# Patient Record
Sex: MALE | Race: WHITE | NOT HISPANIC OR LATINO
[De-identification: names, ages, dates, MRNs, and addresses within clinical notes are randomized per-mention and may not be internally consistent; named-entity substitution may affect disease eponyms.]

---

## 2017-01-09 ENCOUNTER — APPOINTMENT (OUTPATIENT)
Dept: CARDIOLOGY | Facility: CLINIC | Age: 57
End: 2017-01-09

## 2017-01-11 ENCOUNTER — APPOINTMENT (OUTPATIENT)
Dept: CARDIOLOGY | Facility: CLINIC | Age: 57
End: 2017-01-11

## 2017-01-17 ENCOUNTER — APPOINTMENT (OUTPATIENT)
Dept: CARDIOLOGY | Facility: CLINIC | Age: 57
End: 2017-01-17

## 2017-02-16 ENCOUNTER — OTHER (OUTPATIENT)
Age: 57
End: 2017-02-16

## 2017-02-27 ENCOUNTER — APPOINTMENT (OUTPATIENT)
Dept: CARDIOLOGY | Facility: CLINIC | Age: 57
End: 2017-02-27

## 2017-04-12 ENCOUNTER — APPOINTMENT (OUTPATIENT)
Dept: CARDIOLOGY | Facility: CLINIC | Age: 57
End: 2017-04-12

## 2017-04-12 VITALS
HEART RATE: 57 BPM | HEIGHT: 70 IN | SYSTOLIC BLOOD PRESSURE: 132 MMHG | WEIGHT: 275 LBS | DIASTOLIC BLOOD PRESSURE: 80 MMHG | BODY MASS INDEX: 39.37 KG/M2

## 2017-06-05 ENCOUNTER — INPATIENT (INPATIENT)
Facility: HOSPITAL | Age: 57
LOS: 0 days | Discharge: HOME | End: 2017-06-06
Attending: EMERGENCY MEDICINE | Admitting: FAMILY MEDICINE

## 2017-06-05 DIAGNOSIS — I25.10 ATHEROSCLEROTIC HEART DISEASE OF NATIVE CORONARY ARTERY WITHOUT ANGINA PECTORIS: ICD-10-CM

## 2017-06-05 DIAGNOSIS — E11.9 TYPE 2 DIABETES MELLITUS WITHOUT COMPLICATIONS: ICD-10-CM

## 2017-06-05 DIAGNOSIS — J45.909 UNSPECIFIED ASTHMA, UNCOMPLICATED: ICD-10-CM

## 2017-06-05 DIAGNOSIS — K20.9 ESOPHAGITIS, UNSPECIFIED: ICD-10-CM

## 2017-06-05 DIAGNOSIS — I16.0 HYPERTENSIVE URGENCY: ICD-10-CM

## 2017-06-05 DIAGNOSIS — E66.9 OBESITY, UNSPECIFIED: ICD-10-CM

## 2017-06-06 ENCOUNTER — APPOINTMENT (OUTPATIENT)
Dept: CARDIOLOGY | Facility: CLINIC | Age: 57
End: 2017-06-06

## 2017-06-27 ENCOUNTER — APPOINTMENT (OUTPATIENT)
Dept: CARDIOLOGY | Facility: CLINIC | Age: 57
End: 2017-06-27

## 2017-06-27 VITALS
WEIGHT: 274 LBS | BODY MASS INDEX: 39.22 KG/M2 | HEART RATE: 79 BPM | SYSTOLIC BLOOD PRESSURE: 152 MMHG | DIASTOLIC BLOOD PRESSURE: 90 MMHG | HEIGHT: 70 IN

## 2017-06-28 DIAGNOSIS — R11.0 NAUSEA: ICD-10-CM

## 2017-06-28 DIAGNOSIS — I10 ESSENTIAL (PRIMARY) HYPERTENSION: ICD-10-CM

## 2017-06-28 DIAGNOSIS — Z98.84 BARIATRIC SURGERY STATUS: ICD-10-CM

## 2017-06-28 DIAGNOSIS — M32.9 SYSTEMIC LUPUS ERYTHEMATOSUS, UNSPECIFIED: ICD-10-CM

## 2017-06-28 DIAGNOSIS — M35.00 SJOGREN SYNDROME, UNSPECIFIED: ICD-10-CM

## 2017-06-28 DIAGNOSIS — E11.9 TYPE 2 DIABETES MELLITUS WITHOUT COMPLICATIONS: ICD-10-CM

## 2017-06-28 DIAGNOSIS — I25.10 ATHEROSCLEROTIC HEART DISEASE OF NATIVE CORONARY ARTERY WITHOUT ANGINA PECTORIS: ICD-10-CM

## 2017-06-28 DIAGNOSIS — R10.11 RIGHT UPPER QUADRANT PAIN: ICD-10-CM

## 2017-06-28 DIAGNOSIS — E78.5 HYPERLIPIDEMIA, UNSPECIFIED: ICD-10-CM

## 2017-06-28 DIAGNOSIS — J44.9 CHRONIC OBSTRUCTIVE PULMONARY DISEASE, UNSPECIFIED: ICD-10-CM

## 2017-06-28 DIAGNOSIS — R07.89 OTHER CHEST PAIN: ICD-10-CM

## 2017-06-28 DIAGNOSIS — Z95.1 PRESENCE OF AORTOCORONARY BYPASS GRAFT: ICD-10-CM

## 2017-06-28 DIAGNOSIS — R07.9 CHEST PAIN, UNSPECIFIED: ICD-10-CM

## 2017-06-28 DIAGNOSIS — M06.9 RHEUMATOID ARTHRITIS, UNSPECIFIED: ICD-10-CM

## 2017-06-28 DIAGNOSIS — K22.70 BARRETT'S ESOPHAGUS WITHOUT DYSPLASIA: ICD-10-CM

## 2017-08-09 ENCOUNTER — OUTPATIENT (OUTPATIENT)
Dept: OUTPATIENT SERVICES | Facility: HOSPITAL | Age: 57
LOS: 1 days | Discharge: HOME | End: 2017-08-09

## 2017-08-09 DIAGNOSIS — I16.0 HYPERTENSIVE URGENCY: ICD-10-CM

## 2017-08-09 DIAGNOSIS — J45.909 UNSPECIFIED ASTHMA, UNCOMPLICATED: ICD-10-CM

## 2017-08-09 DIAGNOSIS — I25.10 ATHEROSCLEROTIC HEART DISEASE OF NATIVE CORONARY ARTERY WITHOUT ANGINA PECTORIS: ICD-10-CM

## 2017-08-09 DIAGNOSIS — E11.9 TYPE 2 DIABETES MELLITUS WITHOUT COMPLICATIONS: ICD-10-CM

## 2017-08-09 DIAGNOSIS — K20.9 ESOPHAGITIS, UNSPECIFIED: ICD-10-CM

## 2017-08-09 DIAGNOSIS — E66.9 OBESITY, UNSPECIFIED: ICD-10-CM

## 2017-08-14 DIAGNOSIS — K62.5 HEMORRHAGE OF ANUS AND RECTUM: ICD-10-CM

## 2017-08-14 DIAGNOSIS — I10 ESSENTIAL (PRIMARY) HYPERTENSION: ICD-10-CM

## 2017-08-14 DIAGNOSIS — D12.4 BENIGN NEOPLASM OF DESCENDING COLON: ICD-10-CM

## 2017-08-14 DIAGNOSIS — J44.9 CHRONIC OBSTRUCTIVE PULMONARY DISEASE, UNSPECIFIED: ICD-10-CM

## 2017-08-14 DIAGNOSIS — K57.30 DIVERTICULOSIS OF LARGE INTESTINE WITHOUT PERFORATION OR ABSCESS WITHOUT BLEEDING: ICD-10-CM

## 2017-08-14 DIAGNOSIS — G47.33 OBSTRUCTIVE SLEEP APNEA (ADULT) (PEDIATRIC): ICD-10-CM

## 2017-08-14 DIAGNOSIS — K64.8 OTHER HEMORRHOIDS: ICD-10-CM

## 2017-08-14 DIAGNOSIS — K52.9 NONINFECTIVE GASTROENTERITIS AND COLITIS, UNSPECIFIED: ICD-10-CM

## 2017-08-14 DIAGNOSIS — I25.10 ATHEROSCLEROTIC HEART DISEASE OF NATIVE CORONARY ARTERY WITHOUT ANGINA PECTORIS: ICD-10-CM

## 2017-08-14 DIAGNOSIS — E11.9 TYPE 2 DIABETES MELLITUS WITHOUT COMPLICATIONS: ICD-10-CM

## 2017-08-18 ENCOUNTER — MEDICATION RENEWAL (OUTPATIENT)
Age: 57
End: 2017-08-18

## 2017-08-24 ENCOUNTER — MEDICATION RENEWAL (OUTPATIENT)
Age: 57
End: 2017-08-24

## 2017-09-06 ENCOUNTER — APPOINTMENT (OUTPATIENT)
Dept: CARDIOLOGY | Facility: CLINIC | Age: 57
End: 2017-09-06

## 2017-12-06 ENCOUNTER — APPOINTMENT (OUTPATIENT)
Dept: CARDIOLOGY | Facility: CLINIC | Age: 57
End: 2017-12-06

## 2017-12-06 VITALS
HEIGHT: 70 IN | WEIGHT: 285 LBS | DIASTOLIC BLOOD PRESSURE: 106 MMHG | HEART RATE: 87 BPM | BODY MASS INDEX: 40.8 KG/M2 | SYSTOLIC BLOOD PRESSURE: 218 MMHG

## 2017-12-06 RX ORDER — ATORVASTATIN CALCIUM 40 MG/1
40 TABLET, FILM COATED ORAL DAILY
Qty: 90 | Refills: 3 | Status: ACTIVE | COMMUNITY

## 2017-12-06 RX ORDER — OXYCODONE AND ACETAMINOPHEN 5; 325 MG/1; MG/1
5-325 TABLET ORAL
Refills: 0 | Status: ACTIVE | COMMUNITY

## 2017-12-06 RX ORDER — PREDNISONE 20 MG/1
20 TABLET ORAL
Refills: 0 | Status: ACTIVE | COMMUNITY

## 2017-12-06 RX ORDER — RANITIDINE HYDROCHLORIDE 150 MG/1
150 CAPSULE ORAL
Refills: 0 | Status: ACTIVE | COMMUNITY
Start: 2017-03-31

## 2017-12-13 ENCOUNTER — APPOINTMENT (OUTPATIENT)
Dept: CARDIOLOGY | Facility: CLINIC | Age: 57
End: 2017-12-13

## 2017-12-13 VITALS
SYSTOLIC BLOOD PRESSURE: 178 MMHG | HEIGHT: 70 IN | BODY MASS INDEX: 40.8 KG/M2 | WEIGHT: 285 LBS | DIASTOLIC BLOOD PRESSURE: 100 MMHG

## 2017-12-13 RX ORDER — DIPHENHYDRAMINE HYDROCHLORIDE 25 MG/1
25 CAPSULE ORAL
Refills: 0 | Status: ACTIVE | COMMUNITY
Start: 2017-06-16

## 2018-01-01 ENCOUNTER — MEDICATION RENEWAL (OUTPATIENT)
Age: 58
End: 2018-01-01

## 2018-01-01 ENCOUNTER — RX RENEWAL (OUTPATIENT)
Age: 58
End: 2018-01-01

## 2018-01-03 ENCOUNTER — APPOINTMENT (OUTPATIENT)
Dept: CARDIOLOGY | Facility: CLINIC | Age: 58
End: 2018-01-03

## 2018-01-03 VITALS
WEIGHT: 285 LBS | HEIGHT: 70 IN | DIASTOLIC BLOOD PRESSURE: 100 MMHG | BODY MASS INDEX: 40.8 KG/M2 | SYSTOLIC BLOOD PRESSURE: 178 MMHG

## 2018-01-16 ENCOUNTER — OUTPATIENT (OUTPATIENT)
Dept: OUTPATIENT SERVICES | Facility: HOSPITAL | Age: 58
LOS: 1 days | Discharge: HOME | End: 2018-01-16

## 2018-01-16 DIAGNOSIS — I16.0 HYPERTENSIVE URGENCY: ICD-10-CM

## 2018-01-16 DIAGNOSIS — J45.909 UNSPECIFIED ASTHMA, UNCOMPLICATED: ICD-10-CM

## 2018-01-16 DIAGNOSIS — I25.10 ATHEROSCLEROTIC HEART DISEASE OF NATIVE CORONARY ARTERY WITHOUT ANGINA PECTORIS: ICD-10-CM

## 2018-01-16 DIAGNOSIS — E66.9 OBESITY, UNSPECIFIED: ICD-10-CM

## 2018-01-16 DIAGNOSIS — K20.9 ESOPHAGITIS, UNSPECIFIED: ICD-10-CM

## 2018-01-16 DIAGNOSIS — E11.9 TYPE 2 DIABETES MELLITUS WITHOUT COMPLICATIONS: ICD-10-CM

## 2018-05-09 ENCOUNTER — APPOINTMENT (OUTPATIENT)
Dept: CARDIOLOGY | Facility: CLINIC | Age: 58
End: 2018-05-09

## 2018-05-09 VITALS
SYSTOLIC BLOOD PRESSURE: 180 MMHG | HEIGHT: 70 IN | WEIGHT: 276 LBS | HEART RATE: 71 BPM | DIASTOLIC BLOOD PRESSURE: 80 MMHG | BODY MASS INDEX: 39.51 KG/M2

## 2018-05-15 ENCOUNTER — OUTPATIENT (OUTPATIENT)
Dept: OUTPATIENT SERVICES | Facility: HOSPITAL | Age: 58
LOS: 1 days | Discharge: HOME | End: 2018-05-15

## 2018-05-15 VITALS
TEMPERATURE: 97 F | HEART RATE: 68 BPM | DIASTOLIC BLOOD PRESSURE: 88 MMHG | SYSTOLIC BLOOD PRESSURE: 171 MMHG | RESPIRATION RATE: 18 BRPM | HEIGHT: 71 IN | OXYGEN SATURATION: 98 % | WEIGHT: 262.35 LBS

## 2018-05-15 DIAGNOSIS — I10 ESSENTIAL (PRIMARY) HYPERTENSION: ICD-10-CM

## 2018-05-15 DIAGNOSIS — G47.30 SLEEP APNEA, UNSPECIFIED: ICD-10-CM

## 2018-05-15 DIAGNOSIS — E11.9 TYPE 2 DIABETES MELLITUS WITHOUT COMPLICATIONS: ICD-10-CM

## 2018-05-15 DIAGNOSIS — M32.9 SYSTEMIC LUPUS ERYTHEMATOSUS, UNSPECIFIED: ICD-10-CM

## 2018-05-15 DIAGNOSIS — Z98.890 OTHER SPECIFIED POSTPROCEDURAL STATES: Chronic | ICD-10-CM

## 2018-05-15 DIAGNOSIS — Z01.818 ENCOUNTER FOR OTHER PREPROCEDURAL EXAMINATION: ICD-10-CM

## 2018-05-15 DIAGNOSIS — E66.9 OBESITY, UNSPECIFIED: ICD-10-CM

## 2018-05-15 DIAGNOSIS — I25.10 ATHEROSCLEROTIC HEART DISEASE OF NATIVE CORONARY ARTERY WITHOUT ANGINA PECTORIS: ICD-10-CM

## 2018-05-15 LAB
ANION GAP SERPL CALC-SCNC: 14 MMOL/L — SIGNIFICANT CHANGE UP (ref 7–14)
APTT BLD: 30.6 SEC — SIGNIFICANT CHANGE UP (ref 27–39.2)
BASOPHILS # BLD AUTO: 0.07 K/UL — SIGNIFICANT CHANGE UP (ref 0–0.2)
BASOPHILS NFR BLD AUTO: 0.9 % — SIGNIFICANT CHANGE UP (ref 0–1)
BUN SERPL-MCNC: 20 MG/DL — SIGNIFICANT CHANGE UP (ref 10–20)
CALCIUM SERPL-MCNC: 9.8 MG/DL — SIGNIFICANT CHANGE UP (ref 8.5–10.1)
CHLORIDE SERPL-SCNC: 97 MMOL/L — LOW (ref 98–110)
CO2 SERPL-SCNC: 28 MMOL/L — SIGNIFICANT CHANGE UP (ref 17–32)
CREAT SERPL-MCNC: 1.1 MG/DL — SIGNIFICANT CHANGE UP (ref 0.7–1.5)
EOSINOPHIL # BLD AUTO: 0.18 K/UL — SIGNIFICANT CHANGE UP (ref 0–0.7)
EOSINOPHIL NFR BLD AUTO: 2.3 % — SIGNIFICANT CHANGE UP (ref 0–8)
GLUCOSE SERPL-MCNC: 178 MG/DL — HIGH (ref 70–99)
HCT VFR BLD CALC: 48.1 % — SIGNIFICANT CHANGE UP (ref 42–52)
HGB BLD-MCNC: 15.8 G/DL — SIGNIFICANT CHANGE UP (ref 14–18)
IMM GRANULOCYTES NFR BLD AUTO: 0.5 % — HIGH (ref 0.1–0.3)
INR BLD: 0.94 RATIO — SIGNIFICANT CHANGE UP (ref 0.65–1.3)
LYMPHOCYTES # BLD AUTO: 1.37 K/UL — SIGNIFICANT CHANGE UP (ref 1.2–3.4)
LYMPHOCYTES # BLD AUTO: 17.3 % — LOW (ref 20.5–51.1)
MCHC RBC-ENTMCNC: 27.5 PG — SIGNIFICANT CHANGE UP (ref 27–31)
MCHC RBC-ENTMCNC: 32.8 G/DL — SIGNIFICANT CHANGE UP (ref 32–37)
MCV RBC AUTO: 83.8 FL — SIGNIFICANT CHANGE UP (ref 80–94)
MONOCYTES # BLD AUTO: 0.58 K/UL — SIGNIFICANT CHANGE UP (ref 0.1–0.6)
MONOCYTES NFR BLD AUTO: 7.3 % — SIGNIFICANT CHANGE UP (ref 1.7–9.3)
NEUTROPHILS # BLD AUTO: 5.68 K/UL — SIGNIFICANT CHANGE UP (ref 1.4–6.5)
NEUTROPHILS NFR BLD AUTO: 71.7 % — SIGNIFICANT CHANGE UP (ref 42.2–75.2)
NRBC # BLD: 0 /100 WBCS — SIGNIFICANT CHANGE UP (ref 0–0)
PLATELET # BLD AUTO: 238 K/UL — SIGNIFICANT CHANGE UP (ref 130–400)
POTASSIUM SERPL-MCNC: 3.6 MMOL/L — SIGNIFICANT CHANGE UP (ref 3.5–5)
POTASSIUM SERPL-SCNC: 3.6 MMOL/L — SIGNIFICANT CHANGE UP (ref 3.5–5)
PROTHROM AB SERPL-ACNC: 10.1 SEC — SIGNIFICANT CHANGE UP (ref 9.95–12.87)
RBC # BLD: 5.74 M/UL — SIGNIFICANT CHANGE UP (ref 4.7–6.1)
RBC # FLD: 12.9 % — SIGNIFICANT CHANGE UP (ref 11.5–14.5)
SODIUM SERPL-SCNC: 139 MMOL/L — SIGNIFICANT CHANGE UP (ref 135–146)
WBC # BLD: 7.92 K/UL — SIGNIFICANT CHANGE UP (ref 4.8–10.8)
WBC # FLD AUTO: 7.92 K/UL — SIGNIFICANT CHANGE UP (ref 4.8–10.8)

## 2018-05-15 RX ORDER — ASPIRIN/CALCIUM CARB/MAGNESIUM 324 MG
1 TABLET ORAL
Qty: 0 | Refills: 0 | COMMUNITY

## 2018-05-15 RX ORDER — NEBIVOLOL HYDROCHLORIDE 5 MG/1
1 TABLET ORAL
Qty: 0 | Refills: 0 | COMMUNITY

## 2018-05-15 RX ORDER — METOPROLOL TARTRATE 50 MG
1 TABLET ORAL
Qty: 0 | Refills: 0 | COMMUNITY

## 2018-05-15 NOTE — H&P PST ADULT - PMH
Arthritis  ra hands knees  Asthma  stable post 911  CAD (coronary artery disease) of bypass graft  2015 x 3 vd  COPD (chronic obstructive pulmonary disease)  post 911  Diabetes    Hiatal hernia with GERD    HTN (hypertension)    Murmur    Myocardial infarction  2015  Obesity    Obstructive sleep apnea on CPAP    Sjogrens syndrome    SLE (systemic lupus erythematosus)    Thyroid nodule
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
## 2018-05-15 NOTE — H&P PST ADULT - PRIMARY CARE PROVIDER
de gennearo  cardio malpeso rheum dr hoover  h/o ra no dis mod rx  x 1 yr last c spine xray 6 mos ago wnl pulm dr anne marie adairny

## 2018-05-15 NOTE — H&P PST ADULT - FAMILY HISTORY
Father  Still living? Unknown  CAD (coronary artery disease), Age at diagnosis: Age Unknown  Diabetes, Age at diagnosis: Age Unknown     Mother  Still living? Unknown  CAD (coronary artery disease), Age at diagnosis: Age Unknown  Diabetes, Age at diagnosis: Age Unknown

## 2018-05-22 ENCOUNTER — OUTPATIENT (OUTPATIENT)
Dept: OUTPATIENT SERVICES | Facility: HOSPITAL | Age: 58
LOS: 1 days | Discharge: HOME | End: 2018-05-22

## 2018-05-22 ENCOUNTER — INPATIENT (INPATIENT)
Facility: HOSPITAL | Age: 58
LOS: 0 days | Discharge: HOME | End: 2018-05-22
Attending: INTERNAL MEDICINE | Admitting: INTERNAL MEDICINE

## 2018-05-22 DIAGNOSIS — I10 ESSENTIAL (PRIMARY) HYPERTENSION: ICD-10-CM

## 2018-05-22 DIAGNOSIS — E66.9 OBESITY, UNSPECIFIED: ICD-10-CM

## 2018-05-22 DIAGNOSIS — E11.9 TYPE 2 DIABETES MELLITUS WITHOUT COMPLICATIONS: ICD-10-CM

## 2018-05-22 DIAGNOSIS — Z98.890 OTHER SPECIFIED POSTPROCEDURAL STATES: Chronic | ICD-10-CM

## 2018-05-22 DIAGNOSIS — G47.33 OBSTRUCTIVE SLEEP APNEA (ADULT) (PEDIATRIC): ICD-10-CM

## 2018-05-22 DIAGNOSIS — I25.10 ATHEROSCLEROTIC HEART DISEASE OF NATIVE CORONARY ARTERY WITHOUT ANGINA PECTORIS: ICD-10-CM

## 2018-05-22 DIAGNOSIS — J44.9 CHRONIC OBSTRUCTIVE PULMONARY DISEASE, UNSPECIFIED: ICD-10-CM

## 2018-05-22 NOTE — PROGRESS NOTE ADULT - SUBJECTIVE AND OBJECTIVE BOX
POST OPERATIVE PROCEDURAL DOCUMENTATION   Preliminary Cardiac Catherization Post-Procedure Report:    PRE-OP DIAGNOSIS: CAD, S/P CABG, Angina, Positive stress thallium exam    PROCEDURE:     Left Heart Catheterization     LV Angiogram     Selective Coronary Angiogram     Opacification of bypass grafts     Peripheral Angiogram    Primary Physician:  Aleksey Raymond M.D.  Cardiac Fellow:  Dr. Rosales    ANESTHESIA TYPE: Local    ESTIMATED BLOOD LOSS:  Less than 10 cc    CONDITION: Good    SPECIMENS REMOVED (IF APPLICABLE): Not Applicable    IMPLANTS (IF APPLICABLE): NA    DESCRIPTION OF PROCEDURRE:  The right and left femoral groins, as well as the right radial artery were prepped and draped in the usual sterile fashion. Following local instillation of lidocane, a 6Fr introducer was inserted percutaneously into the right femoral artery using the seldinger technique. Following this, multiple guidewires and pre shaped catheters were used to adequately opacifiy the coronary arteries and perform left heart catheterization as well as an LV angiogram, using the Judkin's approach. Left heart catheterization and left ventricular angiogram was performed in the GILL projection. Following this, selective coronary angiography was performed in multiple obliquities. At the end of the procedure, all guidewires and catheters were removed. Homostasis was obtained by closure device.  There were no complications.                                 FINDINGS OF PROCEDURE:  1. Left Heart Catheterization:            LVEDP: normal                                                       LV Pressure: 12-14                                                       CA Pressure: 180/12-14                  2. LV Angiogram:  The LV is normal in size.                                    Overall, there is mild LV systolic dysfunction.                                   Mild global hypokenesia noted.                                   The EF is approx. 50%                                   No mitral valve prolapse, mitral insufficiency or mitral annular calcification seen.   3. SELECTIVE CORONARY ANGIOGRAM:                           LEFT MAIN: Severe occlusion                           LAD: 100% mid occlusion                           LCX: 100% occluded                           RCA: Normal                           PDA: Normal                           PLV: 90% obstructed  4. Lima to the LAD was patent     Vein to OM off the LCX and PLV off the RCA was patent and free of disease.    5.  The coronary circulation was right dominant.    5.  PERIPHERAL ANGIOGRAM was performed, which revealed no significant aorta-illiac disease. A closure device using angioseal was done.        Post Procedure Diagnosis/Impression:CAD, Angina, Patent grafts, Mild LV systolic dysfunction, Positive thallium exam.                         Complications:  None    PLAN OF CARE:  D/C Home today                              Continue medical therapy                              Office visit in 2-3 weeks

## 2018-05-22 NOTE — PROGRESS NOTE ADULT - SUBJECTIVE AND OBJECTIVE BOX
PRE-OPERATIVE DAY OF PROCEDURE EVALUATION NOTE:  I have personally seen and examined the patient. I agree with the history and physicial which I have reviewed and noted any changes below. signed electronically by Dr Aleksey Rayomnd 05-22-18 @ 08:39

## 2018-05-27 DIAGNOSIS — I25.119 ATHEROSCLEROTIC HEART DISEASE OF NATIVE CORONARY ARTERY WITH UNSPECIFIED ANGINA PECTORIS: ICD-10-CM

## 2018-05-27 DIAGNOSIS — Z95.5 PRESENCE OF CORONARY ANGIOPLASTY IMPLANT AND GRAFT: ICD-10-CM

## 2018-06-14 ENCOUNTER — APPOINTMENT (OUTPATIENT)
Dept: CARDIOLOGY | Facility: CLINIC | Age: 58
End: 2018-06-14

## 2018-06-14 VITALS
SYSTOLIC BLOOD PRESSURE: 180 MMHG | HEIGHT: 70 IN | DIASTOLIC BLOOD PRESSURE: 64 MMHG | HEART RATE: 71 BPM | WEIGHT: 278 LBS | BODY MASS INDEX: 39.8 KG/M2

## 2018-06-14 RX ORDER — PANTOPRAZOLE 40 MG/1
40 TABLET, DELAYED RELEASE ORAL
Refills: 0 | Status: ACTIVE | COMMUNITY
Start: 2016-04-21

## 2018-06-14 RX ORDER — BUDESONIDE 180 UG/1
180 AEROSOL, POWDER RESPIRATORY (INHALATION) TWICE DAILY
Refills: 0 | Status: ACTIVE | COMMUNITY
Start: 2016-04-21

## 2018-06-14 RX ORDER — SITAGLIPTIN 100 MG/1
100 TABLET, FILM COATED ORAL
Qty: 10 | Refills: 1 | Status: DISCONTINUED | COMMUNITY
Start: 2018-06-14 | End: 2018-06-14

## 2018-06-22 ENCOUNTER — OTHER (OUTPATIENT)
Age: 58
End: 2018-06-22

## 2018-07-12 ENCOUNTER — RX RENEWAL (OUTPATIENT)
Age: 58
End: 2018-07-12

## 2018-07-27 ENCOUNTER — MEDICATION RENEWAL (OUTPATIENT)
Age: 58
End: 2018-07-27

## 2018-08-27 ENCOUNTER — MEDICATION RENEWAL (OUTPATIENT)
Age: 58
End: 2018-08-27

## 2018-08-29 ENCOUNTER — RX RENEWAL (OUTPATIENT)
Age: 58
End: 2018-08-29

## 2018-09-27 PROBLEM — K21.9 GASTRO-ESOPHAGEAL REFLUX DISEASE WITHOUT ESOPHAGITIS: Chronic | Status: ACTIVE | Noted: 2018-05-15

## 2018-09-27 PROBLEM — R01.1 CARDIAC MURMUR, UNSPECIFIED: Chronic | Status: ACTIVE | Noted: 2018-05-15

## 2018-09-27 PROBLEM — G47.33 OBSTRUCTIVE SLEEP APNEA (ADULT) (PEDIATRIC): Chronic | Status: ACTIVE | Noted: 2018-05-15

## 2018-09-27 PROBLEM — I25.810 ATHEROSCLEROSIS OF CORONARY ARTERY BYPASS GRAFT(S) WITHOUT ANGINA PECTORIS: Chronic | Status: ACTIVE | Noted: 2018-05-15

## 2018-09-27 PROBLEM — E11.9 TYPE 2 DIABETES MELLITUS WITHOUT COMPLICATIONS: Chronic | Status: ACTIVE | Noted: 2018-05-15

## 2018-09-27 PROBLEM — E04.1 NONTOXIC SINGLE THYROID NODULE: Chronic | Status: ACTIVE | Noted: 2018-05-15

## 2018-09-27 PROBLEM — I10 ESSENTIAL (PRIMARY) HYPERTENSION: Chronic | Status: ACTIVE | Noted: 2018-05-15

## 2018-09-27 PROBLEM — I21.9 ACUTE MYOCARDIAL INFARCTION, UNSPECIFIED: Chronic | Status: ACTIVE | Noted: 2018-05-15

## 2018-09-27 PROBLEM — M32.9 SYSTEMIC LUPUS ERYTHEMATOSUS, UNSPECIFIED: Chronic | Status: ACTIVE | Noted: 2018-05-15

## 2018-09-27 PROBLEM — J45.909 UNSPECIFIED ASTHMA, UNCOMPLICATED: Chronic | Status: ACTIVE | Noted: 2018-05-15

## 2018-09-27 PROBLEM — J44.9 CHRONIC OBSTRUCTIVE PULMONARY DISEASE, UNSPECIFIED: Chronic | Status: ACTIVE | Noted: 2018-05-15

## 2018-09-27 PROBLEM — M35.00 SJOGREN SYNDROME, UNSPECIFIED: Chronic | Status: ACTIVE | Noted: 2018-05-15

## 2018-09-27 PROBLEM — M19.90 UNSPECIFIED OSTEOARTHRITIS, UNSPECIFIED SITE: Chronic | Status: ACTIVE | Noted: 2018-05-15

## 2018-09-27 PROBLEM — E66.9 OBESITY, UNSPECIFIED: Chronic | Status: ACTIVE | Noted: 2018-05-15

## 2018-11-07 ENCOUNTER — MEDICATION RENEWAL (OUTPATIENT)
Age: 58
End: 2018-11-07

## 2018-11-08 ENCOUNTER — MEDICATION RENEWAL (OUTPATIENT)
Age: 58
End: 2018-11-08

## 2019-01-01 ENCOUNTER — APPOINTMENT (OUTPATIENT)
Dept: NEUROLOGY | Facility: CLINIC | Age: 59
End: 2019-01-01
Payer: MEDICARE

## 2019-01-01 ENCOUNTER — EMERGENCY (EMERGENCY)
Facility: HOSPITAL | Age: 59
LOS: 0 days | Discharge: HOME | End: 2019-12-17
Attending: EMERGENCY MEDICINE
Payer: MEDICARE

## 2019-01-01 ENCOUNTER — RX RENEWAL (OUTPATIENT)
Age: 59
End: 2019-01-01

## 2019-01-01 ENCOUNTER — APPOINTMENT (OUTPATIENT)
Dept: CARDIOLOGY | Facility: CLINIC | Age: 59
End: 2019-01-01

## 2019-01-01 ENCOUNTER — INPATIENT (INPATIENT)
Facility: HOSPITAL | Age: 59
LOS: 1 days | Discharge: HOME | End: 2019-09-23
Attending: HOSPITALIST | Admitting: HOSPITALIST
Payer: MEDICARE

## 2019-01-01 ENCOUNTER — TRANSCRIPTION ENCOUNTER (OUTPATIENT)
Age: 59
End: 2019-01-01

## 2019-01-01 ENCOUNTER — APPOINTMENT (OUTPATIENT)
Dept: CARDIOLOGY | Facility: CLINIC | Age: 59
End: 2019-01-01
Payer: MEDICARE

## 2019-01-01 ENCOUNTER — INPATIENT (INPATIENT)
Facility: HOSPITAL | Age: 59
LOS: 10 days | Discharge: HOME | End: 2019-09-23
Attending: INTERNAL MEDICINE | Admitting: INTERNAL MEDICINE
Payer: MEDICARE

## 2019-01-01 ENCOUNTER — APPOINTMENT (OUTPATIENT)
Dept: NEUROLOGY | Facility: CLINIC | Age: 59
End: 2019-01-01

## 2019-01-01 VITALS — HEIGHT: 70 IN | SYSTOLIC BLOOD PRESSURE: 164 MMHG | HEART RATE: 63 BPM | DIASTOLIC BLOOD PRESSURE: 90 MMHG

## 2019-01-01 VITALS
OXYGEN SATURATION: 97 % | SYSTOLIC BLOOD PRESSURE: 108 MMHG | RESPIRATION RATE: 18 BRPM | TEMPERATURE: 96 F | HEART RATE: 86 BPM | DIASTOLIC BLOOD PRESSURE: 57 MMHG

## 2019-01-01 VITALS
SYSTOLIC BLOOD PRESSURE: 177 MMHG | OXYGEN SATURATION: 97 % | HEART RATE: 82 BPM | TEMPERATURE: 98 F | DIASTOLIC BLOOD PRESSURE: 85 MMHG | RESPIRATION RATE: 18 BRPM

## 2019-01-01 VITALS
HEART RATE: 90 BPM | HEIGHT: 70 IN | DIASTOLIC BLOOD PRESSURE: 100 MMHG | OXYGEN SATURATION: 98 % | WEIGHT: 296 LBS | BODY MASS INDEX: 42.37 KG/M2 | SYSTOLIC BLOOD PRESSURE: 200 MMHG

## 2019-01-01 VITALS
HEART RATE: 65 BPM | OXYGEN SATURATION: 98 % | HEIGHT: 70 IN | DIASTOLIC BLOOD PRESSURE: 88 MMHG | BODY MASS INDEX: 42.37 KG/M2 | WEIGHT: 296 LBS | SYSTOLIC BLOOD PRESSURE: 170 MMHG

## 2019-01-01 VITALS
SYSTOLIC BLOOD PRESSURE: 120 MMHG | TEMPERATURE: 96 F | HEART RATE: 78 BPM | DIASTOLIC BLOOD PRESSURE: 60 MMHG | RESPIRATION RATE: 20 BRPM

## 2019-01-01 VITALS
WEIGHT: 296 LBS | SYSTOLIC BLOOD PRESSURE: 186 MMHG | HEART RATE: 88 BPM | BODY MASS INDEX: 42.37 KG/M2 | DIASTOLIC BLOOD PRESSURE: 82 MMHG | HEIGHT: 70 IN

## 2019-01-01 VITALS — OXYGEN SATURATION: 98 %

## 2019-01-01 VITALS — TEMPERATURE: 97 F | WEIGHT: 250 LBS

## 2019-01-01 DIAGNOSIS — M17.0 BILATERAL PRIMARY OSTEOARTHRITIS OF KNEE: ICD-10-CM

## 2019-01-01 DIAGNOSIS — I25.2 OLD MYOCARDIAL INFARCTION: ICD-10-CM

## 2019-01-01 DIAGNOSIS — R53.1 WEAKNESS: ICD-10-CM
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
## 2019-01-01 DIAGNOSIS — M06.042 RHEUMATOID ARTHRITIS WITHOUT RHEUMATOID FACTOR, LEFT HAND: ICD-10-CM

## 2019-01-01 DIAGNOSIS — Z95.1 PRESENCE OF AORTOCORONARY BYPASS GRAFT: ICD-10-CM

## 2019-01-01 DIAGNOSIS — Z98.890 OTHER SPECIFIED POSTPROCEDURAL STATES: Chronic | ICD-10-CM

## 2019-01-01 DIAGNOSIS — M54.30 SCIATICA, UNSPECIFIED SIDE: ICD-10-CM
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
## 2019-01-01 DIAGNOSIS — E66.01 MORBID (SEVERE) OBESITY DUE TO EXCESS CALORIES: ICD-10-CM

## 2019-01-01 DIAGNOSIS — I10 ESSENTIAL (PRIMARY) HYPERTENSION: ICD-10-CM

## 2019-01-01 DIAGNOSIS — Z86.711 PERSONAL HISTORY OF PULMONARY EMBOLISM: ICD-10-CM

## 2019-01-01 DIAGNOSIS — I87.2 VENOUS INSUFFICIENCY (CHRONIC) (PERIPHERAL): ICD-10-CM

## 2019-01-01 DIAGNOSIS — G47.30 SLEEP APNEA, UNSPECIFIED: ICD-10-CM
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
## 2019-01-01 DIAGNOSIS — R60.9 EDEMA, UNSPECIFIED: ICD-10-CM

## 2019-01-01 DIAGNOSIS — S09.90XA UNSPECIFIED INJURY OF HEAD, INITIAL ENCOUNTER: ICD-10-CM

## 2019-01-01 DIAGNOSIS — I26.09 OTHER PULMONARY EMBOLISM WITH ACUTE COR PULMONALE: ICD-10-CM

## 2019-01-01 DIAGNOSIS — M79.89 OTHER SPECIFIED SOFT TISSUE DISORDERS: ICD-10-CM

## 2019-01-01 DIAGNOSIS — E11.65 TYPE 2 DIABETES MELLITUS WITH HYPERGLYCEMIA: ICD-10-CM

## 2019-01-01 DIAGNOSIS — N28.1 CYST OF KIDNEY, ACQUIRED: ICD-10-CM

## 2019-01-01 DIAGNOSIS — E04.1 NONTOXIC SINGLE THYROID NODULE: ICD-10-CM

## 2019-01-01 DIAGNOSIS — M32.9 SYSTEMIC LUPUS ERYTHEMATOSUS, UNSPECIFIED: ICD-10-CM

## 2019-01-01 DIAGNOSIS — R91.1 SOLITARY PULMONARY NODULE: ICD-10-CM
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
## 2019-01-01 DIAGNOSIS — G47.33 OBSTRUCTIVE SLEEP APNEA (ADULT) (PEDIATRIC): ICD-10-CM

## 2019-01-01 DIAGNOSIS — E87.1 HYPO-OSMOLALITY AND HYPONATREMIA: ICD-10-CM

## 2019-01-01 DIAGNOSIS — I25.10 ATHEROSCLEROTIC HEART DISEASE OF NATIVE CORONARY ARTERY W/OUT ANGINA PECTORIS: ICD-10-CM

## 2019-01-01 DIAGNOSIS — I80.10 PHLEBITIS AND THROMBOPHLEBITIS OF UNSPECIFIED FEMORAL VEIN: ICD-10-CM

## 2019-01-01 DIAGNOSIS — J44.9 CHRONIC OBSTRUCTIVE PULMONARY DISEASE, UNSPECIFIED: ICD-10-CM

## 2019-01-01 DIAGNOSIS — Z91.018 ALLERGY TO OTHER FOODS: ICD-10-CM

## 2019-01-01 DIAGNOSIS — E11.22 TYPE 2 DIABETES MELLITUS WITH DIABETIC CHRONIC KIDNEY DISEASE: ICD-10-CM

## 2019-01-01 DIAGNOSIS — Z79.4 LONG TERM (CURRENT) USE OF INSULIN: ICD-10-CM

## 2019-01-01 DIAGNOSIS — G62.9 POLYNEUROPATHY, UNSPECIFIED: ICD-10-CM

## 2019-01-01 DIAGNOSIS — R01.1 CARDIAC MURMUR, UNSPECIFIED: ICD-10-CM

## 2019-01-01 DIAGNOSIS — E87.6 HYPOKALEMIA: ICD-10-CM

## 2019-01-01 DIAGNOSIS — Z79.01 LONG TERM (CURRENT) USE OF ANTICOAGULANTS: ICD-10-CM

## 2019-01-01 DIAGNOSIS — N18.2 CHRONIC KIDNEY DISEASE, STAGE 2 (MILD): ICD-10-CM

## 2019-01-01 DIAGNOSIS — F41.9 ANXIETY DISORDER, UNSPECIFIED: ICD-10-CM

## 2019-01-01 DIAGNOSIS — I26.99 OTHER PULMONARY EMBOLISM WITHOUT ACUTE COR PULMONALE: ICD-10-CM

## 2019-01-01 DIAGNOSIS — K44.9 DIAPHRAGMATIC HERNIA WITHOUT OBSTRUCTION OR GANGRENE: ICD-10-CM

## 2019-01-01 DIAGNOSIS — Z74.09 OTHER REDUCED MOBILITY: ICD-10-CM

## 2019-01-01 DIAGNOSIS — I46.9 CARDIAC ARREST, CAUSE UNSPECIFIED: ICD-10-CM

## 2019-01-01 DIAGNOSIS — K21.9 GASTRO-ESOPHAGEAL REFLUX DISEASE WITHOUT ESOPHAGITIS: ICD-10-CM

## 2019-01-01 DIAGNOSIS — I25.10 ATHEROSCLEROTIC HEART DISEASE OF NATIVE CORONARY ARTERY WITHOUT ANGINA PECTORIS: ICD-10-CM

## 2019-01-01 DIAGNOSIS — E86.0 DEHYDRATION: ICD-10-CM

## 2019-01-01 DIAGNOSIS — E11.649 TYPE 2 DIABETES MELLITUS WITH HYPOGLYCEMIA WITHOUT COMA: ICD-10-CM

## 2019-01-01 DIAGNOSIS — I82.432 ACUTE EMBOLISM AND THROMBOSIS OF LEFT POPLITEAL VEIN: ICD-10-CM

## 2019-01-01 DIAGNOSIS — I46.8 CARDIAC ARREST DUE TO OTHER UNDERLYING CONDITION: ICD-10-CM

## 2019-01-01 DIAGNOSIS — G92 TOXIC ENCEPHALOPATHY: ICD-10-CM

## 2019-01-01 DIAGNOSIS — I12.9 HYPERTENSIVE CHRONIC KIDNEY DISEASE WITH STAGE 1 THROUGH STAGE 4 CHRONIC KIDNEY DISEASE, OR UNSPECIFIED CHRONIC KIDNEY DISEASE: ICD-10-CM

## 2019-01-01 DIAGNOSIS — I80.229 PHLEBITIS AND THROMBOPHLEBITIS OF UNSPECIFIED POPLITEAL VEIN: ICD-10-CM

## 2019-01-01 DIAGNOSIS — M35.00 SJOGREN SYNDROME, UNSPECIFIED: ICD-10-CM

## 2019-01-01 DIAGNOSIS — E78.5 HYPERLIPIDEMIA, UNSPECIFIED: ICD-10-CM

## 2019-01-01 DIAGNOSIS — Z86.718 PERSONAL HISTORY OF OTHER VENOUS THROMBOSIS AND EMBOLISM: ICD-10-CM

## 2019-01-01 DIAGNOSIS — M06.041 RHEUMATOID ARTHRITIS WITHOUT RHEUMATOID FACTOR, RIGHT HAND: ICD-10-CM

## 2019-01-01 DIAGNOSIS — K76.0 FATTY (CHANGE OF) LIVER, NOT ELSEWHERE CLASSIFIED: ICD-10-CM

## 2019-01-01 LAB
ALBUMIN SERPL ELPH-MCNC: 3 G/DL — LOW (ref 3.5–5.2)
ALBUMIN SERPL ELPH-MCNC: 3.1 G/DL — LOW (ref 3.5–5.2)
ALBUMIN SERPL ELPH-MCNC: 3.2 G/DL — LOW (ref 3.5–5.2)
ALBUMIN SERPL ELPH-MCNC: 3.3 G/DL — LOW (ref 3.5–5.2)
ALBUMIN SERPL ELPH-MCNC: 3.3 G/DL — LOW (ref 3.5–5.2)
ALBUMIN SERPL ELPH-MCNC: 3.8 G/DL — SIGNIFICANT CHANGE UP (ref 3.5–5.2)
ALP SERPL-CCNC: 45 U/L — SIGNIFICANT CHANGE UP (ref 30–115)
ALP SERPL-CCNC: 48 U/L — SIGNIFICANT CHANGE UP (ref 30–115)
ALP SERPL-CCNC: 49 U/L — SIGNIFICANT CHANGE UP (ref 30–115)
ALP SERPL-CCNC: 50 U/L — SIGNIFICANT CHANGE UP (ref 30–115)
ALP SERPL-CCNC: 53 U/L — SIGNIFICANT CHANGE UP (ref 30–115)
ALP SERPL-CCNC: 53 U/L — SIGNIFICANT CHANGE UP (ref 30–115)
ALP SERPL-CCNC: 55 U/L — SIGNIFICANT CHANGE UP (ref 30–115)
ALP SERPL-CCNC: 55 U/L — SIGNIFICANT CHANGE UP (ref 30–115)
ALP SERPL-CCNC: 60 U/L — SIGNIFICANT CHANGE UP (ref 30–115)
ALP SERPL-CCNC: 67 U/L — SIGNIFICANT CHANGE UP (ref 30–115)
ALT FLD-CCNC: 24 U/L — SIGNIFICANT CHANGE UP (ref 0–41)
ALT FLD-CCNC: 25 U/L — SIGNIFICANT CHANGE UP (ref 0–41)
ALT FLD-CCNC: 27 U/L — SIGNIFICANT CHANGE UP (ref 0–41)
ALT FLD-CCNC: 29 U/L — SIGNIFICANT CHANGE UP (ref 0–41)
ALT FLD-CCNC: 31 U/L — SIGNIFICANT CHANGE UP (ref 0–41)
ALT FLD-CCNC: 33 U/L — SIGNIFICANT CHANGE UP (ref 0–41)
ALT FLD-CCNC: 33 U/L — SIGNIFICANT CHANGE UP (ref 0–41)
ALT FLD-CCNC: 35 U/L — SIGNIFICANT CHANGE UP (ref 0–41)
ALT FLD-CCNC: 43 U/L — HIGH (ref 0–41)
ALT FLD-CCNC: 76 U/L — HIGH (ref 0–41)
ANION GAP SERPL CALC-SCNC: 11 MMOL/L — SIGNIFICANT CHANGE UP (ref 7–14)
ANION GAP SERPL CALC-SCNC: 12 MMOL/L — SIGNIFICANT CHANGE UP (ref 7–14)
ANION GAP SERPL CALC-SCNC: 13 MMOL/L — SIGNIFICANT CHANGE UP (ref 7–14)
ANION GAP SERPL CALC-SCNC: 15 MMOL/L — HIGH (ref 7–14)
ANION GAP SERPL CALC-SCNC: 15 MMOL/L — HIGH (ref 7–14)
ANION GAP SERPL CALC-SCNC: 18 MMOL/L — HIGH (ref 7–14)
ANION GAP SERPL CALC-SCNC: 18 MMOL/L — HIGH (ref 7–14)
ANISOCYTOSIS BLD QL: SLIGHT — SIGNIFICANT CHANGE UP
APPEARANCE UR: CLEAR — SIGNIFICANT CHANGE UP
APTT BLD: 30.8 SEC — SIGNIFICANT CHANGE UP (ref 27–39.2)
APTT BLD: 30.8 SEC — SIGNIFICANT CHANGE UP (ref 27–39.2)
APTT BLD: 35.4 SEC — SIGNIFICANT CHANGE UP (ref 27–39.2)
APTT BLD: 35.8 SEC — SIGNIFICANT CHANGE UP (ref 27–39.2)
APTT BLD: 75.5 SEC — CRITICAL HIGH (ref 27–39.2)
AST SERPL-CCNC: 15 U/L — SIGNIFICANT CHANGE UP (ref 0–41)
AST SERPL-CCNC: 15 U/L — SIGNIFICANT CHANGE UP (ref 0–41)
AST SERPL-CCNC: 17 U/L — SIGNIFICANT CHANGE UP (ref 0–41)
AST SERPL-CCNC: 18 U/L — SIGNIFICANT CHANGE UP (ref 0–41)
AST SERPL-CCNC: 19 U/L — SIGNIFICANT CHANGE UP (ref 0–41)
AST SERPL-CCNC: 21 U/L — SIGNIFICANT CHANGE UP (ref 0–41)
AST SERPL-CCNC: 23 U/L — SIGNIFICANT CHANGE UP (ref 0–41)
AST SERPL-CCNC: 73 U/L — HIGH (ref 0–41)
BASE EXCESS BLDA CALC-SCNC: 5.5 MMOL/L — HIGH (ref -2–2)
BASE EXCESS BLDCOV CALC-SCNC: 4.5 MMOL/L — HIGH (ref -5.3–0.5)
BASE EXCESS BLDV CALC-SCNC: 6.7 MMOL/L — HIGH (ref -2–2)
BASOPHILS # BLD AUTO: 0 K/UL — SIGNIFICANT CHANGE UP (ref 0–0.2)
BASOPHILS # BLD AUTO: 0.02 K/UL — SIGNIFICANT CHANGE UP (ref 0–0.2)
BASOPHILS # BLD AUTO: 0.03 K/UL — SIGNIFICANT CHANGE UP (ref 0–0.2)
BASOPHILS # BLD AUTO: 0.05 K/UL — SIGNIFICANT CHANGE UP (ref 0–0.2)
BASOPHILS # BLD AUTO: 0.07 K/UL — SIGNIFICANT CHANGE UP (ref 0–0.2)
BASOPHILS # BLD AUTO: 0.08 K/UL — SIGNIFICANT CHANGE UP (ref 0–0.2)
BASOPHILS # BLD AUTO: 0.09 K/UL — SIGNIFICANT CHANGE UP (ref 0–0.2)
BASOPHILS # BLD AUTO: 0.11 K/UL — SIGNIFICANT CHANGE UP (ref 0–0.2)
BASOPHILS NFR BLD AUTO: 0 % — SIGNIFICANT CHANGE UP (ref 0–1)
BASOPHILS NFR BLD AUTO: 0.2 % — SIGNIFICANT CHANGE UP (ref 0–1)
BASOPHILS NFR BLD AUTO: 0.3 % — SIGNIFICANT CHANGE UP (ref 0–1)
BASOPHILS NFR BLD AUTO: 0.6 % — SIGNIFICANT CHANGE UP (ref 0–1)
BASOPHILS NFR BLD AUTO: 0.7 % — SIGNIFICANT CHANGE UP (ref 0–1)
BASOPHILS NFR BLD AUTO: 0.9 % — SIGNIFICANT CHANGE UP (ref 0–1)
BILIRUB SERPL-MCNC: 0.7 MG/DL — SIGNIFICANT CHANGE UP (ref 0.2–1.2)
BILIRUB SERPL-MCNC: 0.9 MG/DL — SIGNIFICANT CHANGE UP (ref 0.2–1.2)
BILIRUB SERPL-MCNC: 1.1 MG/DL — SIGNIFICANT CHANGE UP (ref 0.2–1.2)
BILIRUB SERPL-MCNC: 1.2 MG/DL — SIGNIFICANT CHANGE UP (ref 0.2–1.2)
BILIRUB SERPL-MCNC: 1.3 MG/DL — HIGH (ref 0.2–1.2)
BILIRUB SERPL-MCNC: 1.3 MG/DL — HIGH (ref 0.2–1.2)
BILIRUB SERPL-MCNC: 1.6 MG/DL — HIGH (ref 0.2–1.2)
BILIRUB UR-MCNC: NEGATIVE — SIGNIFICANT CHANGE UP
BUN SERPL-MCNC: 23 MG/DL — HIGH (ref 10–20)
BUN SERPL-MCNC: 23 MG/DL — HIGH (ref 10–20)
BUN SERPL-MCNC: 24 MG/DL — HIGH (ref 10–20)
BUN SERPL-MCNC: 25 MG/DL — HIGH (ref 10–20)
BUN SERPL-MCNC: 25 MG/DL — HIGH (ref 10–20)
BUN SERPL-MCNC: 28 MG/DL — HIGH (ref 10–20)
BUN SERPL-MCNC: 28 MG/DL — HIGH (ref 10–20)
BUN SERPL-MCNC: 29 MG/DL — HIGH (ref 10–20)
BUN SERPL-MCNC: 30 MG/DL — HIGH (ref 10–20)
BUN SERPL-MCNC: 31 MG/DL — HIGH (ref 10–20)
BUN SERPL-MCNC: 33 MG/DL — HIGH (ref 10–20)
BUN SERPL-MCNC: 34 MG/DL — HIGH (ref 10–20)
CA-I SERPL-SCNC: 1.1 MMOL/L — LOW (ref 1.12–1.3)
CALCIUM SERPL-MCNC: 8.4 MG/DL — LOW (ref 8.5–10.1)
CALCIUM SERPL-MCNC: 8.6 MG/DL — SIGNIFICANT CHANGE UP (ref 8.5–10.1)
CALCIUM SERPL-MCNC: 8.6 MG/DL — SIGNIFICANT CHANGE UP (ref 8.5–10.1)
CALCIUM SERPL-MCNC: 8.7 MG/DL — SIGNIFICANT CHANGE UP (ref 8.5–10.1)
CALCIUM SERPL-MCNC: 8.7 MG/DL — SIGNIFICANT CHANGE UP (ref 8.5–10.1)
CALCIUM SERPL-MCNC: 8.8 MG/DL — SIGNIFICANT CHANGE UP (ref 8.5–10.1)
CALCIUM SERPL-MCNC: 8.9 MG/DL — SIGNIFICANT CHANGE UP (ref 8.5–10.1)
CALCIUM SERPL-MCNC: 9 MG/DL — SIGNIFICANT CHANGE UP (ref 8.5–10.1)
CALCIUM SERPL-MCNC: 9.1 MG/DL — SIGNIFICANT CHANGE UP (ref 8.5–10.1)
CHLORIDE SERPL-SCNC: 100 MMOL/L — SIGNIFICANT CHANGE UP (ref 98–110)
CHLORIDE SERPL-SCNC: 87 MMOL/L — LOW (ref 98–110)
CHLORIDE SERPL-SCNC: 92 MMOL/L — LOW (ref 98–110)
CHLORIDE SERPL-SCNC: 94 MMOL/L — LOW (ref 98–110)
CHLORIDE SERPL-SCNC: 96 MMOL/L — LOW (ref 98–110)
CHLORIDE SERPL-SCNC: 96 MMOL/L — LOW (ref 98–110)
CHLORIDE SERPL-SCNC: 97 MMOL/L — LOW (ref 98–110)
CHLORIDE SERPL-SCNC: 98 MMOL/L — SIGNIFICANT CHANGE UP (ref 98–110)
CHLORIDE SERPL-SCNC: 98 MMOL/L — SIGNIFICANT CHANGE UP (ref 98–110)
CHLORIDE SERPL-SCNC: 99 MMOL/L — SIGNIFICANT CHANGE UP (ref 98–110)
CK MB CFR SERPL CALC: 1.2 NG/ML — SIGNIFICANT CHANGE UP (ref 0.6–6.3)
CO2 SERPL-SCNC: 24 MMOL/L — SIGNIFICANT CHANGE UP (ref 17–32)
CO2 SERPL-SCNC: 25 MMOL/L — SIGNIFICANT CHANGE UP (ref 17–32)
CO2 SERPL-SCNC: 25 MMOL/L — SIGNIFICANT CHANGE UP (ref 17–32)
CO2 SERPL-SCNC: 26 MMOL/L — SIGNIFICANT CHANGE UP (ref 17–32)
CO2 SERPL-SCNC: 27 MMOL/L — SIGNIFICANT CHANGE UP (ref 17–32)
CO2 SERPL-SCNC: 28 MMOL/L — SIGNIFICANT CHANGE UP (ref 17–32)
COLOR SPEC: YELLOW — SIGNIFICANT CHANGE UP
CORTIS AM PEAK SERPL-MCNC: 5.1 UG/DL — LOW (ref 6–18.4)
CREAT SERPL-MCNC: 0.9 MG/DL — SIGNIFICANT CHANGE UP (ref 0.7–1.5)
CREAT SERPL-MCNC: 1 MG/DL — SIGNIFICANT CHANGE UP (ref 0.7–1.5)
CREAT SERPL-MCNC: 1.1 MG/DL — SIGNIFICANT CHANGE UP (ref 0.7–1.5)
CREAT SERPL-MCNC: 1.2 MG/DL — SIGNIFICANT CHANGE UP (ref 0.7–1.5)
CREAT SERPL-MCNC: 1.2 MG/DL — SIGNIFICANT CHANGE UP (ref 0.7–1.5)
CULTURE RESULTS: SIGNIFICANT CHANGE UP
DIFF PNL FLD: NEGATIVE — SIGNIFICANT CHANGE UP
EOSINOPHIL # BLD AUTO: 0 K/UL — SIGNIFICANT CHANGE UP (ref 0–0.7)
EOSINOPHIL # BLD AUTO: 0.01 K/UL — SIGNIFICANT CHANGE UP (ref 0–0.7)
EOSINOPHIL # BLD AUTO: 0.01 K/UL — SIGNIFICANT CHANGE UP (ref 0–0.7)
EOSINOPHIL # BLD AUTO: 0.02 K/UL — SIGNIFICANT CHANGE UP (ref 0–0.7)
EOSINOPHIL # BLD AUTO: 0.03 K/UL — SIGNIFICANT CHANGE UP (ref 0–0.7)
EOSINOPHIL # BLD AUTO: 0.04 K/UL — SIGNIFICANT CHANGE UP (ref 0–0.7)
EOSINOPHIL # BLD AUTO: 0.05 K/UL — SIGNIFICANT CHANGE UP (ref 0–0.7)
EOSINOPHIL # BLD AUTO: 0.1 K/UL — SIGNIFICANT CHANGE UP (ref 0–0.7)
EOSINOPHIL NFR BLD AUTO: 0 % — SIGNIFICANT CHANGE UP (ref 0–8)
EOSINOPHIL NFR BLD AUTO: 0.1 % — SIGNIFICANT CHANGE UP (ref 0–8)
EOSINOPHIL NFR BLD AUTO: 0.1 % — SIGNIFICANT CHANGE UP (ref 0–8)
EOSINOPHIL NFR BLD AUTO: 0.2 % — SIGNIFICANT CHANGE UP (ref 0–8)
EOSINOPHIL NFR BLD AUTO: 0.3 % — SIGNIFICANT CHANGE UP (ref 0–8)
EOSINOPHIL NFR BLD AUTO: 0.3 % — SIGNIFICANT CHANGE UP (ref 0–8)
EOSINOPHIL NFR BLD AUTO: 0.4 % — SIGNIFICANT CHANGE UP (ref 0–8)
EOSINOPHIL NFR BLD AUTO: 0.9 % — SIGNIFICANT CHANGE UP (ref 0–8)
ESTIMATED AVERAGE GLUCOSE: 312 MG/DL — HIGH (ref 68–114)
GAS PNL BLDCOV: 7.44 — HIGH (ref 7.26–7.38)
GAS PNL BLDV: 132 MMOL/L — LOW (ref 136–145)
GAS PNL BLDV: SIGNIFICANT CHANGE UP
GIANT PLATELETS BLD QL SMEAR: PRESENT — SIGNIFICANT CHANGE UP
GLUCOSE BLDC GLUCOMTR-MCNC: 110 MG/DL — HIGH (ref 70–99)
GLUCOSE BLDC GLUCOMTR-MCNC: 118 MG/DL — HIGH (ref 70–99)
GLUCOSE BLDC GLUCOMTR-MCNC: 119 MG/DL — HIGH (ref 70–99)
GLUCOSE BLDC GLUCOMTR-MCNC: 126 MG/DL — HIGH (ref 70–99)
GLUCOSE BLDC GLUCOMTR-MCNC: 130 MG/DL — HIGH (ref 70–99)
GLUCOSE BLDC GLUCOMTR-MCNC: 131 MG/DL — HIGH (ref 70–99)
GLUCOSE BLDC GLUCOMTR-MCNC: 133 MG/DL — HIGH (ref 70–99)
GLUCOSE BLDC GLUCOMTR-MCNC: 133 MG/DL — HIGH (ref 70–99)
GLUCOSE BLDC GLUCOMTR-MCNC: 135 MG/DL — HIGH (ref 70–99)
GLUCOSE BLDC GLUCOMTR-MCNC: 136 MG/DL — HIGH (ref 70–99)
GLUCOSE BLDC GLUCOMTR-MCNC: 137 MG/DL — HIGH (ref 70–99)
GLUCOSE BLDC GLUCOMTR-MCNC: 142 MG/DL — HIGH (ref 70–99)
GLUCOSE BLDC GLUCOMTR-MCNC: 143 MG/DL — HIGH (ref 70–99)
GLUCOSE BLDC GLUCOMTR-MCNC: 146 MG/DL — HIGH (ref 70–99)
GLUCOSE BLDC GLUCOMTR-MCNC: 154 MG/DL — HIGH (ref 70–99)
GLUCOSE BLDC GLUCOMTR-MCNC: 158 MG/DL — HIGH (ref 70–99)
GLUCOSE BLDC GLUCOMTR-MCNC: 161 MG/DL — HIGH (ref 70–99)
GLUCOSE BLDC GLUCOMTR-MCNC: 169 MG/DL — HIGH (ref 70–99)
GLUCOSE BLDC GLUCOMTR-MCNC: 170 MG/DL — HIGH (ref 70–99)
GLUCOSE BLDC GLUCOMTR-MCNC: 171 MG/DL — HIGH (ref 70–99)
GLUCOSE BLDC GLUCOMTR-MCNC: 176 MG/DL — HIGH (ref 70–99)
GLUCOSE BLDC GLUCOMTR-MCNC: 181 MG/DL — HIGH (ref 70–99)
GLUCOSE BLDC GLUCOMTR-MCNC: 185 MG/DL — HIGH (ref 70–99)
GLUCOSE BLDC GLUCOMTR-MCNC: 194 MG/DL — HIGH (ref 70–99)
GLUCOSE BLDC GLUCOMTR-MCNC: 194 MG/DL — HIGH (ref 70–99)
GLUCOSE BLDC GLUCOMTR-MCNC: 197 MG/DL — HIGH (ref 70–99)
GLUCOSE BLDC GLUCOMTR-MCNC: 209 MG/DL — HIGH (ref 70–99)
GLUCOSE BLDC GLUCOMTR-MCNC: 212 MG/DL — HIGH (ref 70–99)
GLUCOSE BLDC GLUCOMTR-MCNC: 213 MG/DL — HIGH (ref 70–99)
GLUCOSE BLDC GLUCOMTR-MCNC: 214 MG/DL — HIGH (ref 70–99)
GLUCOSE BLDC GLUCOMTR-MCNC: 223 MG/DL — HIGH (ref 70–99)
GLUCOSE BLDC GLUCOMTR-MCNC: 240 MG/DL — HIGH (ref 70–99)
GLUCOSE BLDC GLUCOMTR-MCNC: 243 MG/DL — HIGH (ref 70–99)
GLUCOSE BLDC GLUCOMTR-MCNC: 244 MG/DL — HIGH (ref 70–99)
GLUCOSE BLDC GLUCOMTR-MCNC: 249 MG/DL — HIGH (ref 70–99)
GLUCOSE BLDC GLUCOMTR-MCNC: 259 MG/DL — HIGH (ref 70–99)
GLUCOSE BLDC GLUCOMTR-MCNC: 261 MG/DL — HIGH (ref 70–99)
GLUCOSE BLDC GLUCOMTR-MCNC: 274 MG/DL — HIGH (ref 70–99)
GLUCOSE BLDC GLUCOMTR-MCNC: 280 MG/DL — HIGH (ref 70–99)
GLUCOSE BLDC GLUCOMTR-MCNC: 283 MG/DL — HIGH (ref 70–99)
GLUCOSE BLDC GLUCOMTR-MCNC: 300 MG/DL — HIGH (ref 70–99)
GLUCOSE BLDC GLUCOMTR-MCNC: 310 MG/DL — HIGH (ref 70–99)
GLUCOSE BLDC GLUCOMTR-MCNC: 390 MG/DL — HIGH (ref 70–99)
GLUCOSE BLDC GLUCOMTR-MCNC: 397 MG/DL — HIGH (ref 70–99)
GLUCOSE BLDC GLUCOMTR-MCNC: 63 MG/DL — LOW (ref 70–99)
GLUCOSE BLDC GLUCOMTR-MCNC: 69 MG/DL — LOW (ref 70–99)
GLUCOSE BLDC GLUCOMTR-MCNC: 76 MG/DL — SIGNIFICANT CHANGE UP (ref 70–99)
GLUCOSE BLDC GLUCOMTR-MCNC: 87 MG/DL — SIGNIFICANT CHANGE UP (ref 70–99)
GLUCOSE BLDC GLUCOMTR-MCNC: 90 MG/DL — SIGNIFICANT CHANGE UP (ref 70–99)
GLUCOSE BLDC GLUCOMTR-MCNC: 92 MG/DL — SIGNIFICANT CHANGE UP (ref 70–99)
GLUCOSE BLDC GLUCOMTR-MCNC: 94 MG/DL — SIGNIFICANT CHANGE UP (ref 70–99)
GLUCOSE BLDC GLUCOMTR-MCNC: 95 MG/DL — SIGNIFICANT CHANGE UP (ref 70–99)
GLUCOSE BLDC GLUCOMTR-MCNC: 96 MG/DL — SIGNIFICANT CHANGE UP (ref 70–99)
GLUCOSE BLDC GLUCOMTR-MCNC: 99 MG/DL — SIGNIFICANT CHANGE UP (ref 70–99)
GLUCOSE SERPL-MCNC: 105 MG/DL — HIGH (ref 70–99)
GLUCOSE SERPL-MCNC: 106 MG/DL — HIGH (ref 70–99)
GLUCOSE SERPL-MCNC: 111 MG/DL — HIGH (ref 70–99)
GLUCOSE SERPL-MCNC: 152 MG/DL — HIGH (ref 70–99)
GLUCOSE SERPL-MCNC: 154 MG/DL — HIGH (ref 70–99)
GLUCOSE SERPL-MCNC: 200 MG/DL — HIGH (ref 70–99)
GLUCOSE SERPL-MCNC: 203 MG/DL — HIGH (ref 70–99)
GLUCOSE SERPL-MCNC: 221 MG/DL — HIGH (ref 70–99)
GLUCOSE SERPL-MCNC: 391 MG/DL — HIGH (ref 70–99)
GLUCOSE SERPL-MCNC: 81 MG/DL — SIGNIFICANT CHANGE UP (ref 70–99)
GLUCOSE SERPL-MCNC: 83 MG/DL — SIGNIFICANT CHANGE UP (ref 70–99)
GLUCOSE SERPL-MCNC: 84 MG/DL — SIGNIFICANT CHANGE UP (ref 70–99)
GLUCOSE UR QL: NEGATIVE — SIGNIFICANT CHANGE UP
HBA1C BLD-MCNC: 12.5 % — HIGH (ref 4–5.6)
HCO3 BLDA-SCNC: 27 MMOL/L — SIGNIFICANT CHANGE UP (ref 23–27)
HCO3 BLDCOV-SCNC: 29.6 MMOL/L — HIGH (ref 20.5–24.7)
HCO3 BLDV-SCNC: 31 MMOL/L — HIGH (ref 22–29)
HCT VFR BLD CALC: 40.3 % — LOW (ref 42–52)
HCT VFR BLD CALC: 41.5 % — LOW (ref 42–52)
HCT VFR BLD CALC: 42.3 % — SIGNIFICANT CHANGE UP (ref 42–52)
HCT VFR BLD CALC: 42.8 % — SIGNIFICANT CHANGE UP (ref 42–52)
HCT VFR BLD CALC: 42.9 % — SIGNIFICANT CHANGE UP (ref 42–52)
HCT VFR BLD CALC: 42.9 % — SIGNIFICANT CHANGE UP (ref 42–52)
HCT VFR BLD CALC: 43.1 % — SIGNIFICANT CHANGE UP (ref 42–52)
HCT VFR BLD CALC: 43.3 % — SIGNIFICANT CHANGE UP (ref 42–52)
HCT VFR BLD CALC: 43.5 % — SIGNIFICANT CHANGE UP (ref 42–52)
HCT VFR BLD CALC: 44.2 % — SIGNIFICANT CHANGE UP (ref 42–52)
HCT VFR BLD CALC: 45.1 % — SIGNIFICANT CHANGE UP (ref 42–52)
HCT VFR BLD CALC: 45.8 % — SIGNIFICANT CHANGE UP (ref 42–52)
HCT VFR BLD CALC: 47.3 % — SIGNIFICANT CHANGE UP (ref 42–52)
HCT VFR BLDA CALC: 55 % — HIGH (ref 34–44)
HCV AB S/CO SERPL IA: 0.16 S/CO — SIGNIFICANT CHANGE UP (ref 0–0.99)
HCV AB SERPL-IMP: SIGNIFICANT CHANGE UP
HGB BLD CALC-MCNC: 18 G/DL — SIGNIFICANT CHANGE UP (ref 14–18)
HGB BLD-MCNC: 13 G/DL — LOW (ref 14–18)
HGB BLD-MCNC: 13.4 G/DL — LOW (ref 14–18)
HGB BLD-MCNC: 13.6 G/DL — LOW (ref 14–18)
HGB BLD-MCNC: 13.8 G/DL — LOW (ref 14–18)
HGB BLD-MCNC: 13.8 G/DL — LOW (ref 14–18)
HGB BLD-MCNC: 13.9 G/DL — LOW (ref 14–18)
HGB BLD-MCNC: 14 G/DL — SIGNIFICANT CHANGE UP (ref 14–18)
HGB BLD-MCNC: 14 G/DL — SIGNIFICANT CHANGE UP (ref 14–18)
HGB BLD-MCNC: 14.3 G/DL — SIGNIFICANT CHANGE UP (ref 14–18)
HGB BLD-MCNC: 14.5 G/DL — SIGNIFICANT CHANGE UP (ref 14–18)
HGB BLD-MCNC: 14.9 G/DL — SIGNIFICANT CHANGE UP (ref 14–18)
HGB BLD-MCNC: 15.2 G/DL — SIGNIFICANT CHANGE UP (ref 14–18)
HGB BLD-MCNC: 15.8 G/DL — SIGNIFICANT CHANGE UP (ref 14–18)
IMM GRANULOCYTES NFR BLD AUTO: 2.2 % — HIGH (ref 0.1–0.3)
IMM GRANULOCYTES NFR BLD AUTO: 2.6 % — HIGH (ref 0.1–0.3)
IMM GRANULOCYTES NFR BLD AUTO: 3.6 % — HIGH (ref 0.1–0.3)
IMM GRANULOCYTES NFR BLD AUTO: 4.4 % — HIGH (ref 0.1–0.3)
IMM GRANULOCYTES NFR BLD AUTO: 5.1 % — HIGH (ref 0.1–0.3)
IMM GRANULOCYTES NFR BLD AUTO: 6.8 % — HIGH (ref 0.1–0.3)
IMM GRANULOCYTES NFR BLD AUTO: 7.3 % — HIGH (ref 0.1–0.3)
INR BLD: 0.93 RATIO — SIGNIFICANT CHANGE UP (ref 0.65–1.3)
INR BLD: 1.06 RATIO — SIGNIFICANT CHANGE UP (ref 0.65–1.3)
INR BLD: 1.4 RATIO — HIGH (ref 0.65–1.3)
INR BLD: 1.57 RATIO — HIGH (ref 0.65–1.3)
KETONES UR-MCNC: NEGATIVE — SIGNIFICANT CHANGE UP
LACTATE BLDV-MCNC: 1.9 MMOL/L — HIGH (ref 0.5–1.6)
LEUKOCYTE ESTERASE UR-ACNC: NEGATIVE — SIGNIFICANT CHANGE UP
LYMPHOCYTES # BLD AUTO: 0.65 K/UL — LOW (ref 1.2–3.4)
LYMPHOCYTES # BLD AUTO: 0.76 K/UL — LOW (ref 1.2–3.4)
LYMPHOCYTES # BLD AUTO: 0.93 K/UL — LOW (ref 1.2–3.4)
LYMPHOCYTES # BLD AUTO: 1.15 K/UL — LOW (ref 1.2–3.4)
LYMPHOCYTES # BLD AUTO: 1.59 K/UL — SIGNIFICANT CHANGE UP (ref 1.2–3.4)
LYMPHOCYTES # BLD AUTO: 1.77 K/UL — SIGNIFICANT CHANGE UP (ref 1.2–3.4)
LYMPHOCYTES # BLD AUTO: 1.77 K/UL — SIGNIFICANT CHANGE UP (ref 1.2–3.4)
LYMPHOCYTES # BLD AUTO: 1.99 K/UL — SIGNIFICANT CHANGE UP (ref 1.2–3.4)
LYMPHOCYTES # BLD AUTO: 12.7 % — LOW (ref 20.5–51.1)
LYMPHOCYTES # BLD AUTO: 14.3 % — LOW (ref 20.5–51.1)
LYMPHOCYTES # BLD AUTO: 14.9 % — LOW (ref 20.5–51.1)
LYMPHOCYTES # BLD AUTO: 15.6 % — LOW (ref 20.5–51.1)
LYMPHOCYTES # BLD AUTO: 17.3 % — LOW (ref 20.5–51.1)
LYMPHOCYTES # BLD AUTO: 5.4 % — LOW (ref 20.5–51.1)
LYMPHOCYTES # BLD AUTO: 7.1 % — LOW (ref 20.5–51.1)
LYMPHOCYTES # BLD AUTO: 7.6 % — LOW (ref 20.5–51.1)
MAGNESIUM SERPL-MCNC: 2.1 MG/DL — SIGNIFICANT CHANGE UP (ref 1.8–2.4)
MAGNESIUM SERPL-MCNC: 2.2 MG/DL — SIGNIFICANT CHANGE UP (ref 1.8–2.4)
MAGNESIUM SERPL-MCNC: 2.3 MG/DL — SIGNIFICANT CHANGE UP (ref 1.8–2.4)
MANUAL SMEAR VERIFICATION: SIGNIFICANT CHANGE UP
MCHC RBC-ENTMCNC: 27.5 PG — SIGNIFICANT CHANGE UP (ref 27–31)
MCHC RBC-ENTMCNC: 27.6 PG — SIGNIFICANT CHANGE UP (ref 27–31)
MCHC RBC-ENTMCNC: 27.6 PG — SIGNIFICANT CHANGE UP (ref 27–31)
MCHC RBC-ENTMCNC: 27.7 PG — SIGNIFICANT CHANGE UP (ref 27–31)
MCHC RBC-ENTMCNC: 27.8 PG — SIGNIFICANT CHANGE UP (ref 27–31)
MCHC RBC-ENTMCNC: 27.8 PG — SIGNIFICANT CHANGE UP (ref 27–31)
MCHC RBC-ENTMCNC: 27.9 PG — SIGNIFICANT CHANGE UP (ref 27–31)
MCHC RBC-ENTMCNC: 28.1 PG — SIGNIFICANT CHANGE UP (ref 27–31)
MCHC RBC-ENTMCNC: 28.3 PG — SIGNIFICANT CHANGE UP (ref 27–31)
MCHC RBC-ENTMCNC: 31.9 G/DL — LOW (ref 32–37)
MCHC RBC-ENTMCNC: 32.2 G/DL — SIGNIFICANT CHANGE UP (ref 32–37)
MCHC RBC-ENTMCNC: 32.2 G/DL — SIGNIFICANT CHANGE UP (ref 32–37)
MCHC RBC-ENTMCNC: 32.3 G/DL — SIGNIFICANT CHANGE UP (ref 32–37)
MCHC RBC-ENTMCNC: 32.3 G/DL — SIGNIFICANT CHANGE UP (ref 32–37)
MCHC RBC-ENTMCNC: 32.4 G/DL — SIGNIFICANT CHANGE UP (ref 32–37)
MCHC RBC-ENTMCNC: 32.5 G/DL — SIGNIFICANT CHANGE UP (ref 32–37)
MCHC RBC-ENTMCNC: 32.5 G/DL — SIGNIFICANT CHANGE UP (ref 32–37)
MCHC RBC-ENTMCNC: 32.6 G/DL — SIGNIFICANT CHANGE UP (ref 32–37)
MCHC RBC-ENTMCNC: 33 G/DL — SIGNIFICANT CHANGE UP (ref 32–37)
MCHC RBC-ENTMCNC: 33.2 G/DL — SIGNIFICANT CHANGE UP (ref 32–37)
MCHC RBC-ENTMCNC: 33.3 G/DL — SIGNIFICANT CHANGE UP (ref 32–37)
MCHC RBC-ENTMCNC: 33.4 G/DL — SIGNIFICANT CHANGE UP (ref 32–37)
MCV RBC AUTO: 83.1 FL — SIGNIFICANT CHANGE UP (ref 80–94)
MCV RBC AUTO: 83.3 FL — SIGNIFICANT CHANGE UP (ref 80–94)
MCV RBC AUTO: 83.4 FL — SIGNIFICANT CHANGE UP (ref 80–94)
MCV RBC AUTO: 85.1 FL — SIGNIFICANT CHANGE UP (ref 80–94)
MCV RBC AUTO: 85.3 FL — SIGNIFICANT CHANGE UP (ref 80–94)
MCV RBC AUTO: 85.7 FL — SIGNIFICANT CHANGE UP (ref 80–94)
MCV RBC AUTO: 85.7 FL — SIGNIFICANT CHANGE UP (ref 80–94)
MCV RBC AUTO: 86 FL — SIGNIFICANT CHANGE UP (ref 80–94)
MCV RBC AUTO: 86.1 FL — SIGNIFICANT CHANGE UP (ref 80–94)
MCV RBC AUTO: 86.4 FL — SIGNIFICANT CHANGE UP (ref 80–94)
MCV RBC AUTO: 86.5 FL — SIGNIFICANT CHANGE UP (ref 80–94)
MONOCYTES # BLD AUTO: 0.4 K/UL — SIGNIFICANT CHANGE UP (ref 0.1–0.6)
MONOCYTES # BLD AUTO: 0.69 K/UL — HIGH (ref 0.1–0.6)
MONOCYTES # BLD AUTO: 0.69 K/UL — HIGH (ref 0.1–0.6)
MONOCYTES # BLD AUTO: 0.71 K/UL — HIGH (ref 0.1–0.6)
MONOCYTES # BLD AUTO: 0.94 K/UL — HIGH (ref 0.1–0.6)
MONOCYTES # BLD AUTO: 0.99 K/UL — HIGH (ref 0.1–0.6)
MONOCYTES # BLD AUTO: 1.03 K/UL — HIGH (ref 0.1–0.6)
MONOCYTES # BLD AUTO: 1.08 K/UL — HIGH (ref 0.1–0.6)
MONOCYTES NFR BLD AUTO: 10.1 % — HIGH (ref 1.7–9.3)
MONOCYTES NFR BLD AUTO: 11.4 % — HIGH (ref 1.7–9.3)
MONOCYTES NFR BLD AUTO: 3.3 % — SIGNIFICANT CHANGE UP (ref 1.7–9.3)
MONOCYTES NFR BLD AUTO: 5.2 % — SIGNIFICANT CHANGE UP (ref 1.7–9.3)
MONOCYTES NFR BLD AUTO: 6.1 % — SIGNIFICANT CHANGE UP (ref 1.7–9.3)
MONOCYTES NFR BLD AUTO: 7.1 % — SIGNIFICANT CHANGE UP (ref 1.7–9.3)
MONOCYTES NFR BLD AUTO: 7.6 % — SIGNIFICANT CHANGE UP (ref 1.7–9.3)
MONOCYTES NFR BLD AUTO: 8.6 % — SIGNIFICANT CHANGE UP (ref 1.7–9.3)
MYELOCYTES NFR BLD: 2.6 % — HIGH (ref 0–0)
NEUTROPHILS # BLD AUTO: 10.48 K/UL — HIGH (ref 1.4–6.5)
NEUTROPHILS # BLD AUTO: 10.99 K/UL — HIGH (ref 1.4–6.5)
NEUTROPHILS # BLD AUTO: 6.58 K/UL — HIGH (ref 1.4–6.5)
NEUTROPHILS # BLD AUTO: 7.39 K/UL — HIGH (ref 1.4–6.5)
NEUTROPHILS # BLD AUTO: 7.62 K/UL — HIGH (ref 1.4–6.5)
NEUTROPHILS # BLD AUTO: 8.19 K/UL — HIGH (ref 1.4–6.5)
NEUTROPHILS # BLD AUTO: 8.2 K/UL — HIGH (ref 1.4–6.5)
NEUTROPHILS # BLD AUTO: 8.72 K/UL — HIGH (ref 1.4–6.5)
NEUTROPHILS NFR BLD AUTO: 66.2 % — SIGNIFICANT CHANGE UP (ref 42.2–75.2)
NEUTROPHILS NFR BLD AUTO: 69 % — SIGNIFICANT CHANGE UP (ref 42.2–75.2)
NEUTROPHILS NFR BLD AUTO: 70.1 % — SIGNIFICANT CHANGE UP (ref 42.2–75.2)
NEUTROPHILS NFR BLD AUTO: 72.2 % — SIGNIFICANT CHANGE UP (ref 42.2–75.2)
NEUTROPHILS NFR BLD AUTO: 72.8 % — SIGNIFICANT CHANGE UP (ref 42.2–75.2)
NEUTROPHILS NFR BLD AUTO: 82.3 % — HIGH (ref 42.2–75.2)
NEUTROPHILS NFR BLD AUTO: 83.3 % — HIGH (ref 42.2–75.2)
NEUTROPHILS NFR BLD AUTO: 86.2 % — HIGH (ref 42.2–75.2)
NITRITE UR-MCNC: NEGATIVE — SIGNIFICANT CHANGE UP
NRBC # BLD: 0 /100 WBCS — SIGNIFICANT CHANGE UP (ref 0–0)
NT-PROBNP SERPL-SCNC: 4860 PG/ML — HIGH (ref 0–300)
PCO2 BLDA: 32 MMHG — LOW (ref 38–42)
PCO2 BLDCOV: 44.2 MMHG — SIGNIFICANT CHANGE UP (ref 37.1–50.5)
PCO2 BLDV: 39 MMHG — LOW (ref 41–51)
PH BLDA: 7.54 — HIGH (ref 7.38–7.42)
PH BLDV: 7.5 — HIGH (ref 7.26–7.43)
PH UR: 6 — SIGNIFICANT CHANGE UP (ref 5–8)
PHOSPHATE SERPL-MCNC: 2.7 MG/DL — SIGNIFICANT CHANGE UP (ref 2.1–4.9)
PHOSPHATE SERPL-MCNC: 3.8 MG/DL — SIGNIFICANT CHANGE UP (ref 2.1–4.9)
PLAT MORPH BLD: NORMAL — SIGNIFICANT CHANGE UP
PLATELET # BLD AUTO: 211 K/UL — SIGNIFICANT CHANGE UP (ref 130–400)
PLATELET # BLD AUTO: 217 K/UL — SIGNIFICANT CHANGE UP (ref 130–400)
PLATELET # BLD AUTO: 223 K/UL — SIGNIFICANT CHANGE UP (ref 130–400)
PLATELET # BLD AUTO: 225 K/UL — SIGNIFICANT CHANGE UP (ref 130–400)
PLATELET # BLD AUTO: 230 K/UL — SIGNIFICANT CHANGE UP (ref 130–400)
PLATELET # BLD AUTO: 234 K/UL — SIGNIFICANT CHANGE UP (ref 130–400)
PLATELET # BLD AUTO: 235 K/UL — SIGNIFICANT CHANGE UP (ref 130–400)
PLATELET # BLD AUTO: 237 K/UL — SIGNIFICANT CHANGE UP (ref 130–400)
PLATELET # BLD AUTO: 240 K/UL — SIGNIFICANT CHANGE UP (ref 130–400)
PLATELET # BLD AUTO: 253 K/UL — SIGNIFICANT CHANGE UP (ref 130–400)
PLATELET # BLD AUTO: 256 K/UL — SIGNIFICANT CHANGE UP (ref 130–400)
PLATELET # BLD AUTO: 265 K/UL — SIGNIFICANT CHANGE UP (ref 130–400)
PLATELET # BLD AUTO: 267 K/UL — SIGNIFICANT CHANGE UP (ref 130–400)
PO2 BLDA: 106 MMHG — HIGH (ref 78–95)
PO2 BLDCOA: 42.6 MMHG — HIGH (ref 21.4–36)
PO2 BLDV: 45 MMHG — HIGH (ref 20–40)
POLYCHROMASIA BLD QL SMEAR: SLIGHT — SIGNIFICANT CHANGE UP
POTASSIUM BLDV-SCNC: 3 MMOL/L — LOW (ref 3.3–5.6)
POTASSIUM SERPL-MCNC: 3.1 MMOL/L — LOW (ref 3.5–5)
POTASSIUM SERPL-MCNC: 3.2 MMOL/L — LOW (ref 3.5–5)
POTASSIUM SERPL-MCNC: 3.4 MMOL/L — LOW (ref 3.5–5)
POTASSIUM SERPL-MCNC: 3.6 MMOL/L — SIGNIFICANT CHANGE UP (ref 3.5–5)
POTASSIUM SERPL-MCNC: 3.6 MMOL/L — SIGNIFICANT CHANGE UP (ref 3.5–5)
POTASSIUM SERPL-MCNC: 3.7 MMOL/L — SIGNIFICANT CHANGE UP (ref 3.5–5)
POTASSIUM SERPL-MCNC: 3.9 MMOL/L — SIGNIFICANT CHANGE UP (ref 3.5–5)
POTASSIUM SERPL-MCNC: 3.9 MMOL/L — SIGNIFICANT CHANGE UP (ref 3.5–5)
POTASSIUM SERPL-MCNC: 4 MMOL/L — SIGNIFICANT CHANGE UP (ref 3.5–5)
POTASSIUM SERPL-MCNC: 4 MMOL/L — SIGNIFICANT CHANGE UP (ref 3.5–5)
POTASSIUM SERPL-MCNC: 4.2 MMOL/L — SIGNIFICANT CHANGE UP (ref 3.5–5)
POTASSIUM SERPL-MCNC: 4.3 MMOL/L — SIGNIFICANT CHANGE UP (ref 3.5–5)
POTASSIUM SERPL-MCNC: 5.4 MMOL/L — HIGH (ref 3.5–5)
POTASSIUM SERPL-SCNC: 3.1 MMOL/L — LOW (ref 3.5–5)
POTASSIUM SERPL-SCNC: 3.2 MMOL/L — LOW (ref 3.5–5)
POTASSIUM SERPL-SCNC: 3.4 MMOL/L — LOW (ref 3.5–5)
POTASSIUM SERPL-SCNC: 3.6 MMOL/L — SIGNIFICANT CHANGE UP (ref 3.5–5)
POTASSIUM SERPL-SCNC: 3.6 MMOL/L — SIGNIFICANT CHANGE UP (ref 3.5–5)
POTASSIUM SERPL-SCNC: 3.7 MMOL/L — SIGNIFICANT CHANGE UP (ref 3.5–5)
POTASSIUM SERPL-SCNC: 3.9 MMOL/L — SIGNIFICANT CHANGE UP (ref 3.5–5)
POTASSIUM SERPL-SCNC: 3.9 MMOL/L — SIGNIFICANT CHANGE UP (ref 3.5–5)
POTASSIUM SERPL-SCNC: 4 MMOL/L — SIGNIFICANT CHANGE UP (ref 3.5–5)
POTASSIUM SERPL-SCNC: 4 MMOL/L — SIGNIFICANT CHANGE UP (ref 3.5–5)
POTASSIUM SERPL-SCNC: 4.2 MMOL/L — SIGNIFICANT CHANGE UP (ref 3.5–5)
POTASSIUM SERPL-SCNC: 4.3 MMOL/L — SIGNIFICANT CHANGE UP (ref 3.5–5)
POTASSIUM SERPL-SCNC: 5.4 MMOL/L — HIGH (ref 3.5–5)
PROT SERPL-MCNC: 5.4 G/DL — LOW (ref 6–8)
PROT SERPL-MCNC: 5.5 G/DL — LOW (ref 6–8)
PROT SERPL-MCNC: 5.6 G/DL — LOW (ref 6–8)
PROT SERPL-MCNC: 5.7 G/DL — LOW (ref 6–8)
PROT SERPL-MCNC: 5.9 G/DL — LOW (ref 6–8)
PROT SERPL-MCNC: 7.4 G/DL — SIGNIFICANT CHANGE UP (ref 6–8)
PROT UR-MCNC: SIGNIFICANT CHANGE UP
PROTHROM AB SERPL-ACNC: 10.7 SEC — SIGNIFICANT CHANGE UP (ref 9.95–12.87)
PROTHROM AB SERPL-ACNC: 12.2 SEC — SIGNIFICANT CHANGE UP (ref 9.95–12.87)
PROTHROM AB SERPL-ACNC: 16.1 SEC — HIGH (ref 9.95–12.87)
PROTHROM AB SERPL-ACNC: 18 SEC — HIGH (ref 9.95–12.87)
RBC # BLD: 4.66 M/UL — LOW (ref 4.7–6.1)
RBC # BLD: 4.8 M/UL — SIGNIFICANT CHANGE UP (ref 4.7–6.1)
RBC # BLD: 4.92 M/UL — SIGNIFICANT CHANGE UP (ref 4.7–6.1)
RBC # BLD: 4.98 M/UL — SIGNIFICANT CHANGE UP (ref 4.7–6.1)
RBC # BLD: 4.98 M/UL — SIGNIFICANT CHANGE UP (ref 4.7–6.1)
RBC # BLD: 5.01 M/UL — SIGNIFICANT CHANGE UP (ref 4.7–6.1)
RBC # BLD: 5.02 M/UL — SIGNIFICANT CHANGE UP (ref 4.7–6.1)
RBC # BLD: 5.04 M/UL — SIGNIFICANT CHANGE UP (ref 4.7–6.1)
RBC # BLD: 5.16 M/UL — SIGNIFICANT CHANGE UP (ref 4.7–6.1)
RBC # BLD: 5.22 M/UL — SIGNIFICANT CHANGE UP (ref 4.7–6.1)
RBC # BLD: 5.26 M/UL — SIGNIFICANT CHANGE UP (ref 4.7–6.1)
RBC # BLD: 5.51 M/UL — SIGNIFICANT CHANGE UP (ref 4.7–6.1)
RBC # BLD: 5.67 M/UL — SIGNIFICANT CHANGE UP (ref 4.7–6.1)
RBC # FLD: 14.5 % — SIGNIFICANT CHANGE UP (ref 11.5–14.5)
RBC # FLD: 14.6 % — HIGH (ref 11.5–14.5)
RBC # FLD: 14.6 % — HIGH (ref 11.5–14.5)
RBC # FLD: 14.7 % — HIGH (ref 11.5–14.5)
RBC # FLD: 14.8 % — HIGH (ref 11.5–14.5)
RBC # FLD: 14.8 % — HIGH (ref 11.5–14.5)
RBC # FLD: 14.9 % — HIGH (ref 11.5–14.5)
RBC # FLD: 15 % — HIGH (ref 11.5–14.5)
RBC # FLD: 15 % — HIGH (ref 11.5–14.5)
RBC # FLD: 15.1 % — HIGH (ref 11.5–14.5)
RBC # FLD: 15.3 % — HIGH (ref 11.5–14.5)
RBC BLD AUTO: NORMAL — SIGNIFICANT CHANGE UP
SAO2 % BLDA: 97 % — SIGNIFICANT CHANGE UP (ref 94–98)
SAO2 % BLDCOV: 79 % — LOW (ref 94–98)
SAO2 % BLDV: 81 % — SIGNIFICANT CHANGE UP
SMUDGE CELLS # BLD: PRESENT — SIGNIFICANT CHANGE UP
SODIUM SERPL-SCNC: 131 MMOL/L — LOW (ref 135–146)
SODIUM SERPL-SCNC: 132 MMOL/L — LOW (ref 135–146)
SODIUM SERPL-SCNC: 134 MMOL/L — LOW (ref 135–146)
SODIUM SERPL-SCNC: 135 MMOL/L — SIGNIFICANT CHANGE UP (ref 135–146)
SODIUM SERPL-SCNC: 135 MMOL/L — SIGNIFICANT CHANGE UP (ref 135–146)
SODIUM SERPL-SCNC: 136 MMOL/L — SIGNIFICANT CHANGE UP (ref 135–146)
SODIUM SERPL-SCNC: 137 MMOL/L — SIGNIFICANT CHANGE UP (ref 135–146)
SODIUM SERPL-SCNC: 138 MMOL/L — SIGNIFICANT CHANGE UP (ref 135–146)
SODIUM SERPL-SCNC: 139 MMOL/L — SIGNIFICANT CHANGE UP (ref 135–146)
SODIUM SERPL-SCNC: 142 MMOL/L — SIGNIFICANT CHANGE UP (ref 135–146)
SP GR SPEC: 1.01 — SIGNIFICANT CHANGE UP (ref 1.01–1.02)
SPECIMEN SOURCE: SIGNIFICANT CHANGE UP
TROPONIN T SERPL-MCNC: 0.05 NG/ML — CRITICAL HIGH
TROPONIN T SERPL-MCNC: 0.06 NG/ML — CRITICAL HIGH
TROPONIN T SERPL-MCNC: 0.15 NG/ML — CRITICAL HIGH
UROBILINOGEN FLD QL: SIGNIFICANT CHANGE UP
VARIANT LYMPHS # BLD: 2.6 % — SIGNIFICANT CHANGE UP (ref 0–5)
WBC # BLD: 10.7 K/UL — SIGNIFICANT CHANGE UP (ref 4.8–10.8)
WBC # BLD: 11.35 K/UL — HIGH (ref 4.8–10.8)
WBC # BLD: 11.51 K/UL — HIGH (ref 4.8–10.8)
WBC # BLD: 11.73 K/UL — HIGH (ref 4.8–10.8)
WBC # BLD: 12.14 K/UL — HIGH (ref 4.8–10.8)
WBC # BLD: 12.19 K/UL — HIGH (ref 4.8–10.8)
WBC # BLD: 12.42 K/UL — HIGH (ref 4.8–10.8)
WBC # BLD: 13.14 K/UL — HIGH (ref 4.8–10.8)
WBC # BLD: 13.19 K/UL — HIGH (ref 4.8–10.8)
WBC # BLD: 13.41 K/UL — HIGH (ref 4.8–10.8)
WBC # BLD: 14.91 K/UL — HIGH (ref 4.8–10.8)
WBC # BLD: 9.04 K/UL — SIGNIFICANT CHANGE UP (ref 4.8–10.8)
WBC # BLD: 9.97 K/UL — SIGNIFICANT CHANGE UP (ref 4.8–10.8)
WBC # FLD AUTO: 10.7 K/UL — SIGNIFICANT CHANGE UP (ref 4.8–10.8)
WBC # FLD AUTO: 11.35 K/UL — HIGH (ref 4.8–10.8)
WBC # FLD AUTO: 11.51 K/UL — HIGH (ref 4.8–10.8)
WBC # FLD AUTO: 11.73 K/UL — HIGH (ref 4.8–10.8)
WBC # FLD AUTO: 12.14 K/UL — HIGH (ref 4.8–10.8)
WBC # FLD AUTO: 12.19 K/UL — HIGH (ref 4.8–10.8)
WBC # FLD AUTO: 12.42 K/UL — HIGH (ref 4.8–10.8)
WBC # FLD AUTO: 13.14 K/UL — HIGH (ref 4.8–10.8)
WBC # FLD AUTO: 13.19 K/UL — HIGH (ref 4.8–10.8)
WBC # FLD AUTO: 13.41 K/UL — HIGH (ref 4.8–10.8)
WBC # FLD AUTO: 14.91 K/UL — HIGH (ref 4.8–10.8)
WBC # FLD AUTO: 9.04 K/UL — SIGNIFICANT CHANGE UP (ref 4.8–10.8)
WBC # FLD AUTO: 9.97 K/UL — SIGNIFICANT CHANGE UP (ref 4.8–10.8)

## 2019-01-01 PROCEDURE — 95806 SLEEP STUDY UNATT&RESP EFFT: CPT

## 2019-01-01 PROCEDURE — 99214 OFFICE O/P EST MOD 30 MIN: CPT

## 2019-01-01 PROCEDURE — 93010 ELECTROCARDIOGRAM REPORT: CPT

## 2019-01-01 PROCEDURE — 99233 SBSQ HOSP IP/OBS HIGH 50: CPT

## 2019-01-01 PROCEDURE — 99205 OFFICE O/P NEW HI 60 MIN: CPT

## 2019-01-01 PROCEDURE — 93306 TTE W/DOPPLER COMPLETE: CPT | Mod: 26

## 2019-01-01 PROCEDURE — 71045 X-RAY EXAM CHEST 1 VIEW: CPT | Mod: 26

## 2019-01-01 PROCEDURE — 99285 EMERGENCY DEPT VISIT HI MDM: CPT

## 2019-01-01 PROCEDURE — 71275 CT ANGIOGRAPHY CHEST: CPT | Mod: 26

## 2019-01-01 PROCEDURE — 92950 HEART/LUNG RESUSCITATION CPR: CPT

## 2019-01-01 PROCEDURE — 93000 ELECTROCARDIOGRAM COMPLETE: CPT

## 2019-01-01 PROCEDURE — 99285 EMERGENCY DEPT VISIT HI MDM: CPT | Mod: 25

## 2019-01-01 PROCEDURE — 99232 SBSQ HOSP IP/OBS MODERATE 35: CPT

## 2019-01-01 PROCEDURE — 93979 VASCULAR STUDY: CPT | Mod: 26

## 2019-01-01 PROCEDURE — 99239 HOSP IP/OBS DSCHRG MGMT >30: CPT

## 2019-01-01 PROCEDURE — 93970 EXTREMITY STUDY: CPT | Mod: 26

## 2019-01-01 PROCEDURE — 99291 CRITICAL CARE FIRST HOUR: CPT | Mod: GC

## 2019-01-01 RX ORDER — RIVAROXABAN 15 MG-20MG
1 KIT ORAL
Qty: 30 | Refills: 0
Start: 2019-01-01 | End: 2019-01-01

## 2019-01-01 RX ORDER — INSULIN GLARGINE 100 [IU]/ML
30 INJECTION, SOLUTION SUBCUTANEOUS AT BEDTIME
Refills: 0 | Status: DISCONTINUED | OUTPATIENT
Start: 2019-01-01 | End: 2019-01-01

## 2019-01-01 RX ORDER — METOPROLOL TARTRATE 50 MG
25 TABLET ORAL
Refills: 0 | Status: DISCONTINUED | OUTPATIENT
Start: 2019-01-01 | End: 2019-01-01

## 2019-01-01 RX ORDER — CHLORHEXIDINE GLUCONATE 213 G/1000ML
1 SOLUTION TOPICAL
Refills: 0 | Status: DISCONTINUED | OUTPATIENT
Start: 2019-01-01 | End: 2019-01-01

## 2019-01-01 RX ORDER — INSULIN LISPRO 100/ML
10 VIAL (ML) SUBCUTANEOUS
Refills: 0 | Status: DISCONTINUED | OUTPATIENT
Start: 2019-01-01 | End: 2019-01-01

## 2019-01-01 RX ORDER — GABAPENTIN 100 MG/1
100 CAPSULE ORAL
Refills: 0 | Status: ACTIVE | COMMUNITY
Start: 2019-01-01

## 2019-01-01 RX ORDER — NEBIVOLOL HYDROCHLORIDE 20 MG/1
20 TABLET ORAL DAILY
Qty: 1 | Refills: 0 | Status: ACTIVE | COMMUNITY
Start: 2017-12-06 | End: 1900-01-01

## 2019-01-01 RX ORDER — BUDESONIDE AND FORMOTEROL FUMARATE DIHYDRATE 160; 4.5 UG/1; UG/1
2 AEROSOL RESPIRATORY (INHALATION)
Refills: 0 | Status: DISCONTINUED | OUTPATIENT
Start: 2019-01-01 | End: 2019-01-01

## 2019-01-01 RX ORDER — IPRATROPIUM/ALBUTEROL SULFATE 18-103MCG
3 AEROSOL WITH ADAPTER (GRAM) INHALATION EVERY 4 HOURS
Refills: 0 | Status: DISCONTINUED | OUTPATIENT
Start: 2019-01-01 | End: 2019-01-01

## 2019-01-01 RX ORDER — BUDESONIDE AND FORMOTEROL FUMARATE DIHYDRATE 160; 4.5 UG/1; UG/1
2 AEROSOL RESPIRATORY (INHALATION)
Qty: 0 | Refills: 0 | DISCHARGE
Start: 2019-01-01

## 2019-01-01 RX ORDER — GABAPENTIN 100 MG/1
100 CAPSULE ORAL
Qty: 120 | Refills: 0 | Status: ACTIVE | COMMUNITY
Start: 2019-01-01 | End: 1900-01-01

## 2019-01-01 RX ORDER — FUROSEMIDE 40 MG
40 TABLET ORAL EVERY 12 HOURS
Refills: 0 | Status: DISCONTINUED | OUTPATIENT
Start: 2019-01-01 | End: 2019-01-01

## 2019-01-01 RX ORDER — HYDRALAZINE HYDROCHLORIDE 50 MG/1
50 TABLET ORAL TWICE DAILY
Qty: 60 | Refills: 0 | Status: ACTIVE | COMMUNITY
Start: 2019-01-01 | End: 1900-01-01

## 2019-01-01 RX ORDER — FUROSEMIDE 40 MG
40 TABLET ORAL
Refills: 0 | Status: DISCONTINUED | OUTPATIENT
Start: 2019-01-01 | End: 2019-01-01

## 2019-01-01 RX ORDER — APIXABAN 2.5 MG/1
2 TABLET, FILM COATED ORAL
Qty: 16 | Refills: 0
Start: 2019-01-01 | End: 2019-01-01

## 2019-01-01 RX ORDER — HEPARIN SODIUM 5000 [USP'U]/ML
10000 INJECTION INTRAVENOUS; SUBCUTANEOUS ONCE
Refills: 0 | Status: DISCONTINUED | OUTPATIENT
Start: 2019-01-01 | End: 2019-01-01

## 2019-01-01 RX ORDER — NIFEDIPINE 30 MG
60 TABLET, EXTENDED RELEASE 24 HR ORAL DAILY
Refills: 0 | Status: DISCONTINUED | OUTPATIENT
Start: 2019-01-01 | End: 2019-01-01

## 2019-01-01 RX ORDER — DULOXETINE HYDROCHLORIDE 30 MG/1
30 CAPSULE, DELAYED RELEASE PELLETS ORAL
Qty: 30 | Refills: 3 | Status: ACTIVE | COMMUNITY
Start: 2019-01-01 | End: 1900-01-01

## 2019-01-01 RX ORDER — FUROSEMIDE 40 MG
1 TABLET ORAL
Qty: 60 | Refills: 0
Start: 2019-01-01 | End: 2019-01-01

## 2019-01-01 RX ORDER — NIFEDIPINE 30 MG
60 TABLET, EXTENDED RELEASE 24 HR ORAL AT BEDTIME
Refills: 0 | Status: DISCONTINUED | OUTPATIENT
Start: 2019-01-01 | End: 2019-01-01

## 2019-01-01 RX ORDER — DULAGLUTIDE 4.5 MG/.5ML
0 INJECTION, SOLUTION SUBCUTANEOUS
Qty: 0 | Refills: 0 | DISCHARGE

## 2019-01-01 RX ORDER — NIFEDIPINE 60 MG/1
60 TABLET, EXTENDED RELEASE ORAL DAILY
Refills: 0 | Status: ACTIVE | COMMUNITY

## 2019-01-01 RX ORDER — HYDRALAZINE HCL 50 MG
0 TABLET ORAL
Qty: 0 | Refills: 0 | DISCHARGE

## 2019-01-01 RX ORDER — SILDENAFIL 100 MG/1
100 TABLET, FILM COATED ORAL
Qty: 10 | Refills: 2 | Status: DISCONTINUED | COMMUNITY
Start: 2018-06-14 | End: 2019-01-01

## 2019-01-01 RX ORDER — TIOTROPIUM BROMIDE 18 UG/1
1 CAPSULE ORAL; RESPIRATORY (INHALATION) ONCE
Refills: 0 | Status: COMPLETED | OUTPATIENT
Start: 2019-01-01 | End: 2019-01-01

## 2019-01-01 RX ORDER — CYCLOBENZAPRINE HYDROCHLORIDE 10 MG/1
10 TABLET, FILM COATED ORAL
Refills: 0 | Status: DISCONTINUED | COMMUNITY
End: 2019-01-01

## 2019-01-01 RX ORDER — ZOLPIDEM TARTRATE 5 MG/1
5 SPRAY, METERED ORAL
Refills: 0 | Status: ACTIVE | COMMUNITY

## 2019-01-01 RX ORDER — DEXTROSE 50 % IN WATER 50 %
25 SYRINGE (ML) INTRAVENOUS ONCE
Refills: 0 | Status: DISCONTINUED | OUTPATIENT
Start: 2019-01-01 | End: 2019-01-01

## 2019-01-01 RX ORDER — INFLUENZA VIRUS VACCINE 15; 15; 15; 15 UG/.5ML; UG/.5ML; UG/.5ML; UG/.5ML
0.5 SUSPENSION INTRAMUSCULAR ONCE
Refills: 0 | Status: COMPLETED | OUTPATIENT
Start: 2019-01-01 | End: 2019-01-01

## 2019-01-01 RX ORDER — INSULIN GLARGINE 100 [IU]/ML
36 INJECTION, SOLUTION SUBCUTANEOUS AT BEDTIME
Refills: 0 | Status: DISCONTINUED | OUTPATIENT
Start: 2019-01-01 | End: 2019-01-01

## 2019-01-01 RX ORDER — POTASSIUM CHLORIDE 20 MEQ
40 PACKET (EA) ORAL EVERY 4 HOURS
Refills: 0 | Status: COMPLETED | OUTPATIENT
Start: 2019-01-01 | End: 2019-01-01

## 2019-01-01 RX ORDER — INSULIN LISPRO 100/ML
10 VIAL (ML) SUBCUTANEOUS
Qty: 10 | Refills: 0
Start: 2019-01-01 | End: 2019-01-01

## 2019-01-01 RX ORDER — INSULIN LISPRO 100/ML
VIAL (ML) SUBCUTANEOUS
Refills: 0 | Status: DISCONTINUED | OUTPATIENT
Start: 2019-01-01 | End: 2019-01-01

## 2019-01-01 RX ORDER — INSULIN LISPRO 100/ML
12 VIAL (ML) SUBCUTANEOUS
Refills: 0 | Status: DISCONTINUED | OUTPATIENT
Start: 2019-01-01 | End: 2019-01-01

## 2019-01-01 RX ORDER — HYDRALAZINE HCL 50 MG
1 TABLET ORAL
Qty: 45 | Refills: 0
Start: 2019-01-01 | End: 2019-01-01

## 2019-01-01 RX ORDER — FUROSEMIDE 40 MG
20 TABLET ORAL
Refills: 0 | Status: DISCONTINUED | OUTPATIENT
Start: 2019-01-01 | End: 2019-01-01

## 2019-01-01 RX ORDER — ASPIRIN/CALCIUM CARB/MAGNESIUM 324 MG
81 TABLET ORAL DAILY
Refills: 0 | Status: DISCONTINUED | OUTPATIENT
Start: 2019-01-01 | End: 2019-01-01

## 2019-01-01 RX ORDER — HYDRALAZINE HCL 50 MG
100 TABLET ORAL EVERY 8 HOURS
Refills: 0 | Status: DISCONTINUED | OUTPATIENT
Start: 2019-01-01 | End: 2019-01-01

## 2019-01-01 RX ORDER — POTASSIUM CHLORIDE 20 MEQ
20 PACKET (EA) ORAL
Refills: 0 | Status: DISCONTINUED | OUTPATIENT
Start: 2019-01-01 | End: 2019-01-01

## 2019-01-01 RX ORDER — DEXTROSE 50 % IN WATER 50 %
15 SYRINGE (ML) INTRAVENOUS ONCE
Refills: 0 | Status: DISCONTINUED | OUTPATIENT
Start: 2019-01-01 | End: 2019-01-01

## 2019-01-01 RX ORDER — HEPARIN SODIUM 5000 [USP'U]/ML
10000 INJECTION INTRAVENOUS; SUBCUTANEOUS ONCE
Refills: 0 | Status: COMPLETED | OUTPATIENT
Start: 2019-01-01 | End: 2019-01-01

## 2019-01-01 RX ORDER — GLUCAGON INJECTION, SOLUTION 0.5 MG/.1ML
1 INJECTION, SOLUTION SUBCUTANEOUS ONCE
Refills: 0 | Status: DISCONTINUED | OUTPATIENT
Start: 2019-01-01 | End: 2019-01-01

## 2019-01-01 RX ORDER — NIFEDIPINE 30 MG
1 TABLET, EXTENDED RELEASE 24 HR ORAL
Qty: 30 | Refills: 0
Start: 2019-01-01 | End: 2019-01-01

## 2019-01-01 RX ORDER — RIVAROXABAN 15 MG-20MG
15 KIT ORAL
Refills: 0 | Status: DISCONTINUED | OUTPATIENT
Start: 2019-01-01 | End: 2019-01-01

## 2019-01-01 RX ORDER — APIXABAN 2.5 MG/1
10 TABLET, FILM COATED ORAL EVERY 12 HOURS
Refills: 0 | Status: COMPLETED | OUTPATIENT
Start: 2019-01-01 | End: 2019-01-01

## 2019-01-01 RX ORDER — LISINOPRIL 2.5 MG/1
1 TABLET ORAL
Qty: 30 | Refills: 0
Start: 2019-01-01 | End: 2019-01-01

## 2019-01-01 RX ORDER — INSULIN LISPRO 100/ML
15 VIAL (ML) SUBCUTANEOUS
Refills: 0 | Status: DISCONTINUED | OUTPATIENT
Start: 2019-01-01 | End: 2019-01-01

## 2019-01-01 RX ORDER — APIXABAN 2.5 MG/1
1 TABLET, FILM COATED ORAL
Qty: 60 | Refills: 0
Start: 2019-01-01 | End: 2019-01-01

## 2019-01-01 RX ORDER — INSULIN LISPRO 100/ML
10 VIAL (ML) SUBCUTANEOUS ONCE
Refills: 0 | Status: COMPLETED | OUTPATIENT
Start: 2019-01-01 | End: 2019-01-01

## 2019-01-01 RX ORDER — ALPRAZOLAM 0.25 MG
0.25 TABLET ORAL ONCE
Refills: 0 | Status: DISCONTINUED | OUTPATIENT
Start: 2019-01-01 | End: 2019-01-01

## 2019-01-01 RX ORDER — ALBUTEROL 90 UG/1
1 AEROSOL, METERED ORAL ONCE
Refills: 0 | Status: COMPLETED | OUTPATIENT
Start: 2019-01-01 | End: 2019-01-01

## 2019-01-01 RX ORDER — HEPARIN SODIUM 5000 [USP'U]/ML
INJECTION INTRAVENOUS; SUBCUTANEOUS
Qty: 25000 | Refills: 0 | Status: DISCONTINUED | OUTPATIENT
Start: 2019-01-01 | End: 2019-01-01

## 2019-01-01 RX ORDER — HYDRALAZINE HCL 50 MG
25 TABLET ORAL THREE TIMES A DAY
Refills: 0 | Status: DISCONTINUED | OUTPATIENT
Start: 2019-01-01 | End: 2019-01-01

## 2019-01-01 RX ORDER — IPRATROPIUM/ALBUTEROL SULFATE 18-103MCG
3 AEROSOL WITH ADAPTER (GRAM) INHALATION
Qty: 3 | Refills: 0
Start: 2019-01-01 | End: 2019-01-01

## 2019-01-01 RX ORDER — HYDRALAZINE HCL 50 MG
50 TABLET ORAL
Refills: 0 | Status: DISCONTINUED | OUTPATIENT
Start: 2019-01-01 | End: 2019-01-01

## 2019-01-01 RX ORDER — RIVAROXABAN 15 MG-20MG
1 KIT ORAL
Qty: 40 | Refills: 0
Start: 2019-01-01 | End: 2019-01-01

## 2019-01-01 RX ORDER — LISINOPRIL 40 MG/1
40 TABLET ORAL TWICE DAILY
Qty: 60 | Refills: 3 | Status: ACTIVE | COMMUNITY
Start: 1900-01-01 | End: 1900-01-01

## 2019-01-01 RX ORDER — PANTOPRAZOLE SODIUM 20 MG/1
40 TABLET, DELAYED RELEASE ORAL
Refills: 0 | Status: DISCONTINUED | OUTPATIENT
Start: 2019-01-01 | End: 2019-01-01

## 2019-01-01 RX ORDER — LISINOPRIL 2.5 MG/1
40 TABLET ORAL DAILY
Refills: 0 | Status: DISCONTINUED | OUTPATIENT
Start: 2019-01-01 | End: 2019-01-01

## 2019-01-01 RX ORDER — METOLAZONE 5 MG/1
5 TABLET ORAL DAILY
Qty: 60 | Refills: 2 | Status: ACTIVE | COMMUNITY
Start: 2019-01-01 | End: 1900-01-01

## 2019-01-01 RX ORDER — INSULIN GLARGINE 100 [IU]/ML
30 INJECTION, SOLUTION SUBCUTANEOUS
Qty: 10 | Refills: 0
Start: 2019-01-01 | End: 2019-01-01

## 2019-01-01 RX ORDER — AMLODIPINE BESYLATE 2.5 MG/1
10 TABLET ORAL DAILY
Refills: 0 | Status: DISCONTINUED | OUTPATIENT
Start: 2019-01-01 | End: 2019-01-01

## 2019-01-01 RX ORDER — INSULIN HUMAN 100 [IU]/ML
10 INJECTION, SOLUTION SUBCUTANEOUS
Qty: 100 | Refills: 0 | Status: DISCONTINUED | OUTPATIENT
Start: 2019-01-01 | End: 2019-01-01

## 2019-01-01 RX ORDER — OXYCODONE AND ACETAMINOPHEN 5; 325 MG/1; MG/1
1 TABLET ORAL EVERY 6 HOURS
Refills: 0 | Status: DISCONTINUED | OUTPATIENT
Start: 2019-01-01 | End: 2019-01-01

## 2019-01-01 RX ORDER — SODIUM CHLORIDE 9 MG/ML
1000 INJECTION, SOLUTION INTRAVENOUS
Refills: 0 | Status: DISCONTINUED | OUTPATIENT
Start: 2019-01-01 | End: 2019-01-01

## 2019-01-01 RX ORDER — AMLODIPINE BESYLATE 2.5 MG/1
1 TABLET ORAL
Qty: 0 | Refills: 0 | DISCHARGE

## 2019-01-01 RX ORDER — CANAGLIFLOZIN 100 MG/1
1 TABLET, FILM COATED ORAL
Qty: 0 | Refills: 0 | DISCHARGE

## 2019-01-01 RX ORDER — INSULIN GLARGINE 100 [IU]/ML
30 INJECTION, SOLUTION SUBCUTANEOUS EVERY MORNING
Refills: 0 | Status: DISCONTINUED | OUTPATIENT
Start: 2019-01-01 | End: 2019-01-01

## 2019-01-01 RX ORDER — ALBUTEROL 90 UG/1
2 AEROSOL, METERED ORAL EVERY 6 HOURS
Refills: 0 | Status: DISCONTINUED | OUTPATIENT
Start: 2019-01-01 | End: 2019-01-01

## 2019-01-01 RX ORDER — ENOXAPARIN SODIUM 100 MG/ML
142 INJECTION SUBCUTANEOUS
Refills: 0 | Status: DISCONTINUED | OUTPATIENT
Start: 2019-01-01 | End: 2019-01-01

## 2019-01-01 RX ORDER — HYDRALAZINE HYDROCHLORIDE 25 MG/1
25 TABLET ORAL 3 TIMES DAILY
Qty: 270 | Refills: 1 | Status: ACTIVE | COMMUNITY
Start: 2018-01-01 | End: 1900-01-01

## 2019-01-01 RX ORDER — ALBUTEROL SULFATE 90 UG/1
108 (90 BASE) INHALANT RESPIRATORY (INHALATION)
Qty: 18 | Refills: 0 | Status: ACTIVE | COMMUNITY
Start: 2019-01-01

## 2019-01-01 RX ORDER — RIVAROXABAN 15 MG-20MG
1 KIT ORAL
Qty: 51 | Refills: 0
Start: 2019-01-01 | End: 2019-01-01

## 2019-01-01 RX ORDER — DEXTROSE 50 % IN WATER 50 %
12.5 SYRINGE (ML) INTRAVENOUS ONCE
Refills: 0 | Status: DISCONTINUED | OUTPATIENT
Start: 2019-01-01 | End: 2019-01-01

## 2019-01-01 RX ORDER — SODIUM CHLORIDE 9 MG/ML
1000 INJECTION INTRAMUSCULAR; INTRAVENOUS; SUBCUTANEOUS ONCE
Refills: 0 | Status: DISCONTINUED | OUTPATIENT
Start: 2019-01-01 | End: 2019-01-01

## 2019-01-01 RX ADMIN — Medication 60 MILLIGRAM(S): at 05:26

## 2019-01-01 RX ADMIN — BUDESONIDE AND FORMOTEROL FUMARATE DIHYDRATE 2 PUFF(S): 160; 4.5 AEROSOL RESPIRATORY (INHALATION) at 08:24

## 2019-01-01 RX ADMIN — Medication 40 MILLIGRAM(S): at 17:29

## 2019-01-01 RX ADMIN — Medication 4: at 18:44

## 2019-01-01 RX ADMIN — INSULIN GLARGINE 30 UNIT(S): 100 INJECTION, SOLUTION SUBCUTANEOUS at 21:42

## 2019-01-01 RX ADMIN — Medication 100 MILLIGRAM(S): at 15:24

## 2019-01-01 RX ADMIN — Medication 3 MILLILITER(S): at 16:42

## 2019-01-01 RX ADMIN — Medication 2: at 08:37

## 2019-01-01 RX ADMIN — APIXABAN 10 MILLIGRAM(S): 2.5 TABLET, FILM COATED ORAL at 17:28

## 2019-01-01 RX ADMIN — Medication 25 MILLIGRAM(S): at 17:12

## 2019-01-01 RX ADMIN — Medication 25 MILLIGRAM(S): at 05:30

## 2019-01-01 RX ADMIN — OXYCODONE AND ACETAMINOPHEN 1 TABLET(S): 5; 325 TABLET ORAL at 17:58

## 2019-01-01 RX ADMIN — Medication 0.2 MILLIGRAM(S): at 22:03

## 2019-01-01 RX ADMIN — OXYCODONE AND ACETAMINOPHEN 1 TABLET(S): 5; 325 TABLET ORAL at 22:05

## 2019-01-01 RX ADMIN — Medication 25 MILLIGRAM(S): at 17:29

## 2019-01-01 RX ADMIN — Medication 0.2 MILLIGRAM(S): at 05:45

## 2019-01-01 RX ADMIN — Medication 0.2 MILLIGRAM(S): at 21:16

## 2019-01-01 RX ADMIN — Medication 25 MILLIGRAM(S): at 05:45

## 2019-01-01 RX ADMIN — Medication 15 UNIT(S): at 12:02

## 2019-01-01 RX ADMIN — BUDESONIDE AND FORMOTEROL FUMARATE DIHYDRATE 2 PUFF(S): 160; 4.5 AEROSOL RESPIRATORY (INHALATION) at 19:40

## 2019-01-01 RX ADMIN — Medication 0.2 MILLIGRAM(S): at 15:14

## 2019-01-01 RX ADMIN — Medication 10 UNIT(S): at 08:13

## 2019-01-01 RX ADMIN — Medication 3 MILLILITER(S): at 20:10

## 2019-01-01 RX ADMIN — Medication 0.25 MILLIGRAM(S): at 17:24

## 2019-01-01 RX ADMIN — Medication 3 MILLILITER(S): at 13:33

## 2019-01-01 RX ADMIN — APIXABAN 10 MILLIGRAM(S): 2.5 TABLET, FILM COATED ORAL at 17:39

## 2019-01-01 RX ADMIN — Medication 40 MILLIGRAM(S): at 05:45

## 2019-01-01 RX ADMIN — RIVAROXABAN 15 MILLIGRAM(S): KIT at 17:29

## 2019-01-01 RX ADMIN — Medication 25 MILLIGRAM(S): at 05:33

## 2019-01-01 RX ADMIN — Medication 25 MILLIGRAM(S): at 21:45

## 2019-01-01 RX ADMIN — Medication 0.2 MILLIGRAM(S): at 21:42

## 2019-01-01 RX ADMIN — Medication 40 MILLIGRAM(S): at 05:44

## 2019-01-01 RX ADMIN — Medication 81 MILLIGRAM(S): at 13:05

## 2019-01-01 RX ADMIN — INSULIN GLARGINE 30 UNIT(S): 100 INJECTION, SOLUTION SUBCUTANEOUS at 21:58

## 2019-01-01 RX ADMIN — RIVAROXABAN 15 MILLIGRAM(S): KIT at 08:37

## 2019-01-01 RX ADMIN — Medication 3 MILLILITER(S): at 20:22

## 2019-01-01 RX ADMIN — Medication 4: at 12:12

## 2019-01-01 RX ADMIN — Medication 100 MILLIGRAM(S): at 05:45

## 2019-01-01 RX ADMIN — INSULIN GLARGINE 30 UNIT(S): 100 INJECTION, SOLUTION SUBCUTANEOUS at 08:47

## 2019-01-01 RX ADMIN — AMLODIPINE BESYLATE 10 MILLIGRAM(S): 2.5 TABLET ORAL at 05:45

## 2019-01-01 RX ADMIN — Medication 60 MILLIGRAM(S): at 21:57

## 2019-01-01 RX ADMIN — BUDESONIDE AND FORMOTEROL FUMARATE DIHYDRATE 2 PUFF(S): 160; 4.5 AEROSOL RESPIRATORY (INHALATION) at 21:12

## 2019-01-01 RX ADMIN — Medication 25 MILLIGRAM(S): at 18:43

## 2019-01-01 RX ADMIN — Medication 3 MILLILITER(S): at 11:50

## 2019-01-01 RX ADMIN — Medication 0.2 MILLIGRAM(S): at 05:30

## 2019-01-01 RX ADMIN — Medication 0.2 MILLIGRAM(S): at 15:52

## 2019-01-01 RX ADMIN — Medication 3 MILLILITER(S): at 15:46

## 2019-01-01 RX ADMIN — APIXABAN 10 MILLIGRAM(S): 2.5 TABLET, FILM COATED ORAL at 05:27

## 2019-01-01 RX ADMIN — Medication 25 MILLIGRAM(S): at 14:00

## 2019-01-01 RX ADMIN — Medication 10 UNIT(S): at 22:33

## 2019-01-01 RX ADMIN — Medication 81 MILLIGRAM(S): at 12:18

## 2019-01-01 RX ADMIN — LISINOPRIL 40 MILLIGRAM(S): 2.5 TABLET ORAL at 05:30

## 2019-01-01 RX ADMIN — Medication 15 UNIT(S): at 08:36

## 2019-01-01 RX ADMIN — Medication 3 MILLILITER(S): at 08:24

## 2019-01-01 RX ADMIN — CHLORHEXIDINE GLUCONATE 1 APPLICATION(S): 213 SOLUTION TOPICAL at 05:29

## 2019-01-01 RX ADMIN — Medication 81 MILLIGRAM(S): at 11:11

## 2019-01-01 RX ADMIN — Medication 0.2 MILLIGRAM(S): at 05:27

## 2019-01-01 RX ADMIN — Medication 100 MILLIGRAM(S): at 21:16

## 2019-01-01 RX ADMIN — Medication 0.2 MILLIGRAM(S): at 13:57

## 2019-01-01 RX ADMIN — Medication 25 MILLIGRAM(S): at 05:27

## 2019-01-01 RX ADMIN — Medication 40 MILLIGRAM(S): at 13:58

## 2019-01-01 RX ADMIN — Medication 50 MILLIGRAM(S): at 17:18

## 2019-01-01 RX ADMIN — Medication 15 UNIT(S): at 08:46

## 2019-01-01 RX ADMIN — Medication 15 UNIT(S): at 17:39

## 2019-01-01 RX ADMIN — Medication 25 MILLIGRAM(S): at 13:07

## 2019-01-01 RX ADMIN — RIVAROXABAN 15 MILLIGRAM(S): KIT at 22:03

## 2019-01-01 RX ADMIN — Medication 40 MILLIEQUIVALENT(S): at 17:00

## 2019-01-01 RX ADMIN — Medication 0.2 MILLIGRAM(S): at 13:06

## 2019-01-01 RX ADMIN — Medication 10 UNIT(S): at 17:29

## 2019-01-01 RX ADMIN — Medication 3 MILLILITER(S): at 20:50

## 2019-01-01 RX ADMIN — APIXABAN 10 MILLIGRAM(S): 2.5 TABLET, FILM COATED ORAL at 18:44

## 2019-01-01 RX ADMIN — LISINOPRIL 40 MILLIGRAM(S): 2.5 TABLET ORAL at 06:35

## 2019-01-01 RX ADMIN — Medication 60 MILLIGRAM(S): at 13:09

## 2019-01-01 RX ADMIN — Medication 0.2 MILLIGRAM(S): at 15:03

## 2019-01-01 RX ADMIN — Medication 25 MILLIGRAM(S): at 04:02

## 2019-01-01 RX ADMIN — Medication 40 MILLIEQUIVALENT(S): at 21:26

## 2019-01-01 RX ADMIN — LISINOPRIL 40 MILLIGRAM(S): 2.5 TABLET ORAL at 05:45

## 2019-01-01 RX ADMIN — Medication 60 MILLIGRAM(S): at 06:35

## 2019-01-01 RX ADMIN — LISINOPRIL 40 MILLIGRAM(S): 2.5 TABLET ORAL at 05:27

## 2019-01-01 RX ADMIN — Medication 15 UNIT(S): at 12:25

## 2019-01-01 RX ADMIN — ALBUTEROL 1 PUFF(S): 90 AEROSOL, METERED ORAL at 22:58

## 2019-01-01 RX ADMIN — Medication 8: at 11:49

## 2019-01-01 RX ADMIN — Medication 0.2 MILLIGRAM(S): at 05:26

## 2019-01-01 RX ADMIN — BUDESONIDE AND FORMOTEROL FUMARATE DIHYDRATE 2 PUFF(S): 160; 4.5 AEROSOL RESPIRATORY (INHALATION) at 22:04

## 2019-01-01 RX ADMIN — Medication 100 MILLIGRAM(S): at 21:42

## 2019-01-01 RX ADMIN — OXYCODONE AND ACETAMINOPHEN 1 TABLET(S): 5; 325 TABLET ORAL at 16:59

## 2019-01-01 RX ADMIN — Medication 40 MILLIGRAM(S): at 21:27

## 2019-01-01 RX ADMIN — Medication 100 MILLIGRAM(S): at 05:26

## 2019-01-01 RX ADMIN — Medication 3 MILLILITER(S): at 16:13

## 2019-01-01 RX ADMIN — BUDESONIDE AND FORMOTEROL FUMARATE DIHYDRATE 2 PUFF(S): 160; 4.5 AEROSOL RESPIRATORY (INHALATION) at 21:40

## 2019-01-01 RX ADMIN — Medication 12 UNIT(S): at 12:12

## 2019-01-01 RX ADMIN — Medication 40 MILLIGRAM(S): at 05:10

## 2019-01-01 RX ADMIN — BUDESONIDE AND FORMOTEROL FUMARATE DIHYDRATE 2 PUFF(S): 160; 4.5 AEROSOL RESPIRATORY (INHALATION) at 22:05

## 2019-01-01 RX ADMIN — Medication 10 UNIT(S): at 08:25

## 2019-01-01 RX ADMIN — Medication 3 MILLILITER(S): at 00:23

## 2019-01-01 RX ADMIN — Medication 3 MILLILITER(S): at 14:51

## 2019-01-01 RX ADMIN — INSULIN GLARGINE 30 UNIT(S): 100 INJECTION, SOLUTION SUBCUTANEOUS at 22:11

## 2019-01-01 RX ADMIN — Medication 15 UNIT(S): at 17:26

## 2019-01-01 RX ADMIN — Medication 0.2 MILLIGRAM(S): at 21:29

## 2019-01-01 RX ADMIN — OXYCODONE AND ACETAMINOPHEN 1 TABLET(S): 5; 325 TABLET ORAL at 17:26

## 2019-01-01 RX ADMIN — Medication 2: at 12:25

## 2019-01-01 RX ADMIN — Medication 25 MILLIGRAM(S): at 05:36

## 2019-01-01 RX ADMIN — Medication 0.2 MILLIGRAM(S): at 04:02

## 2019-01-01 RX ADMIN — Medication 3 MILLILITER(S): at 13:27

## 2019-01-01 RX ADMIN — Medication 100 MILLIGRAM(S): at 05:37

## 2019-01-01 RX ADMIN — PANTOPRAZOLE SODIUM 40 MILLIGRAM(S): 20 TABLET, DELAYED RELEASE ORAL at 05:33

## 2019-01-01 RX ADMIN — APIXABAN 10 MILLIGRAM(S): 2.5 TABLET, FILM COATED ORAL at 17:16

## 2019-01-01 RX ADMIN — BUDESONIDE AND FORMOTEROL FUMARATE DIHYDRATE 2 PUFF(S): 160; 4.5 AEROSOL RESPIRATORY (INHALATION) at 19:24

## 2019-01-01 RX ADMIN — Medication 3 MILLILITER(S): at 05:54

## 2019-01-01 RX ADMIN — INSULIN GLARGINE 30 UNIT(S): 100 INJECTION, SOLUTION SUBCUTANEOUS at 21:27

## 2019-01-01 RX ADMIN — BUDESONIDE AND FORMOTEROL FUMARATE DIHYDRATE 2 PUFF(S): 160; 4.5 AEROSOL RESPIRATORY (INHALATION) at 08:32

## 2019-01-01 RX ADMIN — Medication 100 MILLIGRAM(S): at 15:52

## 2019-01-01 RX ADMIN — AMLODIPINE BESYLATE 10 MILLIGRAM(S): 2.5 TABLET ORAL at 05:09

## 2019-01-01 RX ADMIN — OXYCODONE AND ACETAMINOPHEN 1 TABLET(S): 5; 325 TABLET ORAL at 18:14

## 2019-01-01 RX ADMIN — APIXABAN 10 MILLIGRAM(S): 2.5 TABLET, FILM COATED ORAL at 06:35

## 2019-01-01 RX ADMIN — Medication 40 MILLIGRAM(S): at 13:05

## 2019-01-01 RX ADMIN — ALBUTEROL 2 PUFF(S): 90 AEROSOL, METERED ORAL at 15:26

## 2019-01-01 RX ADMIN — CHLORHEXIDINE GLUCONATE 1 APPLICATION(S): 213 SOLUTION TOPICAL at 05:10

## 2019-01-01 RX ADMIN — Medication 15 UNIT(S): at 17:08

## 2019-01-01 RX ADMIN — Medication 15 UNIT(S): at 08:19

## 2019-01-01 RX ADMIN — OXYCODONE AND ACETAMINOPHEN 1 TABLET(S): 5; 325 TABLET ORAL at 16:41

## 2019-01-01 RX ADMIN — Medication 25 MILLIGRAM(S): at 17:28

## 2019-01-01 RX ADMIN — INSULIN GLARGINE 30 UNIT(S): 100 INJECTION, SOLUTION SUBCUTANEOUS at 08:14

## 2019-01-01 RX ADMIN — Medication 40 MILLIGRAM(S): at 17:39

## 2019-01-01 RX ADMIN — Medication 60 MILLIGRAM(S): at 22:03

## 2019-01-01 RX ADMIN — Medication 100 MILLIGRAM(S): at 21:14

## 2019-01-01 RX ADMIN — Medication 20 MILLIGRAM(S): at 06:35

## 2019-01-01 RX ADMIN — Medication 3 MILLILITER(S): at 19:08

## 2019-01-01 RX ADMIN — INSULIN GLARGINE 30 UNIT(S): 100 INJECTION, SOLUTION SUBCUTANEOUS at 08:20

## 2019-01-01 RX ADMIN — INSULIN GLARGINE 30 UNIT(S): 100 INJECTION, SOLUTION SUBCUTANEOUS at 22:05

## 2019-01-01 RX ADMIN — AMLODIPINE BESYLATE 10 MILLIGRAM(S): 2.5 TABLET ORAL at 06:35

## 2019-01-01 RX ADMIN — Medication 20 MILLIGRAM(S): at 17:28

## 2019-01-01 RX ADMIN — OXYCODONE AND ACETAMINOPHEN 1 TABLET(S): 5; 325 TABLET ORAL at 21:39

## 2019-01-01 RX ADMIN — Medication 100 MILLIGRAM(S): at 14:19

## 2019-01-01 RX ADMIN — Medication 100 MILLIGRAM(S): at 21:32

## 2019-01-01 RX ADMIN — BUDESONIDE AND FORMOTEROL FUMARATE DIHYDRATE 2 PUFF(S): 160; 4.5 AEROSOL RESPIRATORY (INHALATION) at 09:16

## 2019-01-01 RX ADMIN — Medication 15 UNIT(S): at 08:15

## 2019-01-01 RX ADMIN — Medication 6: at 09:09

## 2019-01-01 RX ADMIN — Medication 10 UNIT(S): at 16:40

## 2019-01-01 RX ADMIN — TIOTROPIUM BROMIDE 1 CAPSULE(S): 18 CAPSULE ORAL; RESPIRATORY (INHALATION) at 20:18

## 2019-01-01 RX ADMIN — RIVAROXABAN 15 MILLIGRAM(S): KIT at 08:25

## 2019-01-01 RX ADMIN — Medication 2: at 08:46

## 2019-01-01 RX ADMIN — Medication 15 UNIT(S): at 08:26

## 2019-01-01 RX ADMIN — Medication 0.2 MILLIGRAM(S): at 05:09

## 2019-01-01 RX ADMIN — Medication 3 MILLILITER(S): at 08:03

## 2019-01-01 RX ADMIN — INSULIN GLARGINE 30 UNIT(S): 100 INJECTION, SOLUTION SUBCUTANEOUS at 21:13

## 2019-01-01 RX ADMIN — Medication 81 MILLIGRAM(S): at 12:05

## 2019-01-01 RX ADMIN — Medication 3 MILLILITER(S): at 07:23

## 2019-01-01 RX ADMIN — Medication 40 MILLIGRAM(S): at 05:37

## 2019-01-01 RX ADMIN — LISINOPRIL 40 MILLIGRAM(S): 2.5 TABLET ORAL at 09:16

## 2019-01-01 RX ADMIN — BUDESONIDE AND FORMOTEROL FUMARATE DIHYDRATE 2 PUFF(S): 160; 4.5 AEROSOL RESPIRATORY (INHALATION) at 08:16

## 2019-01-01 RX ADMIN — Medication 50 MILLIEQUIVALENT(S): at 13:04

## 2019-01-01 RX ADMIN — Medication 0.2 MILLIGRAM(S): at 15:23

## 2019-01-01 RX ADMIN — Medication 40 MILLIGRAM(S): at 22:03

## 2019-01-01 RX ADMIN — Medication 3 MILLILITER(S): at 20:21

## 2019-01-01 RX ADMIN — HEPARIN SODIUM 10000 UNIT(S): 5000 INJECTION INTRAVENOUS; SUBCUTANEOUS at 02:23

## 2019-01-01 RX ADMIN — Medication 20 MILLIGRAM(S): at 05:37

## 2019-01-01 RX ADMIN — Medication 81 MILLIGRAM(S): at 11:15

## 2019-01-01 RX ADMIN — HEPARIN SODIUM 2400 UNIT(S)/HR: 5000 INJECTION INTRAVENOUS; SUBCUTANEOUS at 02:28

## 2019-01-01 RX ADMIN — AMLODIPINE BESYLATE 10 MILLIGRAM(S): 2.5 TABLET ORAL at 05:38

## 2019-01-01 RX ADMIN — Medication 0.2 MILLIGRAM(S): at 06:35

## 2019-01-01 RX ADMIN — Medication 100 MILLIGRAM(S): at 05:44

## 2019-01-01 RX ADMIN — LISINOPRIL 40 MILLIGRAM(S): 2.5 TABLET ORAL at 05:26

## 2019-01-01 RX ADMIN — Medication 3 MILLILITER(S): at 20:18

## 2019-01-01 RX ADMIN — Medication 100 MILLIGRAM(S): at 13:57

## 2019-01-01 RX ADMIN — Medication 15 UNIT(S): at 12:14

## 2019-01-01 RX ADMIN — Medication 0.2 MILLIGRAM(S): at 22:05

## 2019-01-01 RX ADMIN — Medication 0.2 MILLIGRAM(S): at 14:19

## 2019-01-01 RX ADMIN — BUDESONIDE AND FORMOTEROL FUMARATE DIHYDRATE 2 PUFF(S): 160; 4.5 AEROSOL RESPIRATORY (INHALATION) at 08:00

## 2019-01-01 RX ADMIN — Medication 3 MILLILITER(S): at 15:40

## 2019-01-01 RX ADMIN — Medication 40 MILLIGRAM(S): at 05:33

## 2019-01-01 RX ADMIN — Medication 3: at 08:25

## 2019-01-01 RX ADMIN — Medication 0.2 MILLIGRAM(S): at 05:33

## 2019-01-01 RX ADMIN — Medication 2: at 12:14

## 2019-01-01 RX ADMIN — LISINOPRIL 40 MILLIGRAM(S): 2.5 TABLET ORAL at 05:33

## 2019-01-01 RX ADMIN — Medication 0.2 MILLIGRAM(S): at 21:25

## 2019-01-01 RX ADMIN — BUDESONIDE AND FORMOTEROL FUMARATE DIHYDRATE 2 PUFF(S): 160; 4.5 AEROSOL RESPIRATORY (INHALATION) at 08:47

## 2019-01-01 RX ADMIN — Medication 25 MILLIGRAM(S): at 17:39

## 2019-01-01 RX ADMIN — Medication 3 MILLILITER(S): at 12:25

## 2019-01-01 RX ADMIN — APIXABAN 10 MILLIGRAM(S): 2.5 TABLET, FILM COATED ORAL at 05:33

## 2019-01-01 RX ADMIN — BUDESONIDE AND FORMOTEROL FUMARATE DIHYDRATE 2 PUFF(S): 160; 4.5 AEROSOL RESPIRATORY (INHALATION) at 08:20

## 2019-01-01 RX ADMIN — Medication 3 MILLILITER(S): at 07:44

## 2019-01-01 RX ADMIN — Medication 10 MILLIGRAM(S): at 05:10

## 2019-01-01 RX ADMIN — OXYCODONE AND ACETAMINOPHEN 1 TABLET(S): 5; 325 TABLET ORAL at 22:09

## 2019-01-01 RX ADMIN — Medication 40 MILLIGRAM(S): at 05:26

## 2019-01-01 RX ADMIN — Medication 0.2 MILLIGRAM(S): at 21:32

## 2019-01-01 RX ADMIN — Medication 40 MILLIGRAM(S): at 05:27

## 2019-01-01 RX ADMIN — RIVAROXABAN 15 MILLIGRAM(S): KIT at 16:48

## 2019-01-01 RX ADMIN — Medication 4: at 12:03

## 2019-01-01 RX ADMIN — Medication 2: at 08:19

## 2019-01-01 RX ADMIN — INFLUENZA VIRUS VACCINE 0.5 MILLILITER(S): 15; 15; 15; 15 SUSPENSION INTRAMUSCULAR at 17:06

## 2019-01-01 RX ADMIN — Medication 10 UNIT(S): at 08:36

## 2019-01-01 RX ADMIN — Medication 0.2 MILLIGRAM(S): at 21:27

## 2019-01-01 RX ADMIN — Medication 81 MILLIGRAM(S): at 11:42

## 2019-01-01 RX ADMIN — Medication 100 MILLIGRAM(S): at 14:15

## 2019-01-01 RX ADMIN — AMLODIPINE BESYLATE 10 MILLIGRAM(S): 2.5 TABLET ORAL at 05:33

## 2019-01-01 RX ADMIN — Medication 60 MILLIGRAM(S): at 21:46

## 2019-01-01 RX ADMIN — Medication 50 MILLIGRAM(S): at 05:33

## 2019-01-01 RX ADMIN — Medication 25 MILLIGRAM(S): at 06:35

## 2019-01-01 RX ADMIN — Medication 3 MILLILITER(S): at 16:29

## 2019-01-01 RX ADMIN — Medication 81 MILLIGRAM(S): at 12:14

## 2019-01-01 RX ADMIN — Medication 25 MILLIGRAM(S): at 13:09

## 2019-01-01 RX ADMIN — Medication 0.2 MILLIGRAM(S): at 13:07

## 2019-01-01 RX ADMIN — Medication 2: at 17:41

## 2019-01-01 RX ADMIN — APIXABAN 10 MILLIGRAM(S): 2.5 TABLET, FILM COATED ORAL at 17:12

## 2019-01-01 RX ADMIN — Medication 25 MILLIGRAM(S): at 22:03

## 2019-01-01 RX ADMIN — Medication 40 MILLIGRAM(S): at 17:16

## 2019-01-01 RX ADMIN — INSULIN GLARGINE 30 UNIT(S): 100 INJECTION, SOLUTION SUBCUTANEOUS at 08:38

## 2019-01-01 RX ADMIN — OXYCODONE AND ACETAMINOPHEN 1 TABLET(S): 5; 325 TABLET ORAL at 16:48

## 2019-01-01 RX ADMIN — INSULIN GLARGINE 30 UNIT(S): 100 INJECTION, SOLUTION SUBCUTANEOUS at 09:55

## 2019-01-01 RX ADMIN — Medication 25 MILLIGRAM(S): at 06:04

## 2019-01-01 RX ADMIN — APIXABAN 10 MILLIGRAM(S): 2.5 TABLET, FILM COATED ORAL at 05:09

## 2019-01-01 RX ADMIN — Medication 25 MILLIGRAM(S): at 21:27

## 2019-01-01 RX ADMIN — Medication 3 MILLILITER(S): at 03:24

## 2019-01-01 RX ADMIN — Medication 20 MILLIGRAM(S): at 06:04

## 2019-01-01 RX ADMIN — Medication 25 MILLIGRAM(S): at 05:26

## 2019-01-01 RX ADMIN — Medication 6: at 16:40

## 2019-01-01 RX ADMIN — INSULIN GLARGINE 30 UNIT(S): 100 INJECTION, SOLUTION SUBCUTANEOUS at 08:27

## 2019-01-01 RX ADMIN — Medication 3 MILLILITER(S): at 07:53

## 2019-01-01 RX ADMIN — Medication 25 MILLIGRAM(S): at 19:24

## 2019-01-01 RX ADMIN — Medication 0.2 MILLIGRAM(S): at 14:00

## 2019-01-01 RX ADMIN — Medication 2: at 17:04

## 2019-01-01 RX ADMIN — Medication 40 MILLIGRAM(S): at 19:24

## 2019-01-01 RX ADMIN — Medication 4: at 17:15

## 2019-01-01 RX ADMIN — Medication 3 MILLILITER(S): at 13:21

## 2019-01-01 RX ADMIN — Medication 15 UNIT(S): at 11:26

## 2019-01-01 RX ADMIN — PANTOPRAZOLE SODIUM 40 MILLIGRAM(S): 20 TABLET, DELAYED RELEASE ORAL at 05:45

## 2019-01-01 RX ADMIN — Medication 25 MILLIGRAM(S): at 05:09

## 2019-01-01 RX ADMIN — Medication 25 MILLIGRAM(S): at 13:06

## 2019-01-01 RX ADMIN — BUDESONIDE AND FORMOTEROL FUMARATE DIHYDRATE 2 PUFF(S): 160; 4.5 AEROSOL RESPIRATORY (INHALATION) at 21:16

## 2019-01-01 RX ADMIN — Medication 81 MILLIGRAM(S): at 12:26

## 2019-01-01 RX ADMIN — AMLODIPINE BESYLATE 10 MILLIGRAM(S): 2.5 TABLET ORAL at 04:02

## 2019-01-01 RX ADMIN — AMLODIPINE BESYLATE 10 MILLIGRAM(S): 2.5 TABLET ORAL at 05:27

## 2019-01-01 RX ADMIN — Medication 25 MILLIGRAM(S): at 17:24

## 2019-01-01 RX ADMIN — Medication 100 MILLIGRAM(S): at 05:30

## 2019-01-01 RX ADMIN — BUDESONIDE AND FORMOTEROL FUMARATE DIHYDRATE 2 PUFF(S): 160; 4.5 AEROSOL RESPIRATORY (INHALATION) at 08:37

## 2019-01-01 RX ADMIN — Medication 2: at 12:35

## 2019-01-01 RX ADMIN — Medication 40 MILLIGRAM(S): at 17:13

## 2019-01-01 RX ADMIN — APIXABAN 10 MILLIGRAM(S): 2.5 TABLET, FILM COATED ORAL at 05:45

## 2019-01-01 RX ADMIN — LISINOPRIL 40 MILLIGRAM(S): 2.5 TABLET ORAL at 05:09

## 2019-01-01 RX ADMIN — Medication 15 UNIT(S): at 17:04

## 2019-01-01 RX ADMIN — Medication 100 MILLIGRAM(S): at 15:03

## 2019-01-01 RX ADMIN — APIXABAN 10 MILLIGRAM(S): 2.5 TABLET, FILM COATED ORAL at 05:29

## 2019-01-01 RX ADMIN — APIXABAN 10 MILLIGRAM(S): 2.5 TABLET, FILM COATED ORAL at 05:37

## 2019-01-01 RX ADMIN — BUDESONIDE AND FORMOTEROL FUMARATE DIHYDRATE 2 PUFF(S): 160; 4.5 AEROSOL RESPIRATORY (INHALATION) at 08:36

## 2019-01-01 RX ADMIN — INSULIN GLARGINE 30 UNIT(S): 100 INJECTION, SOLUTION SUBCUTANEOUS at 11:41

## 2019-01-01 RX ADMIN — Medication 25 MILLIGRAM(S): at 21:25

## 2019-01-01 RX ADMIN — Medication 60 MILLIGRAM(S): at 05:30

## 2019-01-01 RX ADMIN — Medication 0.2 MILLIGRAM(S): at 21:14

## 2019-01-01 RX ADMIN — OXYCODONE AND ACETAMINOPHEN 1 TABLET(S): 5; 325 TABLET ORAL at 16:29

## 2019-01-01 RX ADMIN — Medication 60 MILLIGRAM(S): at 03:24

## 2019-01-01 RX ADMIN — Medication 25 MILLIGRAM(S): at 17:00

## 2019-01-01 RX ADMIN — Medication 10 UNIT(S): at 12:30

## 2019-01-01 RX ADMIN — Medication 40 MILLIGRAM(S): at 05:29

## 2019-01-01 RX ADMIN — Medication 10 UNIT(S): at 09:09

## 2019-01-01 RX ADMIN — Medication 81 MILLIGRAM(S): at 12:30

## 2019-01-01 RX ADMIN — Medication 0.2 MILLIGRAM(S): at 21:45

## 2019-01-01 RX ADMIN — INSULIN GLARGINE 30 UNIT(S): 100 INJECTION, SOLUTION SUBCUTANEOUS at 21:30

## 2019-01-01 RX ADMIN — INSULIN GLARGINE 30 UNIT(S): 100 INJECTION, SOLUTION SUBCUTANEOUS at 23:02

## 2019-01-01 RX ADMIN — OXYCODONE AND ACETAMINOPHEN 1 TABLET(S): 5; 325 TABLET ORAL at 16:39

## 2019-01-01 RX ADMIN — Medication 25 MILLIGRAM(S): at 17:17

## 2019-01-01 RX ADMIN — Medication 15 UNIT(S): at 17:15

## 2019-01-01 RX ADMIN — BUDESONIDE AND FORMOTEROL FUMARATE DIHYDRATE 2 PUFF(S): 160; 4.5 AEROSOL RESPIRATORY (INHALATION) at 09:56

## 2019-01-01 RX ADMIN — Medication 100 MILLIGRAM(S): at 15:14

## 2019-01-01 RX ADMIN — OXYCODONE AND ACETAMINOPHEN 1 TABLET(S): 5; 325 TABLET ORAL at 21:32

## 2019-01-01 RX ADMIN — Medication 40 MILLIGRAM(S): at 17:24

## 2019-01-01 RX ADMIN — BUDESONIDE AND FORMOTEROL FUMARATE DIHYDRATE 2 PUFF(S): 160; 4.5 AEROSOL RESPIRATORY (INHALATION) at 08:14

## 2019-01-01 RX ADMIN — Medication 81 MILLIGRAM(S): at 12:11

## 2019-01-01 RX ADMIN — Medication 60 MILLIGRAM(S): at 12:17

## 2019-01-01 RX ADMIN — Medication 60 MILLIGRAM(S): at 17:45

## 2019-01-01 RX ADMIN — Medication 3 MILLILITER(S): at 20:16

## 2019-01-01 RX ADMIN — Medication 10 UNIT(S): at 12:34

## 2019-01-01 RX ADMIN — APIXABAN 10 MILLIGRAM(S): 2.5 TABLET, FILM COATED ORAL at 17:01

## 2019-01-01 RX ADMIN — LISINOPRIL 40 MILLIGRAM(S): 2.5 TABLET ORAL at 05:37

## 2019-01-01 RX ADMIN — Medication 0.2 MILLIGRAM(S): at 13:09

## 2019-01-01 RX ADMIN — Medication 100 MILLIGRAM(S): at 05:09

## 2019-01-01 RX ADMIN — Medication 100 MILLIGRAM(S): at 22:03

## 2019-01-01 RX ADMIN — Medication 100 MILLIGRAM(S): at 21:29

## 2019-01-01 RX ADMIN — Medication 81 MILLIGRAM(S): at 11:13

## 2019-01-01 RX ADMIN — BUDESONIDE AND FORMOTEROL FUMARATE DIHYDRATE 2 PUFF(S): 160; 4.5 AEROSOL RESPIRATORY (INHALATION) at 21:25

## 2019-01-01 RX ADMIN — Medication 10 UNIT(S): at 11:49

## 2019-01-01 RX ADMIN — Medication 2: at 08:15

## 2019-01-01 RX ADMIN — Medication 0.2 MILLIGRAM(S): at 05:37

## 2019-01-01 RX ADMIN — Medication 40 MILLIGRAM(S): at 18:43

## 2019-01-01 RX ADMIN — Medication 0.2 MILLIGRAM(S): at 14:15

## 2019-01-01 RX ADMIN — Medication 15 UNIT(S): at 18:44

## 2019-01-01 RX ADMIN — Medication 2: at 08:13

## 2019-06-20 NOTE — PHYSICAL EXAM
[General Appearance - Well Developed] : well developed [Normal Appearance] : normal appearance [General Appearance - Well Nourished] : well nourished [Well Groomed] : well groomed [General Appearance - In No Acute Distress] : no acute distress [Normal Conjunctiva] : the conjunctiva exhibited no abnormalities [No Deformities] : no deformities [Normal Oral Mucosa] : normal oral mucosa [No Oral Pallor] : no oral pallor [Eyelids - No Xanthelasma] : the eyelids demonstrated no xanthelasmas [No Oral Cyanosis] : no oral cyanosis [Normal Jugular Venous A Waves Present] : normal jugular venous A waves present [Normal Jugular Venous V Waves Present] : normal jugular venous V waves present [Heart Rate And Rhythm] : heart rate and rhythm were normal [No Jugular Venous Payne A Waves] : no jugular venous payne A waves [Heart Sounds] : normal S1 and S2 [Murmurs] : no murmurs present [Exaggerated Use Of Accessory Muscles For Inspiration] : no accessory muscle use [Respiration, Rhythm And Depth] : normal respiratory rhythm and effort [Auscultation Breath Sounds / Voice Sounds] : lungs were clear to auscultation bilaterally [Abdomen Soft] : soft [Abdomen Tenderness] : non-tender [Abnormal Walk] : normal gait [Abdomen Mass (___ Cm)] : no abdominal mass palpated [Gait - Sufficient For Exercise Testing] : the gait was sufficient for exercise testing [Cyanosis, Localized] : no localized cyanosis [Nail Clubbing] : no clubbing of the fingernails [Petechial Hemorrhages (___cm)] : no petechial hemorrhages [Skin Color & Pigmentation] : normal skin color and pigmentation [] : no rash [Skin Lesions] : no skin lesions [No Venous Stasis] : no venous stasis [No Xanthoma] : no  xanthoma was observed [No Skin Ulcers] : no skin ulcer [Oriented To Time, Place, And Person] : oriented to person, place, and time [Affect] : the affect was normal [Mood] : the mood was normal [No Anxiety] : not feeling anxious
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
## 2019-08-09 NOTE — ASSESSMENT
[FreeTextEntry1] : Plan\par peripheral edema of b/l extremities secondary to medication;\par -stop amlodipine\par -increase hydrazine 50mg twice a day\par toxic encephalopathy\par -home sleep study\par -MRI of the brain w/o contrast\par neuropathy:\par -B12 labs, ESR, CMP, testosterone/free, cortisol (am labs)\par -gabapentin 200mg in the morning and 200mg in the afternoon; duloxetine 30mg in the morning

## 2019-08-09 NOTE — PHYSICAL EXAM
[Neck Appearance] : the appearance of the neck was normal [Respiration, Rhythm And Depth] : normal respiratory rhythm and effort [] : no respiratory distress [Auscultation Breath Sounds / Voice Sounds] : lungs were clear to auscultation bilaterally [Heart Rate And Rhythm] : heart rate was normal and rhythm regular [Heart Sounds] : normal S1 and S2 [Arterial Pulses Carotid] : carotid pulses were normal with no bruits [FreeTextEntry1] : ++edema b/l legs

## 2019-08-26 PROBLEM — M54.30 SCIATICA: Status: ACTIVE | Noted: 2019-01-01

## 2019-08-26 PROBLEM — G92 TOXIC ENCEPHALOPATHY: Status: ACTIVE | Noted: 2019-01-01

## 2019-08-26 PROBLEM — G62.9 NEUROPATHY: Status: ACTIVE | Noted: 2019-01-01

## 2019-09-12 NOTE — ED PROVIDER NOTE - PROGRESS NOTE DETAILS
Luz: Acknowledged signout from Frank Saldana amy - signed out pt to Dr. Escobar. Repeating troponin (1st was hemolyzed). EKG unchanged from priors. No c/o chest pain. +left popliteal DVT prelim read Pt evaluated by me. Presented with 1 day of worsened RLE redness and swelling. Found to have popliteal DVT in ED. Will be started on Lovenox. Pending CT chest, repeat trop (initial hemolyzed specimen 0.15) and plan is admission. Ng: PT examined by me, pending CTA CT scan results spoken with Radiologist, will anticoagulate with heparin, ICU contacted. will admit to ICU will admit to ICU, No RV strain on bedtside POC echo ICU fellow consulted as per PERT protocol. Reports no IR intervention. Pt to be admitted to ICU. Pt never received Lovenox. Will start on heparin.

## 2019-09-12 NOTE — ED PROVIDER NOTE - CARE PLAN
Principal Discharge DX:	Pulmonary embolism Principal Discharge DX:	Chronic pulmonary embolism with acute cor pulmonale, unspecified pulmonary embolism type  Secondary Diagnosis:	Abnormal cardiac enzyme level

## 2019-09-12 NOTE — ED PROVIDER NOTE - CLINICAL SUMMARY MEDICAL DECISION MAKING FREE TEXT BOX
Pt evaluated by me. Presented with RLE pain, redness and swelling. Required labs, imaging, DVT study showed popliteal DVT and CTA showed b/l PE. Pt with elevated trop and BNP. Activated the PERT protocol. No need for IR intervention emergently. Pt to be admitted to ICU for further management.

## 2019-09-12 NOTE — ED PROVIDER NOTE - NS ED ROS FT
Eyes:  No visual changes, eye pain or discharge.  ENMT:  No hearing changes, pain, no sore throat or runny nose, no difficulty swallowing  Cardiac:  +sob +edema -CP  Respiratory:  +cough  GI:  No nausea, vomiting, diarrhea or abdominal pain.  :  No dysuria, frequency or burning.  MS:  B/L LE pain  Neuro:  No headache or weakness.  No LOC.  Skin:  R LE cellulitic changes  Endocrine: +t2d

## 2019-09-12 NOTE — ED PROVIDER NOTE - PHYSICAL EXAMINATION
CONSTITUTIONAL: Well-developed; well-nourished; in no acute distress.   SKIN: warm, dry  HEAD: Normocephalic; atraumatic.  EYES: no conjunctival injection.   ENT: No nasal discharge; airway clear.  NECK: Supple; non tender.  CARD: S1, S2 normal;   RESP: Crackles to bilateral lung bases  ABD: soft ntnd  EXT: Normal ROM. 3+edema to both lower extremities. Increased warmth, erythema over R lower extremity. No palpable cords.   NEURO: Alert, oriented, grossly unremarkable  PSYCH: Cooperative, appropriate.

## 2019-09-12 NOTE — ED ADULT NURSE NOTE - OBJECTIVE STATEMENT
pt presents to er complaining of b/l LE redness and swelling worse in the right leg. extremities are hot red and swollen. no pitting edema noted. pt has a history of diabetes, pt reports non compliance with insulin. pt denies chest pain, has chronic bronchitis, does not report worsening shortness of breath, no respiratory distress noted on assessment. pt reports pain with ambulation. pt reports A1c was drawn this week and was above 13.

## 2019-09-12 NOTE — PHARMACOTHERAPY INTERVENTION NOTE - COMMENTS
Spoke with MD and requested that patients weight be recorded in the chart to verify the lovenox dose.

## 2019-09-12 NOTE — ED PROVIDER NOTE - OBJECTIVE STATEMENT
59yr m hx HTN, chronic venous insufficiency, CAD s/p CABG, T2D, DLD presenting for worsening swelling of both of his legs x1 month. Hx of R ankle fracture in January. Since has had baseline swelling b/l LE. For the past day has had reddening of R LE, with increased warmth. Pain, and heaviness to both lower extremities. Pt is baseline SOB. Uses 3 pillows at night to sleep. Dashawn was recently dxed with bronchitis and is currently on abx. +productive cough. No f/c/n/v. No chest pain. No abdominal pain. No hx of DVT/PE.

## 2019-09-12 NOTE — ED ADULT TRIAGE NOTE - CHIEF COMPLAINT QUOTE
Pt c/o b/l leg swelling x 3 months. Pt also c./o right lower leg redness, swelling, and pain since last night. Pt denies SOB, CP.

## 2019-09-12 NOTE — ED PROVIDER NOTE - ATTENDING CONTRIBUTION TO CARE
I personally evaluated the patient. I reviewed the Resident’s or Physician Assistant’s note (as assigned above), and agree with the findings and plan except as documented in my note.    58yo M with PMHx CAD/CABG, HTN, DM, chronic venous insufficiency on lasix 40mg daily, presents with worsening leg swelling over 3 months and right leg pain and redness for past 1 week. No fever, purulent drainage. Has a small skin abrasion on left leg that looks like its "draining water." Pt states he broke his right ankle 7 months ago, since then legs have been more swollen in general. Denies chest pain, SOB, fever. Being treated for cough/bronchitis for past 3 days with levaquin, states he gets bronchitis frequently. Denies headache, lightheadedness, nausea, vomiting, abd pain, dysuria, hematuria.    Vital signs reviewed  GENERAL: Patient nontoxic appearing, NAD  HEAD: NCAT  EYES: Anicteric  ENT: MMM  RESPIRATORY: Normal respiratory effort. CTA B/L. No wheezing, rales, rhonchi  CARDIOVASCULAR: Regular rate and rhythm  ABDOMEN: Soft. Nondistended. Nontender. No guarding or rebound.   MUSCULOSKELETAL/EXTREMITIES: Brisk cap refill. 3+ pedal edema. No calf tenderness.  SKIN: Right leg mild erythema, not TTP. Skin of RLE is not hot to touch.   NEURO: AAOx3. No gross FND. I personally evaluated the patient. I reviewed the Resident’s or Physician Assistant’s note (as assigned above), and agree with the findings and plan except as documented in my note.    58yo M with PMHx CAD/CABG, HTN, DM, chronic venous insufficiency on lasix 40mg daily, presents with worsening leg swelling over 3 months and right leg pain and redness for past 1 week. No fever, purulent drainage. Has a small skin abrasion on left leg that looks like its "draining water." Pt states he broke his right ankle 7 months ago, since then legs have been more swollen in general. Denies chest pain, SOB, fever. Sleeps on 2-3 pillows at baseline, no worsening orthopnea. Being treated for cough/bronchitis for past 3 days with levaquin. Denies headache, lightheadedness, nausea, vomiting, abd pain, dysuria, hematuria.    Vital signs reviewed  GENERAL: Patient nontoxic appearing, NAD  HEAD: NCAT  EYES: Anicteric  ENT: MMM  RESPIRATORY: Normal respiratory effort. CTA B/L. No wheezing, rales, rhonchi  CARDIOVASCULAR: Regular rate and rhythm  ABDOMEN: Soft. Nondistended. Nontender. No guarding or rebound.   MUSCULOSKELETAL/EXTREMITIES: Brisk cap refill. 3+ pedal edema. No calf tenderness.  SKIN: Right leg mild erythema, not TTP. Skin of RLE is not hot to touch.   NEURO: AAOx3. No gross FND.    labs, cxr, ekg, venous duplex

## 2019-09-12 NOTE — ED PROVIDER NOTE - PRINCIPAL DIAGNOSIS
Pulmonary embolism Chronic pulmonary embolism with acute cor pulmonale, unspecified pulmonary embolism type

## 2019-09-13 NOTE — H&P ADULT - NSHPPHYSICALEXAM_GEN_ALL_CORE
PHYSICAL EXAM:    T(C): 35.9 (09-12-19 @ 23:30), Max: 36.5 (09-12-19 @ 15:15)  HR: 81 (09-13-19 @ 03:00) (76 - 82)  BP: 185/84 (09-13-19 @ 03:00) (175/79 - 185/84)  RR: 20 (09-13-19 @ 03:00) (18 - 20)  SpO2: 97% (09-13-19 @ 03:00) (96% - 97%)    HEENT: Neck supple, No oral lesions, no throat redness or swelling  Respiratory: Breath sounds  CTA b/l, no accessory muscle use, patient gets tired by end of sentences  Cardiovascular: regular rate and rhythm, normal S1 S2, no murmurs  Gastrointestinal: soft non-tender no hepatosplenomegaly, normal BS  Genitourinary: no lesions, no discharge  Extremities: positive peripheral pulses b/l LL edema and erythema  Neurological: AAO4 no focal motor deficit  Skin: no lesions or wounds  Musculoskeletal: no joint swelling or tenderness

## 2019-09-13 NOTE — H&P ADULT - NSICDXPASTMEDICALHX_GEN_ALL_CORE_FT
PAST MEDICAL HISTORY:  Arthritis ra hands knees    Asthma stable post 911    CAD (coronary artery disease) of bypass graft 2015 x 3 vd    COPD (chronic obstructive pulmonary disease) post 911    Diabetes     Hiatal hernia with GERD     HTN (hypertension)     Murmur     Myocardial infarction 2015    Obesity     Obstructive sleep apnea on CPAP     Sjogrens syndrome     SLE (systemic lupus erythematosus)     Thyroid nodule

## 2019-09-13 NOTE — H&P ADULT - ASSESSMENT
60 yo male PMHx HTN, CAD s/p CABG x3 2015, COPD, CHF? jameson's esophagus, reactive airway disease, DM2 presented for evaluation of leg swelling and pain and shortness of breath.    # Acute submassive b/l pulmonary embolism:  - Admit to MICU  - heparin gtt  - trop 0.15 BNP 5000  - no radiologic evidence of rt heart strain on CT  - f/u TTE, trend CE  - f/u PTT at 11 AM  - f/u [facility] report    # DM2:  - hold home meds  - insulin basal-bolus, FS    # HTN:  - continue with hydralazine, clonidine, amlodipine, lisinopril, metoprolol    # COPD :  - c/w nebs, inhalers, solumedrol then change to PO    # CAD s/p CABG:  - c/w ASA and statin  - no chest pain or EKG changes  - if chest pain EKG, trop, cardiac workup    # DVT ppx: on heparin gtt   DASH TLC CC diet  bed rest  Full code

## 2019-09-13 NOTE — CONSULT NOTE ADULT - SUBJECTIVE AND OBJECTIVE BOX
Patient is a 59y old  Male who presents with a chief complaint of pulmonary embolism (13 Sep 2019 03:38)      HPI:  60 yo male PMHx HTN, CAD s/p CABG x3 2015, COPD, jameson's esophagus, reactive airway disease, DM2 presented for evaluation of leg swelling and pain and shortness of breath. He reports that he's had chronic leg swelling which got worse a week ago, and yesterday was prescribed PO steroid taper by his Dr for SOB. He had an ankle fracture since March and was in a cast walked using crutches, with decreased ambulation and prolonged recumbant durations. He denies chest pain, cough, palpitation, abdominal pain, n/v/d.    In ED EKG LL dupplex showed DVTs (pending official read), lab work showed positive troponin 0.15 and elevated BNP 5000. CT abdomen/pelvis showed b/l acute segmental and subsegmental PE and right main PA pulmonary emboli. O2 97% RA (13 Sep 2019 03:38)      PAST MEDICAL & SURGICAL HISTORY:  SLE (systemic lupus erythematosus)  Thyroid nodule  Sjogrens syndrome  Obesity  Hiatal hernia with GERD  Murmur  COPD (chronic obstructive pulmonary disease): post 911  Asthma: stable post 911  Obstructive sleep apnea on CPAP  Diabetes  HTN (hypertension)  Myocardial infarction: 2015  CAD (coronary artery disease) of bypass graft: 2015 x 3 vd  Arthritis: ra hands knees  History of surgery: 2015 x 3 v  History of surgery: 1990- rct sx      SOCIAL HX:   Nonsmoker    FAMILY HISTORY:  Diabetes (Father, Mother)  CAD (coronary artery disease) (Father, Mother)  :  No known cardiovacular family hisotry     ROS:  See HPI     Allergies    Mushrooms (Other (Severe))  No Known Drug Allergies      PHYSICAL EXAM    ICU Vital Signs Last 24 Hrs  T(C): 37 (13 Sep 2019 08:00), Max: 37 (13 Sep 2019 08:00)  T(F): 98.6 (13 Sep 2019 08:00), Max: 98.6 (13 Sep 2019 08:00)  HR: 84 (13 Sep 2019 09:17) (73 - 88)  BP: 171/78 (13 Sep 2019 09:17) (156/73 - 185/84)  BP(mean): 115 (13 Sep 2019 08:00) (115 - 115)  RR: 21 (13 Sep 2019 09:17) (16 - 25)  SpO2: 93% (13 Sep 2019 09:17) (93% - 97%)      General: In NAD   HEENT:  BETITO  Lymphatic system: No cervical LN   Lungs: Bilateral BS clear  Cardiovascular: Regular. B/L LE pitting edema +2  Gastrointestinal: Soft, Positive BS  Musculoskeletal: No clubbing.  Moves all extremities.  Full range of motion  Skin: Warm.  Intact. Right leg red and warm  Neurological: No motor or sensory deficit       LABS:                          15.2   12.14 )-----------( 211      ( 13 Sep 2019 06:35 )             45.8                                               09-13    135  |  92<L>  |  23<H>  ----------------------------<  221<H>  3.1<L>   |  25  |  1.0    Ca    8.6      13 Sep 2019 06:35  Phos  2.7     09-13  Mg     2.3     09-13    TPro  7.4  /  Alb  3.8  /  TBili  1.1  /  DBili  x   /  AST  73<H>  /  ALT  76<H>  /  AlkPhos  67  09-12      PT/INR - ( 13 Sep 2019 02:00 )   PT: 10.70 sec;   INR: 0.93 ratio         PTT - ( 13 Sep 2019 02:00 )  PTT:30.8 sec                                           CARDIAC MARKERS ( 13 Sep 2019 06:35 )  x     / 0.15 ng/mL / x     / x     / x      CARDIAC MARKERS ( 12 Sep 2019 21:04 )  x     / 0.15 ng/mL / x     / x     / x      CARDIAC MARKERS ( 12 Sep 2019 18:48 )  x     / 0.15 ng/mL / x     / x     / x                                                LIVER FUNCTIONS - ( 12 Sep 2019 18:48 )  Alb: 3.8 g/dL / Pro: 7.4 g/dL / ALK PHOS: 67 U/L / ALT: 76 U/L / AST: 73 U/L / GGT: x                                               MEDICATIONS  (STANDING):  ALBUTerol/ipratropium for Nebulization 3 milliLiter(s) Nebulizer every 4 hours  amLODIPine   Tablet 10 milliGRAM(s) Oral daily  aspirin enteric coated 81 milliGRAM(s) Oral daily  buDESOnide 160 MICROgram(s)/formoterol 4.5 MICROgram(s) Inhaler 2 Puff(s) Inhalation two times a day  chlorhexidine 4% Liquid 1 Application(s) Topical <User Schedule>  cloNIDine 0.2 milliGRAM(s) Oral three times a day  dextrose 5%. 1000 milliLiter(s) (50 mL/Hr) IV Continuous <Continuous>  dextrose 50% Injectable 12.5 Gram(s) IV Push once  dextrose 50% Injectable 25 Gram(s) IV Push once  dextrose 50% Injectable 25 Gram(s) IV Push once  furosemide    Tablet 20 milliGRAM(s) Oral two times a day  heparin  Infusion.  Unit(s)/Hr (24 mL/Hr) IV Continuous <Continuous>  hydrALAZINE 25 milliGRAM(s) Oral three times a day  influenza   Vaccine 0.5 milliLiter(s) IntraMuscular once  insulin glargine Injectable (LANTUS) 30 Unit(s) SubCutaneous at bedtime  insulin lispro (HumaLOG) corrective regimen sliding scale   SubCutaneous three times a day before meals  insulin lispro Injectable (HumaLOG) 10 Unit(s) SubCutaneous three times a day before meals  lisinopril 40 milliGRAM(s) Oral daily  methylPREDNISolone sodium succinate Injectable 60 milliGRAM(s) IV Push three times a day  metoprolol tartrate 25 milliGRAM(s) Oral two times a day    MEDICATIONS  (PRN):  ALBUTerol/ipratropium for Nebulization 3 milliLiter(s) Nebulizer every 4 hours PRN Shortness of Breath and/or Wheezing  dextrose 40% Gel 15 Gram(s) Oral once PRN Blood Glucose LESS THAN 70 milliGRAM(s)/deciliter  glucagon  Injectable 1 milliGRAM(s) IntraMuscular once PRN Glucose LESS THAN 70 milligrams/deciliter  oxyCODONE    5 mG/acetaminophen 325 mG 1 Tablet(s) Oral every 6 hours PRN Severe Pain (7 - 10)

## 2019-09-13 NOTE — PROGRESS NOTE ADULT - SUBJECTIVE AND OBJECTIVE BOX
SUBJECTIVE:    Patient is a 59y old Male who presented with SOB and bilateral LE edema worse than usual. Pt has a hx of HTN, CAD s/p CABG x3, COPD, jameson's esophagus, reactive airway disease, DM2. Had an ankle injury in March and has been ambulating less since then, but is still ambulating with cane.   CTA showed bilateral PE and LL duplex showed DVTs.   Today is hospital day 1. This morning he is resting comfortably in bed and reports no issues. Is not in respiratory distress, can speak normally, and is eating breakfast.     PAST MEDICAL & SURGICAL HISTORY  SLE (systemic lupus erythematosus)  Thyroid nodule  Sjogrens syndrome  Obesity  Hiatal hernia with GERD  Murmur  COPD (chronic obstructive pulmonary disease): post 911  Asthma: stable post 911  Obstructive sleep apnea on CPAP  Diabetes  HTN (hypertension)  Myocardial infarction: 2015  CAD (coronary artery disease) of bypass graft: 2015 x 3 vd  Arthritis: ra hands knees  History of surgery: 2015 x 3 v  History of surgery: 1990- rct sx    SOCIAL HISTORY:  Former smoker >40 years ago     ALLERGIES:  Mushrooms (Other (Severe))  No Known Drug Allergies    MEDICATIONS:  STANDING MEDICATIONS  ALBUTerol/ipratropium for Nebulization 3 milliLiter(s) Nebulizer every 4 hours  amLODIPine   Tablet 10 milliGRAM(s) Oral daily  apixaban 10 milliGRAM(s) Oral every 12 hours  aspirin enteric coated 81 milliGRAM(s) Oral daily  buDESOnide 160 MICROgram(s)/formoterol 4.5 MICROgram(s) Inhaler 2 Puff(s) Inhalation two times a day  chlorhexidine 4% Liquid 1 Application(s) Topical <User Schedule>  cloNIDine 0.2 milliGRAM(s) Oral three times a day  dextrose 5%. 1000 milliLiter(s) IV Continuous <Continuous>  dextrose 50% Injectable 12.5 Gram(s) IV Push once  dextrose 50% Injectable 25 Gram(s) IV Push once  dextrose 50% Injectable 25 Gram(s) IV Push once  furosemide    Tablet 20 milliGRAM(s) Oral two times a day  hydrALAZINE 25 milliGRAM(s) Oral three times a day  influenza   Vaccine 0.5 milliLiter(s) IntraMuscular once  insulin glargine Injectable (LANTUS) 30 Unit(s) SubCutaneous at bedtime  insulin lispro (HumaLOG) corrective regimen sliding scale   SubCutaneous three times a day before meals  insulin lispro Injectable (HumaLOG) 10 Unit(s) SubCutaneous three times a day before meals  lisinopril 40 milliGRAM(s) Oral daily  methylPREDNISolone sodium succinate Injectable 60 milliGRAM(s) IV Push three times a day  metoprolol tartrate 25 milliGRAM(s) Oral two times a day    PRN MEDICATIONS  ALBUTerol/ipratropium for Nebulization 3 milliLiter(s) Nebulizer every 4 hours PRN  dextrose 40% Gel 15 Gram(s) Oral once PRN  glucagon  Injectable 1 milliGRAM(s) IntraMuscular once PRN  oxyCODONE    5 mG/acetaminophen 325 mG 1 Tablet(s) Oral every 6 hours PRN    VITALS:   T(F): 98.7  HR: 86  BP: 113/55  RR: 19  SpO2: 94%    LABS:                        15.2   12.14 )-----------( 211      ( 13 Sep 2019 06:35 )             45.8     09-13    135  |  92<L>  |  23<H>  ----------------------------<  221<H>  3.1<L>   |  25  |  1.0    Ca    8.6      13 Sep 2019 06:35  Phos  2.7     09-13  Mg     2.3     09-13    TPro  7.4  /  Alb  3.8  /  TBili  1.1  /  DBili  x   /  AST  73<H>  /  ALT  76<H>  /  AlkPhos  67  09-12    PT/INR - ( 13 Sep 2019 02:00 )   PT: 10.70 sec;   INR: 0.93 ratio         PTT - ( 13 Sep 2019 11:07 )  PTT:75.5 sec      Troponin T, Serum: 0.15 ng/mL <HH> (09-13-19 @ 06:35)  Troponin T, Serum: 0.15 ng/mL <HH> (09-12-19 @ 21:04)  Troponin T, Serum: 0.15 ng/mL <HH> (09-12-19 @ 18:48)      CARDIAC MARKERS ( 13 Sep 2019 06:35 )  x     / 0.15 ng/mL / x     / x     / x      CARDIAC MARKERS ( 12 Sep 2019 21:04 )  x     / 0.15 ng/mL / x     / x     / x      CARDIAC MARKERS ( 12 Sep 2019 18:48 )  x     / 0.15 ng/mL / x     / x     / x          RADIOLOGY:  Chest XR:  "No radiographic evidence of acute cardiopulmonary disease."    CTA Chest:  "Acute pulmonary emboli extending from the distal main pulmonary arteries   into the segmental pulmonary arteries of all pulmonary lobes.  No evidence of right heart strain.  Small peripheral opacity in the right lower and upper lobes measuring up   to 2.6 cm may reflect small pulmonary infarcts."    LE Duplex:  "Deep venous thrombosis left popliteal and gastrocnemius veins.  No evidence of deep venous thrombosis or superficial thrombophlebitis in   right lower extremity."    PHYSICAL EXAM:  Vital Signs: I have reviewed the initial vital signs.  Constitutional: well-nourished, appears stated age, no acute distress  Cardiovascular: regular rate, regular rhythm, well-perfused extremities  Respiratory: unlabored respiratory effort, clear to auscultation bilaterally  Gastrointestinal: soft, non-tender abdomen  Musculoskeletal: LE edema  Integumentary: warm, dry, no rash  Neurologic: awake, alert, extremities’ motor and sensory functions grossly intact  Psychiatric: appropriate mood, appropriate affect

## 2019-09-13 NOTE — H&P ADULT - NSICDXFAMILYHX_GEN_ALL_CORE_FT
FAMILY HISTORY:  Father  Still living? Unknown  CAD (coronary artery disease), Age at diagnosis: Age Unknown  Diabetes, Age at diagnosis: Age Unknown    Mother  Still living? Unknown  CAD (coronary artery disease), Age at diagnosis: Age Unknown  Diabetes, Age at diagnosis: Age Unknown

## 2019-09-13 NOTE — PROGRESS NOTE ADULT - ASSESSMENT
60 yo male PMHx HTN, CAD s/p CABG x3 2015, COPD, Nixon's esophagus, reactive airway disease, DM2 presented for evaluation of leg swelling and pain and shortness of breath.    # Acute submassive b/l pulmonary embolism:  - Eliquis  - trop 0.15 BNP 5000  - no radiologic evidence of rt heart strain on CT  - f/u TTE, trend CE  - f/u PTT at 11 AM  - f/u [facility] report    # DM2:  - hold home meds  - insulin basal-bolus, FS    # HTN:  - continue with hydralazine, clonidine, amlodipine, lisinopril, metoprolol    # COPD :  - c/w nebs, inhalers, solumedrol then change to PO    # CAD s/p CABG:  - c/w ASA and statin  - no chest pain or EKG changes  - if chest pain EKG, trop, cardiac workup    # DVT ppx: on heparin gtt   DASH TLC CC diet  bed rest  Full code 60 yo male PMHx HTN, CAD s/p CABG x3 2015, COPD, Nixon's esophagus, reactive airway disease, DM2 presented for evaluation of leg swelling and pain and shortness of breath.    Acute submassive b/l pulmonary embolism, LLE DVT  - Eliquis 10mg BID q12h for 7 days  - Repeat trop unchanged at 0.15, BNP 5000  - no radiologic evidence of rt heart strain on CT  - Echo: " 1. Normal global left ventricular systolic function.   2. Normal left ventricular internal cavity size.   3. Structurally normal mitral valve, with normal leaflet excursion.   4. Sclerotic aortic valve with normal opening."  - f/u PTT    DM2  - hold home meds  - insulin basal-bolus, FS    HTN  - Continue with hydralazine, clonidine, amlodipine, lisinopril, metoprolol    COPD  - C/w nebs, inhalers, solumedrol     CAD s/p CABG  - C/w ASA and statin  - No chest pain or EKG changes  - Trop unchanged 0.15.     DVT ppx  - Eliquis    Diet  - DASH TLC CC    Full code 60 yo male PMHx HTN, CAD s/p CABG x3 2015, COPD, Nixon's esophagus, reactive airway disease, DM2 presented for evaluation of leg swelling and pain and shortness of breath.    Acute submassive b/l pulmonary embolism, LLE DVT  - Eliquis 10mg BID q12h for 7 days  - Repeat trop unchanged at 0.15, BNP 5000  - no radiologic evidence of rt heart strain on CT  - Echo: " 1. Normal global left ventricular systolic function.   2. Normal left ventricular internal cavity size.   3. Structurally normal mitral valve, with normal leaflet excursion.   4. Sclerotic aortic valve with normal opening."  - f/u PTT  - Lasix 40mg BID    DM2  - hold home meds  - insulin basal-bolus, FS    HTN  - Continue with hydralazine, clonidine, amlodipine, lisinopril, metoprolol    COPD  - C/w nebs, inhalers, solumedrol     CAD s/p CABG  - C/w ASA and statin  - No chest pain or EKG changes  - Trop unchanged 0.15.     DVT ppx  - Eliquis    Diet  - DASH TLC CC    Full code

## 2019-09-13 NOTE — H&P ADULT - HISTORY OF PRESENT ILLNESS
58 yo male PMHx HTN, CAD s/p CABG x3 2015, COPD, jameson's esophagus, reactive airway disease, DM2 presented for evaluation of leg swelling and pain and shortness of breath. He reports that he's had chronic leg swelling which got worse a week ago, and yesterday was prescribed PO steroid taper by his Dr for SOB. He had an ankle fracture since March and was in a cast walked using crutches, with decreased ambulation and prolonged rercumbant durations. He denies chest pain, cough, palpitation, abdominal pain, n/v/d.    In ED EKG LL [facility] showed DVTs (pending official read), lab work showed positive troponin 0.15 and elevated BNP 5000. CT abdomen/pelvis showed b/l acute segmental and subsegmental PE and right main PA pulmonary emboli. O2 97% RA

## 2019-09-13 NOTE — PATIENT PROFILE ADULT - FALL HARM RISK
other/lower extremity swelling with pain at times/coagulation(Bleeding disorder R/T clinical cond/anti-coags)

## 2019-09-13 NOTE — CONSULT NOTE ADULT - ASSESSMENT
IMPRESSION:  Provoked submassive PE - likely due to prolonged immobility  HO COPD - stable  HO RUBEN on CPAP at night    PLAN:    CNS: Avoid depressants    HEENT: Oral care    PULMONARY:  HOB @ 45 degrees. CPAP during sleep. IR eval.    CARDIOVASCULAR: Continue home dose of lasix. Cardiology eval. 2D echo    GI: GI prophylaxis.  Feeding     RENAL:  Follow up lytes.  Correct as needed    INFECTIOUS DISEASE: Follow up cultures    HEMATOLOGICAL:  DVT prophylaxis. Eliquis 10mg q12 if no planned procedure    ENDOCRINE:  Follow up FS.  Insulin protocol if needed.    MUSCULOSKELETAL: Bedrest    MICU monitoring for today IMPRESSION:  Submassive PE - likely provoked  HO COPD - stable  HO RUBEN on CPAP at night    PLAN:    CNS: Avoid depressants    HEENT: Oral care    PULMONARY:  HOB @ 45 degrees. CPAP during sleep. IR eval.    CARDIOVASCULAR: Hold Lasix for now.  ECHO. Cardiology eval.    GI: GI prophylaxis.  Feeding     RENAL:  Follow up lytes.  Correct as needed    INFECTIOUS DISEASE: Follow up cultures    HEMATOLOGICAL:  DVT prophylaxis. Eliquis 10mg q12 if no planned procedure    ENDOCRINE:  Follow up FS.  Insulin protocol if needed.    MUSCULOSKELETAL: Bedrest    MICU monitoring for today

## 2019-09-14 NOTE — PROGRESS NOTE ADULT - ASSESSMENT
60 yo male PMHx HTN, CAD s/p CABG x3 2015, COPD, Nixon's esophagus, reactive airway disease, DM2 presented for evaluation of leg swelling and pain and shortness of breath.    Acute submassive b/l pulmonary embolism, LLE DVT  - Eliquis 10mg BID q12h for 7 days  - Repeat trop unchanged at 0.15, BNP 5000  - no radiologic evidence of rt heart strain on CT  - Echo: " 1. Normal global left ventricular systolic function.   2. Normal left ventricular internal cavity size.   3. Structurally normal mitral valve, with normal leaflet excursion.   4. Sclerotic aortic valve with normal opening."  - f/u PTT  - Lasix 40mg BID    DM2  - Insulin basal-bolus, FS    HTN  - Continue with hydralazine, clonidine, amlodipine, lisinopril, metoprolol    COPD  - C/w nebs, inhalers, solumedrol     CAD s/p CABG  - C/w ASA and statin  - No chest pain or EKG changes  - Trop unchanged 0.15.     DVT ppx  - Eliquis    Diet  - DASH TLC CC    Full code 58 yo male PMHx HTN, CAD s/p CABG x3 2015, COPD, Nixon's esophagus, reactive airway disease, DM2 presented for evaluation of leg swelling and pain and shortness of breath.    Acute submassive b/l pulmonary embolism, LLE DVT  - Eliquis 10mg BID q12h for 7 days  - Repeat trop unchanged at 0.15, BNP 5000  - no radiologic evidence of rt heart strain on CT  - Echo: " 1. Normal global left ventricular systolic function.   2. Normal left ventricular internal cavity size.   3. Structurally normal mitral valve, with normal leaflet excursion.   4. Sclerotic aortic valve with normal opening."  - f/u PTT  - Lasix 40mg BID    DM2  - Insulin basal-bolus, FS    HTN  - Continue with hydralazine, clonidine, amlodipine, lisinopril, metoprolol    COPD  - C/w nebs, inhalers, solumedrol     CAD s/p CABG  - C/w ASA and statin  - No chest pain or EKG changes  - Trop unchanged 0.15.     DVT ppx  - Eliquis    Diet  - DASH TLC CC    Full code    Dispo: downgrade MED

## 2019-09-14 NOTE — PROGRESS NOTE ADULT - SUBJECTIVE AND OBJECTIVE BOX
HPI:  60 yo male PMHx HTN, CAD s/p CABG x3 2015, COPD, jameson's esophagus, reactive airway disease, DM2 presented for evaluation of leg swelling and pain and shortness of breath. He reports that he's had chronic leg swelling which got worse a week ago, and yesterday was prescribed PO steroid taper by his Dr for SOB. He had an ankle fracture since March and was in a cast walked using crutches, with decreased ambulation and prolonged rercumbant durations. He denies chest pain, cough, palpitation, abdominal pain, n/v/d.    In ED EKG LL dupplex showed DVTs (pending official read), lab work showed positive troponin 0.15 and elevated BNP 5000. CT abdomen/pelvis showed b/l acute segmental and subsegmental PE and right main PA pulmonary emboli. O2 97% RA (13 Sep 2019 03:38)        Over Night Events:  Patient seen and examined.       ROS:  See HPI    PHYSICAL EXAM    ICU Vital Signs Last 24 Hrs  T(C): 37.1 (14 Sep 2019 04:00), Max: 37.1 (13 Sep 2019 14:00)  T(F): 98.8 (14 Sep 2019 04:00), Max: 98.8 (14 Sep 2019 04:00)  HR: 70 (14 Sep 2019 07:00) (66 - 92)  BP: 176/82 (14 Sep 2019 07:00) (113/55 - 177/72)  BP(mean): 112 (14 Sep 2019 07:00) (81 - 124)  ABP: --  ABP(mean): --  RR: 13 (14 Sep 2019 07:00) (12 - 31)  SpO2: 96% (14 Sep 2019 07:00) (92% - 98%)      General:  HEENT:                Lymph Nodes: NO cervical LN   Lungs: Bilateral BS  Cardiovascular: Regular   Abdomen: Soft, Positive BS  Extremities: No clubbing   Skin:   Neurological:     I&O's Detail    13 Sep 2019 07:01  -  14 Sep 2019 07:00  --------------------------------------------------------  IN:    heparin  Infusion.: 96 mL    insulin regular Infusion: 22 mL    Oral Fluid: 325 mL  Total IN: 443 mL    OUT:    Voided: 2725 mL  Total OUT: 2725 mL    Total NET: -2282 mL          LABS:                          14.5   13.14 )-----------( 234      ( 14 Sep 2019 04:57 )             43.5         14 Sep 2019 04:57    136    |  96     |  23     ----------------------------<  106    3.2     |  25     |  0.9      Ca    8.9        14 Sep 2019 04:57  Phos  2.7       13 Sep 2019 06:35  Mg     2.3       13 Sep 2019 06:35    TPro  5.7    /  Alb  3.1    /  TBili  0.9    /  DBili  x      /  AST  15     /  ALT  43     /  AlkPhos  53     14 Sep 2019 04:57  Amylase x     lipase x                                                 PT/INR - ( 14 Sep 2019 04:57 )   PT: 12.20 sec;   INR: 1.06 ratio         PTT - ( 14 Sep 2019 04:57 )  PTT:30.8 sec                                             CARDIAC MARKERS ( 13 Sep 2019 06:35 )  x     / 0.15 ng/mL / x     / x     / x      CARDIAC MARKERS ( 12 Sep 2019 21:04 )  x     / 0.15 ng/mL / x     / x     / x      CARDIAC MARKERS ( 12 Sep 2019 18:48 )  x     / 0.15 ng/mL / x     / x     / x                                                                                                                                                 MEDICATIONS  (STANDING):  ALBUTerol/ipratropium for Nebulization 3 milliLiter(s) Nebulizer every 4 hours  amLODIPine   Tablet 10 milliGRAM(s) Oral daily  apixaban 10 milliGRAM(s) Oral every 12 hours  aspirin enteric coated 81 milliGRAM(s) Oral daily  buDESOnide 160 MICROgram(s)/formoterol 4.5 MICROgram(s) Inhaler 2 Puff(s) Inhalation two times a day  chlorhexidine 4% Liquid 1 Application(s) Topical <User Schedule>  cloNIDine 0.2 milliGRAM(s) Oral three times a day  dextrose 5%. 1000 milliLiter(s) (50 mL/Hr) IV Continuous <Continuous>  dextrose 50% Injectable 12.5 Gram(s) IV Push once  dextrose 50% Injectable 25 Gram(s) IV Push once  dextrose 50% Injectable 25 Gram(s) IV Push once  furosemide    Tablet 20 milliGRAM(s) Oral two times a day  hydrALAZINE 25 milliGRAM(s) Oral three times a day  influenza   Vaccine 0.5 milliLiter(s) IntraMuscular once  insulin regular Infusion 10 Unit(s)/Hr (10 mL/Hr) IV Continuous <Continuous>  lisinopril 40 milliGRAM(s) Oral daily  methylPREDNISolone sodium succinate Injectable 60 milliGRAM(s) IV Push three times a day  metoprolol tartrate 25 milliGRAM(s) Oral two times a day    MEDICATIONS  (PRN):  ALBUTerol/ipratropium for Nebulization 3 milliLiter(s) Nebulizer every 4 hours PRN Shortness of Breath and/or Wheezing  dextrose 40% Gel 15 Gram(s) Oral once PRN Blood Glucose LESS THAN 70 milliGRAM(s)/deciliter  glucagon  Injectable 1 milliGRAM(s) IntraMuscular once PRN Glucose LESS THAN 70 milligrams/deciliter  oxyCODONE    5 mG/acetaminophen 325 mG 1 Tablet(s) Oral every 6 hours PRN Severe Pain (7 - 10)          Xrays:  TLC:  OG:  ET tube:                                                                                       ECHO:    IMPRESSION:      PLAN:    CNS:    HEENT:    PULMONARY:    CARDIOVASCULAR:    GI: GI prophylaxis.  Feeding     RENAL:    INFECTIOUS DISEASE:    HEMATOLOGICAL:  DVT prophylaxis.    ENDOCRINE:  Follow up FS.  Insulin protocol if needed.    MUSCULOSKELETAL:      45 minutes of critical care time spent providing medical care for patient's acute illness/conditions that impairs at least one vital organ system and/or poses a high risk of imminent or life threatening deterioration in the patient's condition. It includes time spent evaluating and treating the patient's acute illness as well as time spent reviewing labs, radiology, discussing goals of care with patient and/or patient's family, and discussing the case with a multidisciplinary team in an effort to prevent further life threatening deterioration or end organ damage. This time is independent of any procedures performed. HPI:  60 yo male PMHx HTN, CAD s/p CABG x3 2015, COPD, jameson's esophagus, reactive airway disease, DM2 presented for evaluation of leg swelling and pain and shortness of breath. He reports that he's had chronic leg swelling which got worse a week ago, and yesterday was prescribed PO steroid taper by his Dr for SOB. He had an ankle fracture since March and was in a cast walked using crutches, with decreased ambulation and prolonged rercumbant durations. He denies chest pain, cough, palpitation, abdominal pain, n/v/d.    In ED EKG LL dupplex showed DVTs (pending official read), lab work showed positive troponin 0.15 and elevated BNP 5000. CT abdomen/pelvis showed b/l acute segmental and subsegmental PE and right main PA pulmonary emboli. O2 97% RA (13 Sep 2019 03:38)        Over Night Events:  Patient seen and examined.   remains on insulin gtt    ROS:  See HPI    PHYSICAL EXAM    ICU Vital Signs Last 24 Hrs  T(C): 37.1 (14 Sep 2019 04:00), Max: 37.1 (13 Sep 2019 14:00)  T(F): 98.8 (14 Sep 2019 04:00), Max: 98.8 (14 Sep 2019 04:00)  HR: 70 (14 Sep 2019 07:00) (66 - 92)  BP: 176/82 (14 Sep 2019 07:00) (113/55 - 177/72)  BP(mean): 112 (14 Sep 2019 07:00) (81 - 124)  ABP: --  ABP(mean): --  RR: 13 (14 Sep 2019 07:00) (12 - 31)  SpO2: 96% (14 Sep 2019 07:00) (92% - 98%)      General:  HEENT:                Lymph Nodes: NO cervical LN   Lungs: Bilateral BS  Cardiovascular: Regular   Abdomen: Soft, Positive BS  Extremities: No clubbing   Skin:   Neurological:     I&O's Detail    13 Sep 2019 07:01  -  14 Sep 2019 07:00  --------------------------------------------------------  IN:    heparin  Infusion.: 96 mL    insulin regular Infusion: 22 mL    Oral Fluid: 325 mL  Total IN: 443 mL    OUT:    Voided: 2725 mL  Total OUT: 2725 mL    Total NET: -2282 mL          LABS:                          14.5   13.14 )-----------( 234      ( 14 Sep 2019 04:57 )             43.5         14 Sep 2019 04:57    136    |  96     |  23     ----------------------------<  106    3.2     |  25     |  0.9      Ca    8.9        14 Sep 2019 04:57  Phos  2.7       13 Sep 2019 06:35  Mg     2.3       13 Sep 2019 06:35    TPro  5.7    /  Alb  3.1    /  TBili  0.9    /  DBili  x      /  AST  15     /  ALT  43     /  AlkPhos  53     14 Sep 2019 04:57  Amylase x     lipase x                                                 PT/INR - ( 14 Sep 2019 04:57 )   PT: 12.20 sec;   INR: 1.06 ratio         PTT - ( 14 Sep 2019 04:57 )  PTT:30.8 sec                                             CARDIAC MARKERS ( 13 Sep 2019 06:35 )  x     / 0.15 ng/mL / x     / x     / x      CARDIAC MARKERS ( 12 Sep 2019 21:04 )  x     / 0.15 ng/mL / x     / x     / x      CARDIAC MARKERS ( 12 Sep 2019 18:48 )  x     / 0.15 ng/mL / x     / x     / x                                                                                                                                                 MEDICATIONS  (STANDING):  ALBUTerol/ipratropium for Nebulization 3 milliLiter(s) Nebulizer every 4 hours  amLODIPine   Tablet 10 milliGRAM(s) Oral daily  apixaban 10 milliGRAM(s) Oral every 12 hours  aspirin enteric coated 81 milliGRAM(s) Oral daily  buDESOnide 160 MICROgram(s)/formoterol 4.5 MICROgram(s) Inhaler 2 Puff(s) Inhalation two times a day  chlorhexidine 4% Liquid 1 Application(s) Topical <User Schedule>  cloNIDine 0.2 milliGRAM(s) Oral three times a day  dextrose 5%. 1000 milliLiter(s) (50 mL/Hr) IV Continuous <Continuous>  dextrose 50% Injectable 12.5 Gram(s) IV Push once  dextrose 50% Injectable 25 Gram(s) IV Push once  dextrose 50% Injectable 25 Gram(s) IV Push once  furosemide    Tablet 20 milliGRAM(s) Oral two times a day  hydrALAZINE 25 milliGRAM(s) Oral three times a day  influenza   Vaccine 0.5 milliLiter(s) IntraMuscular once  insulin regular Infusion 10 Unit(s)/Hr (10 mL/Hr) IV Continuous <Continuous>  lisinopril 40 milliGRAM(s) Oral daily  methylPREDNISolone sodium succinate Injectable 60 milliGRAM(s) IV Push three times a day  metoprolol tartrate 25 milliGRAM(s) Oral two times a day    MEDICATIONS  (PRN):  ALBUTerol/ipratropium for Nebulization 3 milliLiter(s) Nebulizer every 4 hours PRN Shortness of Breath and/or Wheezing  dextrose 40% Gel 15 Gram(s) Oral once PRN Blood Glucose LESS THAN 70 milliGRAM(s)/deciliter  glucagon  Injectable 1 milliGRAM(s) IntraMuscular once PRN Glucose LESS THAN 70 milligrams/deciliter  oxyCODONE    5 mG/acetaminophen 325 mG 1 Tablet(s) Oral every 6 hours PRN Severe Pain (7 - 10)          IMPRESSION:  BL PE  DVT LLE  hyperglycemia on insulin gtt  DM  replace K     PLAN:    CNS:  no sedatives  HEENT:  oral care  PULMONARY:  on RA  CARDIOVASCULAR:  on no pressors  GI: GI prophylaxis.  Feeding     RENAL:  replace K  INFECTIOUS DISEASE:  no ABX  HEMATOLOGICAL:  DVT prophylaxis.    ENDOCRINE:  Follow up FS.  Insulin: transition to SQ    MUSCULOSKELETAL: OOB      45 minutes of critical care time spent providing medical care for patient's acute illness/conditions that impairs at least one vital organ system and/or poses a high risk of imminent or life threatening deterioration in the patient's condition. It includes time spent evaluating and treating the patient's acute illness as well as time spent reviewing labs, radiology, discussing goals of care with patient and/or patient's family, and discussing the case with a multidisciplinary team in an effort to prevent further life threatening deterioration or end organ damage. This time is independent of any procedures performed.

## 2019-09-14 NOTE — CHART NOTE - NSCHARTNOTEFT_GEN_A_CORE
MICU Transfer Note    Transfer from: MICU  Transfer to:  (X) Medicine    (  ) Telemetry    (  ) RCU    (  ) Palliative    (  ) Stroke Unit    (  ) _______________  Accepting physician:      MICU COURSE:  Patient is a 59y old Male who presented with SOB and bilateral LE edema worse than usual. Pt has a hx of HTN, CAD s/p CABG x3, COPD, jameson's esophagus, reactive airway disease, DM2. Had an ankle injury in March and has been ambulating less since then, but is still ambulating with cane.   CTA showed bilateral PE and LL duplex showed DVTs.    Throughout MICU stay, pt remained stable, in no distress. Was never SOB, O2 stat always normal. Has LE edema, being treated with Lasix 40mg BID. Pt switched from Heparin to Eliquis, IV insulin to SubQ, IV steroid to oral steroid. Did not receive any oral steroid dose yet in MICU.       ASSESSMENT & PLAN:   Acute submassive b/l pulmonary embolism, LLE DVT  - Eliquis 10mg BID q12h for 7 days  - Repeat trop unchanged at 0.15, BNP 5000  - no radiologic evidence of rt heart strain on CT  - Echo: " 1. Normal global left ventricular systolic function.   2. Normal left ventricular internal cavity size.   3. Structurally normal mitral valve, with normal leaflet excursion.   4. Sclerotic aortic valve with normal opening."  - f/u PTT  - Lasix 40mg BID    DM2  - Insulin basal-bolus, FS    HTN  - Continue with hydralazine, clonidine, amlodipine, lisinopril, metoprolol    COPD  - C/w nebs, inhalers, solumedrol     CAD s/p CABG  - C/w ASA and statin  - No chest pain or EKG changes  - Trop unchanged 0.15.     DVT ppx  - Eliquis    Diet  - DASH TLC CC        For Follow-Up:  - Prednisone 40mg beginning tomorrow   - Currently on initial 7 day dose of Eliquis  - Was not compliant on home diabetes meds due to price. Needs new regimen for discharge.   - F/u Potassium which was replenished today 9/14/19        Vital Signs Last 24 Hrs  T(C): 36.1 (14 Sep 2019 16:00), Max: 37.1 (14 Sep 2019 04:00)  T(F): 96.9 (14 Sep 2019 16:00), Max: 98.8 (14 Sep 2019 04:00)  HR: 72 (14 Sep 2019 19:00) (64 - 100)  BP: 94/61 (14 Sep 2019 19:00) (94/61 - 176/82)  BP(mean): 75 (14 Sep 2019 19:00) (56 - 133)  RR: 18 (14 Sep 2019 19:00) (12 - 49)  SpO2: 90% (14 Sep 2019 19:00) (83% - 98%)  I&O's Summary    13 Sep 2019 07:01  -  14 Sep 2019 07:00  --------------------------------------------------------  IN: 561 mL / OUT: 2725 mL / NET: -2164 mL    14 Sep 2019 07:01  -  14 Sep 2019 20:54  --------------------------------------------------------  IN: 41 mL / OUT: 1800 mL / NET: -1759 mL          MEDICATIONS  (STANDING):  ALBUTerol/ipratropium for Nebulization 3 milliLiter(s) Nebulizer every 4 hours  amLODIPine   Tablet 10 milliGRAM(s) Oral daily  apixaban 10 milliGRAM(s) Oral every 12 hours  aspirin enteric coated 81 milliGRAM(s) Oral daily  buDESOnide 160 MICROgram(s)/formoterol 4.5 MICROgram(s) Inhaler 2 Puff(s) Inhalation two times a day  chlorhexidine 4% Liquid 1 Application(s) Topical <User Schedule>  cloNIDine 0.2 milliGRAM(s) Oral three times a day  dextrose 5%. 1000 milliLiter(s) (50 mL/Hr) IV Continuous <Continuous>  dextrose 50% Injectable 12.5 Gram(s) IV Push once  dextrose 50% Injectable 25 Gram(s) IV Push once  dextrose 50% Injectable 25 Gram(s) IV Push once  furosemide    Tablet 40 milliGRAM(s) Oral two times a day  hydrALAZINE 25 milliGRAM(s) Oral three times a day  influenza   Vaccine 0.5 milliLiter(s) IntraMuscular once  insulin glargine Injectable (LANTUS) 30 Unit(s) SubCutaneous every morning  insulin glargine Injectable (LANTUS) 30 Unit(s) SubCutaneous at bedtime  insulin lispro (HumaLOG) corrective regimen sliding scale   SubCutaneous three times a day before meals  insulin lispro Injectable (HumaLOG) 15 Unit(s) SubCutaneous three times a day before meals  lisinopril 40 milliGRAM(s) Oral daily  metoprolol tartrate 25 milliGRAM(s) Oral two times a day  potassium chloride    Tablet ER 40 milliEquivalent(s) Oral every 4 hours    MEDICATIONS  (PRN):  ALBUTerol/ipratropium for Nebulization 3 milliLiter(s) Nebulizer every 4 hours PRN Shortness of Breath and/or Wheezing  dextrose 40% Gel 15 Gram(s) Oral once PRN Blood Glucose LESS THAN 70 milliGRAM(s)/deciliter  glucagon  Injectable 1 milliGRAM(s) IntraMuscular once PRN Glucose LESS THAN 70 milligrams/deciliter  oxyCODONE    5 mG/acetaminophen 325 mG 1 Tablet(s) Oral every 6 hours PRN Severe Pain (7 - 10)        LABS                                            14.5                  Neurophils% (auto):   x      (09-14 @ 04:57):    13.14)-----------(234          Lymphocytes% (auto):  x                                             43.5                   Eosinphils% (auto):   x        Manual%: Neutrophils x    ; Lymphocytes x    ; Eosinophils x    ; Bands%: x    ; Blasts x                                    136    |  96     |  23                  Calcium: 8.9   / iCa: x      (09-14 @ 04:57)    ----------------------------<  106       Magnesium: x                                3.2     |  25     |  0.9              Phosphorous: x        TPro  5.7    /  Alb  3.1    /  TBili  0.9    /  DBili  x      /  AST  15     /  ALT  43     /  AlkPhos  53     14 Sep 2019 04:57    ( 09-14 @ 04:57 )   PT: 12.20 sec;   INR: 1.06 ratio  aPTT: 30.8 sec

## 2019-09-15 NOTE — PROGRESS NOTE ADULT - SUBJECTIVE AND OBJECTIVE BOX
HPI:  60 yo male PMHx HTN, CAD s/p CABG x3 2015, COPD, jameson's esophagus, reactive airway disease, DM2 presented for evaluation of leg swelling and pain and shortness of breath. He reports that he's had chronic leg swelling which got worse a week ago, and yesterday was prescribed PO steroid taper by his Dr for SOB. He had an ankle fracture since March and was in a cast walked using crutches, with decreased ambulation and prolonged rercumbant durations. He denies chest pain, cough, palpitation, abdominal pain, n/v/d.    In ED EKG LL dupplex showed DVTs (pending official read), lab work showed positive troponin 0.15 and elevated BNP 5000. CT abdomen/pelvis showed b/l acute segmental and subsegmental PE and right main PA pulmonary emboli. O2 97% RA (13 Sep 2019 03:38)        Over Night Events:  Patient seen and examined.       ROS:  See HPI    PHYSICAL EXAM    ICU Vital Signs Last 24 Hrs  T(C): 35.8 (15 Sep 2019 05:48), Max: 36.8 (14 Sep 2019 08:00)  T(F): 96.4 (15 Sep 2019 05:48), Max: 98.2 (14 Sep 2019 08:00)  HR: 74 (15 Sep 2019 05:48) (64 - 100)  BP: 153/83 (15 Sep 2019 05:48) (94/61 - 162/80)  BP(mean): 90 (14 Sep 2019 22:00) (56 - 133)  ABP: --  ABP(mean): --  RR: 18 (15 Sep 2019 05:48) (12 - 49)  SpO2: 97% (14 Sep 2019 23:10) (83% - 97%)      General:  HEENT:                Lymph Nodes: NO cervical LN   Lungs: Bilateral BS  Cardiovascular: Regular   Abdomen: Soft, Positive BS  Extremities: No clubbing   Skin:   Neurological:     I&O's Detail    14 Sep 2019 07:01  -  15 Sep 2019 07:00  --------------------------------------------------------  IN:    insulin regular Infusion: 41 mL  Total IN: 41 mL    OUT:    Voided: 2700 mL  Total OUT: 2700 mL    Total NET: -2659 mL          LABS:                          13.9   13.41 )-----------( 223      ( 15 Sep 2019 05:14 )             42.8         14 Sep 2019 04:57    136    |  96     |  23     ----------------------------<  106    3.2     |  25     |  0.9      Ca    8.9        14 Sep 2019 04:57                                               PT/INR - ( 14 Sep 2019 04:57 )   PT: 12.20 sec;   INR: 1.06 ratio         PTT - ( 14 Sep 2019 04:57 )  PTT:30.8 sec                                                                                                                                                                                        MEDICATIONS  (STANDING):  ALBUTerol/ipratropium for Nebulization 3 milliLiter(s) Nebulizer every 4 hours  amLODIPine   Tablet 10 milliGRAM(s) Oral daily  apixaban 10 milliGRAM(s) Oral every 12 hours  aspirin enteric coated 81 milliGRAM(s) Oral daily  buDESOnide 160 MICROgram(s)/formoterol 4.5 MICROgram(s) Inhaler 2 Puff(s) Inhalation two times a day  chlorhexidine 4% Liquid 1 Application(s) Topical <User Schedule>  cloNIDine 0.2 milliGRAM(s) Oral three times a day  dextrose 5%. 1000 milliLiter(s) (50 mL/Hr) IV Continuous <Continuous>  dextrose 50% Injectable 12.5 Gram(s) IV Push once  dextrose 50% Injectable 25 Gram(s) IV Push once  dextrose 50% Injectable 25 Gram(s) IV Push once  furosemide    Tablet 40 milliGRAM(s) Oral two times a day  hydrALAZINE 25 milliGRAM(s) Oral three times a day  influenza   Vaccine 0.5 milliLiter(s) IntraMuscular once  insulin glargine Injectable (LANTUS) 30 Unit(s) SubCutaneous every morning  insulin glargine Injectable (LANTUS) 30 Unit(s) SubCutaneous at bedtime  insulin lispro (HumaLOG) corrective regimen sliding scale   SubCutaneous three times a day before meals  insulin lispro Injectable (HumaLOG) 15 Unit(s) SubCutaneous three times a day before meals  lisinopril 40 milliGRAM(s) Oral daily  metoprolol tartrate 25 milliGRAM(s) Oral two times a day    MEDICATIONS  (PRN):  ALBUTerol/ipratropium for Nebulization 3 milliLiter(s) Nebulizer every 4 hours PRN Shortness of Breath and/or Wheezing  dextrose 40% Gel 15 Gram(s) Oral once PRN Blood Glucose LESS THAN 70 milliGRAM(s)/deciliter  glucagon  Injectable 1 milliGRAM(s) IntraMuscular once PRN Glucose LESS THAN 70 milligrams/deciliter  oxyCODONE    5 mG/acetaminophen 325 mG 1 Tablet(s) Oral every 6 hours PRN Severe Pain (7 - 10)          Xrays:  TLC:  OG:  ET tube:                                                                                       ECHO:    IMPRESSION:      PLAN:    CNS:    HEENT:    PULMONARY:    CARDIOVASCULAR:    GI: GI prophylaxis.  Feeding     RENAL:    INFECTIOUS DISEASE:    HEMATOLOGICAL:  DVT prophylaxis.    ENDOCRINE:  Follow up FS.  Insulin protocol if needed.    MUSCULOSKELETAL:      45 minutes of critical care time spent providing medical care for patient's acute illness/conditions that impairs at least one vital organ system and/or poses a high risk of imminent or life threatening deterioration in the patient's condition. It includes time spent evaluating and treating the patient's acute illness as well as time spent reviewing labs, radiology, discussing goals of care with patient and/or patient's family, and discussing the case with a multidisciplinary team in an effort to prevent further life threatening deterioration or end organ damage. This time is independent of any procedures performed.

## 2019-09-15 NOTE — PROGRESS NOTE ADULT - SUBJECTIVE AND OBJECTIVE BOX
Hospital Day:  2d    Subjective:    Patient is a 59y old  Male who presents with a chief complaint of pulmonary embolism (14 Sep 2019 20:48)    Interval: No acute events overnight. In middle of day complained of some tightness while breathing, said could not tolerate albuterol well, was jittery, gave spiriva treatment, improved. Complaining of some tenderness at site of previous fracture on right distal tibia.    Past Medical Hx:   SLE (systemic lupus erythematosus)  Thyroid nodule  Sjogrens syndrome  Obesity  Hiatal hernia with GERD  Murmur  COPD (chronic obstructive pulmonary disease)  Asthma  Obstructive sleep apnea on CPAP  Diabetes  HTN (hypertension)  Myocardial infarction  CAD (coronary artery disease) of bypass graft  Arthritis    Past Sx:  History of surgery  History of surgery    Allergies:  Mushrooms (Other (Severe))  No Known Drug Allergies    Current Meds:   Stand Meds:  ALBUTerol/ipratropium for Nebulization 3 milliLiter(s) Nebulizer every 4 hours  amLODIPine   Tablet 10 milliGRAM(s) Oral daily  apixaban 10 milliGRAM(s) Oral every 12 hours  aspirin enteric coated 81 milliGRAM(s) Oral daily  buDESOnide 160 MICROgram(s)/formoterol 4.5 MICROgram(s) Inhaler 2 Puff(s) Inhalation two times a day  chlorhexidine 4% Liquid 1 Application(s) Topical <User Schedule>  cloNIDine 0.2 milliGRAM(s) Oral three times a day  dextrose 5%. 1000 milliLiter(s) (50 mL/Hr) IV Continuous <Continuous>  dextrose 50% Injectable 12.5 Gram(s) IV Push once  dextrose 50% Injectable 25 Gram(s) IV Push once  dextrose 50% Injectable 25 Gram(s) IV Push once  furosemide    Tablet 40 milliGRAM(s) Oral two times a day  hydrALAZINE 25 milliGRAM(s) Oral three times a day  influenza   Vaccine 0.5 milliLiter(s) IntraMuscular once  insulin glargine Injectable (LANTUS) 30 Unit(s) SubCutaneous every morning  insulin glargine Injectable (LANTUS) 30 Unit(s) SubCutaneous at bedtime  insulin lispro (HumaLOG) corrective regimen sliding scale   SubCutaneous three times a day before meals  insulin lispro Injectable (HumaLOG) 15 Unit(s) SubCutaneous three times a day before meals  lisinopril 40 milliGRAM(s) Oral daily  metoprolol tartrate 25 milliGRAM(s) Oral two times a day  predniSONE   Tablet 40 milliGRAM(s) Oral daily    PRN Meds:  ALBUTerol/ipratropium for Nebulization 3 milliLiter(s) Nebulizer every 4 hours PRN Shortness of Breath and/or Wheezing  dextrose 40% Gel 15 Gram(s) Oral once PRN Blood Glucose LESS THAN 70 milliGRAM(s)/deciliter  glucagon  Injectable 1 milliGRAM(s) IntraMuscular once PRN Glucose LESS THAN 70 milligrams/deciliter  oxyCODONE    5 mG/acetaminophen 325 mG 1 Tablet(s) Oral every 6 hours PRN Severe Pain (7 - 10)    HOME MEDICATIONS:  amLODIPine 10 mg oral tablet: 1 tab(s) orally once a day  Aspir 81 oral delayed release tablet: 1 tab(s) orally once a day  Bystolic 20 mg oral tablet: 1 tab(s) orally once a day  cloNIDine 0.2 mg oral tablet: orally 3 times a day  hydrALAZINE 25 mg oral tablet: orally 3 times a day  Invokana 300 mg oral tablet: 1 tab(s) orally once a day  metoprolol tartrate 25 mg oral tablet: 1 tab(s) orally 2 times a day  oxycodone-acetaminophen 5 mg-325 mg/5 mL oral solution: orally once a day, As Needed  Trulicity Pen: 1.75 mg sq q week tue      Vital Signs:   T(F): 96.8 (09-15-19 @ 20:43), Max: 96.8 (09-15-19 @ 20:43)  HR: 81 (09-15-19 @ 21:28) (72 - 95)  BP: 158/70 (09-15-19 @ 21:28) (144/67 - 196/89)  RR: 18 (09-15-19 @ 20:43) (15 - 18)  SpO2: 95% (09-15-19 @ 21:28) (95% - 97%)      09-14-19 @ 07:01  -  09-15-19 @ 07:00  --------------------------------------------------------  IN: 41 mL / OUT: 2700 mL / NET: -2659 mL    09-15-19 @ 07:01  -  09-15-19 @ 21:31  --------------------------------------------------------  IN: 0 mL / OUT: 700 mL / NET: -700 mL        Physical Exam:   GENERAL: NAD  HEENT: NCAT  CHEST/LUNG: CTAB  HEART: Regular rate and rhythm; s1 s2 appreciated  ABDOMEN: Soft, Nontender, Nondistended; Bowel sounds present  EXTREMITIES: Bilateral 3+ pitting edema to lower extremities  NERVOUS SYSTEM:  Alert & Oriented X3        Labs:                         14.9   14.91 )-----------( 235      ( 15 Sep 2019 18:18 )             45.1       15 Sep 2019 12:27    142    |  100    |  25     ----------------------------<  84     3.6     |  27     |  1.0      Ca    9.0        15 Sep 2019 12:27    TPro  5.7    /  Alb  3.1    /  TBili  0.9    /  DBili  x      /  AST  15     /  ALT  43     /  AlkPhos  53     14 Sep 2019 04:57          Hemoglobin A1C, Whole Blood: 12.5 % (09-14-19 @ 04:57)    Serum Pro-Brain Natriuretic Peptide: 4860 pg/mL (09-12-19 @ 18:48)    Troponin 0.15, CKMB --, CK -- 09-13-19 @ 06:35  Troponin 0.15, CKMB --, CK -- 09-12-19 @ 21:04  Troponin 0.15, CKMB --, CK -- 09-12-19 @ 18:48                Radiology:   < from: Xray Chest 1 View- PORTABLE-Routine (09.14.19 @ 04:52) >  Impression:      No radiographicevidence of acute cardiopulmonary disease. Stable   cardiomegaly    < end of copied text >

## 2019-09-15 NOTE — PROGRESS NOTE ADULT - ASSESSMENT
60 yo male PMHx HTN, CAD s/p CABG x3 2015, COPD, CHF? jameson's esophagus, reactive airway disease, DM2 presented for evaluation of leg swelling and pain and shortness of breath.    Acute submassive b/l pulmonary embolism, LLE DVT  - Eliquis 10mg BID q12h for 7 days  - Repeat trop unchanged at 0.15, BNP 5000  - no radiologic evidence of rt heart strain on CT  - Echo normal  - Lasix 40mg PO BID    DM2  - Insulin basal-bolus, FS  - Was not compliant on home diabetes meds due to price. Needs new regimen for discharge.     HTN  - Continue with hydralazine, clonidine, amlodipine, lisinopril, metoprolol    COPD  - C/w nebs, inhalers, solumedrol   - 40 mg prednisone    CAD s/p CABG  - C/w ASA and statin  - No chest pain or EKG changes  - Trop unchanged 0.15.     Activity: AAT  GI: protonix  DVT ppx: Eliquis  Diet: DASH TLC CC  Full Code

## 2019-09-16 NOTE — PHYSICAL THERAPY INITIAL EVALUATION ADULT - GENERAL OBSERVATIONS, REHAB EVAL
pt encountered in bed - pt differed PT IE due to family visiting - PT will follow up
pt encountered supine in bed in NAD, breathing on RA, agreeable to PT IE. During ambulation patient desaturated fluctuating between 80-90%. decision was made to return patient to room. pt asked to use restroom - pt left in restroom with inhaler - pt given specific instructions to not get up and SINDY Peres made aware

## 2019-09-16 NOTE — PHYSICAL THERAPY INITIAL EVALUATION ADULT - PERTINENT HX OF CURRENT PROBLEM, REHAB EVAL
60 yo male  presented for evaluation of leg swelling and pain and shortness of breath. Found to have PE

## 2019-09-16 NOTE — PROGRESS NOTE ADULT - SUBJECTIVE AND OBJECTIVE BOX
MELECIOMATHEW  59y, Male  Allergy: Mushrooms (Other (Severe))  No Known Drug Allergies    Hospital Day: 3d    Patient seen and examined earlier today. No acute events overnight. Patient states he has been feeling well. Less dyspnea today.    PMH/PSH:  PAST MEDICAL & SURGICAL HISTORY:  SLE (systemic lupus erythematosus)  Thyroid nodule  Sjogrens syndrome  Obesity  Hiatal hernia with GERD  Murmur  COPD (chronic obstructive pulmonary disease): post 911  Asthma: stable post 911  Obstructive sleep apnea on CPAP  Diabetes  HTN (hypertension)  Myocardial infarction: 2015  CAD (coronary artery disease) of bypass graft: 2015 x 3 vd  Arthritis: ra hands knees  History of surgery: 2015 x 3 v  History of surgery: 1990- rct sx    VITALS:  T(F): 96.3 (09-16-19 @ 13:14), Max: 96.9 (09-16-19 @ 05:51)  HR: 94 (09-16-19 @ 13:14)  BP: 185/82 (09-16-19 @ 13:14) (145/85 - 200/93)  RR: 18 (09-16-19 @ 13:14)  SpO2: 95% (09-15-19 @ 21:28)    TESTS & MEASUREMENTS:  Weight (Kg): 149 (09-14-19 @ 23:10)  BMI (kg/m2): 45.8 (09-14)    09-14-19 @ 07:01  -  09-15-19 @ 07:00  --------------------------------------------------------  IN: 41 mL / OUT: 2700 mL / NET: -2659 mL    09-15-19 @ 07:01  -  09-16-19 @ 07:00  --------------------------------------------------------  IN: 0 mL / OUT: 1650 mL / NET: -1650 mL                      13.6   11.73 )-----------( 217      ( 16 Sep 2019 06:28 )             42.3     09-16    138  |  98  |  29<H>  ----------------------------<  200<H>  3.9   |  27  |  1.1    Ca    8.7      16 Sep 2019 06:28  Mg     2.1     09-16    TPro  5.4<L>  /  Alb  3.1<L>  /  TBili  0.9  /  DBili  x   /  AST  18  /  ALT  35  /  AlkPhos  55  09-16    LIVER FUNCTIONS - ( 16 Sep 2019 06:28 )  Alb: 3.1 g/dL / Pro: 5.4 g/dL / ALK PHOS: 55 U/L / ALT: 35 U/L / AST: 18 U/L / GGT: x           MEDICATIONS:  MEDICATIONS  (STANDING):  ALBUTerol/ipratropium for Nebulization 3 milliLiter(s) Nebulizer every 4 hours  amLODIPine   Tablet 10 milliGRAM(s) Oral daily  apixaban 10 milliGRAM(s) Oral every 12 hours  aspirin enteric coated 81 milliGRAM(s) Oral daily  buDESOnide 160 MICROgram(s)/formoterol 4.5 MICROgram(s) Inhaler 2 Puff(s) Inhalation two times a day  chlorhexidine 4% Liquid 1 Application(s) Topical <User Schedule>  cloNIDine 0.2 milliGRAM(s) Oral three times a day  dextrose 5%. 1000 milliLiter(s) (50 mL/Hr) IV Continuous <Continuous>  dextrose 50% Injectable 12.5 Gram(s) IV Push once  dextrose 50% Injectable 25 Gram(s) IV Push once  dextrose 50% Injectable 25 Gram(s) IV Push once  furosemide    Tablet 40 milliGRAM(s) Oral two times a day  hydrALAZINE 25 milliGRAM(s) Oral three times a day  influenza   Vaccine 0.5 milliLiter(s) IntraMuscular once  insulin glargine Injectable (LANTUS) 30 Unit(s) SubCutaneous every morning  insulin glargine Injectable (LANTUS) 30 Unit(s) SubCutaneous at bedtime  insulin lispro (HumaLOG) corrective regimen sliding scale   SubCutaneous three times a day before meals  insulin lispro Injectable (HumaLOG) 15 Unit(s) SubCutaneous three times a day before meals  lisinopril 40 milliGRAM(s) Oral daily  metoprolol tartrate 25 milliGRAM(s) Oral two times a day  pantoprazole    Tablet 40 milliGRAM(s) Oral before breakfast  predniSONE   Tablet 40 milliGRAM(s) Oral daily    MEDICATIONS  (PRN):  ALBUTerol/ipratropium for Nebulization 3 milliLiter(s) Nebulizer every 4 hours PRN Shortness of Breath and/or Wheezing  dextrose 40% Gel 15 Gram(s) Oral once PRN Blood Glucose LESS THAN 70 milliGRAM(s)/deciliter  glucagon  Injectable 1 milliGRAM(s) IntraMuscular once PRN Glucose LESS THAN 70 milligrams/deciliter  oxyCODONE    5 mG/acetaminophen 325 mG 1 Tablet(s) Oral every 6 hours PRN Severe Pain (7 - 10)    HOME MEDICATIONS:  amLODIPine 10 mg oral tablet (05-15)  Aspir 81 oral delayed release tablet (05-15)  Bystolic 20 mg oral tablet (05-15)  cloNIDine 0.2 mg oral tablet (05-15)  hydrALAZINE 25 mg oral tablet (05-15)  Invokana 300 mg oral tablet (05-15)  metoprolol tartrate 25 mg oral tablet (05-15)  oxycodone-acetaminophen 5 mg-325 mg/5 mL oral solution (05-15)  Trulicity Pen (05-15)    REVIEW OF SYSTEMS:  All other review of systems is negative unless indicated above.     PHYSICAL EXAM:  GENERAL: A&O x3,  in NAD/P,   HEENT: No Swelling  CHEST/LUNG: Good air entry, No wheezing  HEART: RRR, No murmurs  ABDOMEN: Soft, Bowel sounds present  EXTREMITIES:  No clubbing,

## 2019-09-16 NOTE — PROGRESS NOTE ADULT - ASSESSMENT
60 yo male PMHx HTN, CAD s/p CABG x3 2015, COPD, Nixon's esophagus, reactive airway disease, DM2 presented for evaluation of leg swelling and pain and shortness of breath. Manged in ICU for bilateral PE and LLE DVT and downgraded to medical floor 09/16.    1. LLE DVT c/b Bilateral PE: On Eliquis. No radiologic evidence of Rt heart strain on CT and also, Echo. Check ambulating pulse ox. DC planning  2. DM2: Cont current regimen, lantus 30 BID, Lispro TIDAC, ISS  3. HTN: On hydralazine, clonidine, amlodipine, lisinopril, and metoprolol  4. COPD: c/w current inhalers, duonebs PRB  5. CAD s/p CABG: ASA/Statin    GI/DVT PPX    #Progress Note Handoff  Pending (specify): Discharge Planning  Family discussion: Discussed with patient regarding DC  Disposition: Home

## 2019-09-16 NOTE — PROGRESS NOTE ADULT - ASSESSMENT
Acute submassive b/l pulmonary embolism, LLE DVT  - Eliquis 10mg BID q12h for 7 days (till 9/20)  - Repeat trop unchanged at 0.15, BNP 5000  - no radiologic evidence of rt heart strain on CT  - Echo normal  - Lasix 40mg PO BID    DM2  - Insulin basal-bolus, FS  - Was not compliant on home diabetes meds due to price. Needs new regimen for discharge.     HTN  - Continue with hydralazine, clonidine, amlodipine, lisinopril, metoprolol    COPD  - C/w nebs, inhalers, solumedrol   - 40 mg prednisone, will taper    Hypokalemia  - resolved, s/p repletion  - will monitor    CAD s/p CABG  - C/w ASA and statin  - No chest pain or EKG changes  - Trop unchanged 0.15.     Activity: AAT  GI: protonix  DVT ppx: Eliquis  Diet: DASH TLC CC  Full Code Patient is a 59y old Male who presented with SOB and bilateral LE edema worse than usual. Pt has a hx of HTN, CAD s/p CABG x3, COPD, jameson's esophagus, reactive airway disease, DM2. Had an ankle injury in March and has been ambulating less since then, but is still ambulating with cane.   CTA showed bilateral PE and LL duplex showed DVTs.    Throughout MICU stay, pt remained stable, in no distress. Was never SOB, O2 stat always normal. Has LE edema, being treated with Lasix 40mg BID. Pt switched from Heparin to Eliquis, IV insulin to SubQ, IV steroid to oral steroid. Did not receive any oral steroid dose yet in MICU.     # Acute submassive b/l pulmonary embolism, LLE DVT  - Eliquis 10mg BID q12h for 7 days (till 9/20)  - Repeat trop unchanged at 0.15, BNP 5000  - no radiologic evidence of rt heart strain on CT  - Echo normal  - Lasix 40mg PO BID    # DM2  - Insulin basal-bolus, FS  - Was not compliant on home diabetes meds due to price. Needs new regimen for discharge.     # HTN  - Continue with hydralazine, clonidine, amlodipine, lisinopril, metoprolol    # COPD  - C/w nebs, inhalers, solumedrol   - 40 mg prednisone, will taper    # Hypokalemia  - resolved, s/p repletion  - will monitor    # CAD s/p CABG  - C/w ASA and statin  - No chest pain or EKG changes  - Trop unchanged 0.15.     Activity: AAT  GI: protonix  DVT ppx: Eliquis  Diet: DASH TLC CC  Dispo: from home, pending physiatry eval  Full Code

## 2019-09-16 NOTE — PROGRESS NOTE ADULT - SUBJECTIVE AND OBJECTIVE BOX
SUBJECTIVE:    Patient is a 59y old Male who presents with a chief complaint of pulmonary embolism (16 Sep 2019 06:16)    Currently admitted to medicine with the primary diagnosis of Chronic pulmonary embolism with acute cor pulmonale, unspecified pulmonary embolism type     Today is hospital day 3d. This morning he is resting comfortably in bed and reports no new issues or overnight events.     PAST MEDICAL & SURGICAL HISTORY  SLE (systemic lupus erythematosus)  Thyroid nodule  Sjogrens syndrome  Obesity  Hiatal hernia with GERD  Murmur  COPD (chronic obstructive pulmonary disease): post 911  Asthma: stable post 911  Obstructive sleep apnea on CPAP  Diabetes  HTN (hypertension)  Myocardial infarction: 2015  CAD (coronary artery disease) of bypass graft: 2015 x 3 vd  Arthritis: ra hands knees  History of surgery: 2015 x 3 v  History of surgery: 1990- rct sx    SOCIAL HISTORY:  Negative for smoking/alcohol/drug use.     ALLERGIES:  Mushrooms (Other (Severe))  No Known Drug Allergies    MEDICATIONS:  STANDING MEDICATIONS  ALBUTerol/ipratropium for Nebulization 3 milliLiter(s) Nebulizer every 4 hours  amLODIPine   Tablet 10 milliGRAM(s) Oral daily  apixaban 10 milliGRAM(s) Oral every 12 hours  aspirin enteric coated 81 milliGRAM(s) Oral daily  buDESOnide 160 MICROgram(s)/formoterol 4.5 MICROgram(s) Inhaler 2 Puff(s) Inhalation two times a day  chlorhexidine 4% Liquid 1 Application(s) Topical <User Schedule>  cloNIDine 0.2 milliGRAM(s) Oral three times a day  dextrose 5%. 1000 milliLiter(s) IV Continuous <Continuous>  dextrose 50% Injectable 12.5 Gram(s) IV Push once  dextrose 50% Injectable 25 Gram(s) IV Push once  dextrose 50% Injectable 25 Gram(s) IV Push once  furosemide    Tablet 40 milliGRAM(s) Oral two times a day  hydrALAZINE 25 milliGRAM(s) Oral three times a day  influenza   Vaccine 0.5 milliLiter(s) IntraMuscular once  insulin glargine Injectable (LANTUS) 30 Unit(s) SubCutaneous every morning  insulin glargine Injectable (LANTUS) 30 Unit(s) SubCutaneous at bedtime  insulin lispro (HumaLOG) corrective regimen sliding scale   SubCutaneous three times a day before meals  insulin lispro Injectable (HumaLOG) 15 Unit(s) SubCutaneous three times a day before meals  lisinopril 40 milliGRAM(s) Oral daily  metoprolol tartrate 25 milliGRAM(s) Oral two times a day  pantoprazole    Tablet 40 milliGRAM(s) Oral before breakfast  predniSONE   Tablet 40 milliGRAM(s) Oral daily    PRN MEDICATIONS  ALBUTerol/ipratropium for Nebulization 3 milliLiter(s) Nebulizer every 4 hours PRN  dextrose 40% Gel 15 Gram(s) Oral once PRN  glucagon  Injectable 1 milliGRAM(s) IntraMuscular once PRN  oxyCODONE    5 mG/acetaminophen 325 mG 1 Tablet(s) Oral every 6 hours PRN    VITALS:   T(F): 96.9  HR: 79  BP: 158/80  RR: 18  SpO2: 95%    LABS:                        13.6   11.73 )-----------( 217      ( 16 Sep 2019 06:28 )             42.3     09-16    138  |  98  |  29<H>  ----------------------------<  200<H>  3.9   |  27  |  1.1    Ca    8.7      16 Sep 2019 06:28  Mg     2.1     09-16    TPro  5.4<L>  /  Alb  3.1<L>  /  TBili  0.9  /  DBili  x   /  AST  18  /  ALT  35  /  AlkPhos  55  09-16                  RADIOLOGY:  < from: Xray Chest 1 View- PORTABLE-Routine (09.14.19 @ 04:52) >  No radiographicevidence of acute cardiopulmonary disease. Stable   cardiomegaly    < end of copied text >  Acute pulmonary emboli extending from the distal main pulmonary arteries   into the segmental pulmonary arteries of all pulmonary lobes.  No evidence of right heart strain.  Small peripheral opacity in the right lower and upper lobes measuring up   to 2.6 cm may reflect small pulmonary infarcts.  < from: [facility] Duplex Lower Ext Vein Scan, Bilat (09.12.19 @ 19:31) >    Deep venous thrombosis left popliteal and gastrocnemius veins.    No evidence of deep venous thrombosis or superficial thrombophlebitis in   right lower extremity.    < end of copied text >      PHYSICAL EXAM:  GEN: No acute distress  LUNGS: Clear to auscultation bilaterally   HEART: S1/S2 present. RRR.   ABD: Soft, non-tender, non-distended. Bowel sounds present  EXT: b/l edema, right leg red, inc swelling, tender and warm  NEURO: AAOX3

## 2019-09-16 NOTE — PROGRESS NOTE ADULT - SUBJECTIVE AND OBJECTIVE BOX
HPI:  60 yo male PMHx HTN, CAD s/p CABG x3 2015, COPD, jameson's esophagus, reactive airway disease, DM2 presented for evaluation of leg swelling and pain and shortness of breath. He reports that he's had chronic leg swelling which got worse a week ago, and yesterday was prescribed PO steroid taper by his Dr for SOB. He had an ankle fracture since March and was in a cast walked using crutches, with decreased ambulation and prolonged rercumbant durations. He denies chest pain, cough, palpitation, abdominal pain, n/v/d.    In ED EKG LL dupplex showed DVTs (pending official read), lab work showed positive troponin 0.15 and elevated BNP 5000. CT abdomen/pelvis showed b/l acute segmental and subsegmental PE and right main PA pulmonary emboli. O2 97% RA (13 Sep 2019 03:38)      T(C): 35.7 (09-16-19 @ 13:14), Max: 36.1 (09-16-19 @ 05:51)  HR: 94 (09-16-19 @ 13:14) (79 - 94)  BP: 185/82 (09-16-19 @ 13:14) (158/70 - 200/93)  RR: 18 (09-16-19 @ 13:14) (18 - 18)  SpO2: 95% (09-15-19 @ 21:28) (95% - 95%)    MOTOR EXAM 5/5      Physical Medicine And Rehabilitation Services are not indicated in this patient for the following Reason(s):    [    ] Patient is medically unstable      [    ]  Patient does not have appropriate activity orders      [     ] Patient has no weight bearing status for:      [ x    ] Patient is independently ambulating WITH CANE      [     ]  Patient is from Skilled Nursing Facility and is appropriate to return      [     ] Patient was non-ambulatory prior to admission      [     ]  Other      WILL CANCEL PM&R / PT request DC HOME ONCE STABLE

## 2019-09-17 NOTE — DISCHARGE NOTE PROVIDER - HOSPITAL COURSE
58 yo male PMHx HTN, CAD s/p CABG x3 2015, COPD, jameson's esophagus, reactive airway disease, DM2 presented for evaluation of leg swelling and pain and shortness of breath. He reports that he's had chronic leg swelling which got worse a week ago, and yesterday was prescribed PO steroid taper by his Dr for SOB. He had an ankle fracture since March and was in a cast walked using crutches, with decreased ambulation and prolonged rercumbant durations. He denies chest pain, cough, palpitation, abdominal pain, n/v/d.        In ED EKG LL dupplex showed DVTs (pending official read), lab work showed positive troponin 0.15 and elevated BNP 5000. CT abdomen/pelvis showed b/l acute segmental and subsegmental PE and right main PA pulmonary emboli. O2 97% RA        CTA showed bilateral PE and LL duplex showed DVTs. Pt was admitted to [facility] and started on AC.        Throughout MICU stay, pt remained stable, in no distress. Was never SOB, O2 stat always normal. Has LE edema, being treated with Lasix 40mg BID. Pt switched from Heparin to Eliquis, IV insulin to SubQ, IV steroid to oral steroid. Did not receive any oral steroid dose yet in MICU.         Pt transferred to floor fr monitoring before discharge. Will d/c patient on oral eliquis 5mg q12 for 1 month and out patient f/u with PCP. Patient also has uncontolled DM and HTN. 60 yo male PMHx HTN, CAD s/p CABG x3 2015, COPD, jameson's esophagus, reactive airway disease, DM2 presented for evaluation of leg swelling and pain and shortness of breath. He reports that he's had chronic leg swelling which got worse a week ago, and yesterday was prescribed PO steroid taper by his Dr for SOB. He had an ankle fracture since March and was in a cast walked using crutches, with decreased ambulation and prolonged rercumbant durations. He denies chest pain, cough, palpitation, abdominal pain, n/v/d.        In ED EKG LL dupplex showed DVTs (pending official read), lab work showed positive troponin 0.15 and elevated BNP 5000. CT abdomen/pelvis showed b/l acute segmental and subsegmental PE and right main PA pulmonary emboli. O2 97% RA        CTA showed bilateral PE and LL duplex showed DVTs. Pt was admitted to [facility] and started on AC.        Throughout MICU stay, pt remained stable, in no distress. Was never SOB, O2 stat always normal. Has LE edema, being treated with Lasix 40mg BID. Pt switched from Heparin to Eliquis, IV insulin to SubQ, IV steroid to oral steroid. Did not receive any oral steroid dose yet in MICU.         Pt transferred to floor fr monitoring before discharge. Will d/c patient on oral eliquis 5mg q12 for 1 month and out patient f/u with PCP. Patient also has uncontolled DM and HTN. Pt is on 5 home medications for HTN, increased dose of hydralazin from 25 to 100mg q8 during hospital stay. Will monitor and d/c with out patient follow up with PMD and nephrologist 60 yo male PMHx HTN, CAD s/p CABG x3 2015, COPD, jameson's esophagus, reactive airway disease, DM2 presented for evaluation of leg swelling and pain and shortness of breath. He reports that he's had chronic leg swelling which got worse a week ago, and yesterday was prescribed PO steroid taper by his Dr for SOB. He had an ankle fracture since March and was in a cast walked using crutches, with decreased ambulation and prolonged rercumbant durations. He denies chest pain, cough, palpitation, abdominal pain, n/v/d.        In ED EKG LL dupplex showed DVTs (pending official read), lab work showed positive troponin 0.15 and elevated BNP 5000. CT abdomen/pelvis showed b/l acute segmental and subsegmental PE and right main PA pulmonary emboli. O2 97% RA        CTA showed bilateral PE and LL duplex showed DVTs. Pt was admitted to [facility] and started on AC.        Throughout MICU stay, pt remained stable, in no distress. Was never SOB, O2 stat always normal. Has LE edema, being treated with Lasix 40mg BID. Pt switched from Heparin to Eliquis, IV insulin to SubQ, IV steroid to oral steroid. Did not receive any oral steroid dose yet in MICU.         Pt transferred to floor fr monitoring before discharge. Will d/c patient on oral eliquis 5mg q12 for 1 month and out patient f/u with PCP. Patient also has uncontolled DM and HTN. Pt is on 5 home medications for HTN, increased dose of hydralazin from 25 to 100mg q8 during hospital stay. Amlodipine changed to nifedipine 50mg q24. Will monitor and d/c with out patient follow up with PMD and nephrologist

## 2019-09-17 NOTE — PROGRESS NOTE ADULT - SUBJECTIVE AND OBJECTIVE BOX
SUBJECTIVE:    Patient is a 59y old Male who presents with a chief complaint of pulmonary embolism (17 Sep 2019 06:29)    Currently admitted to medicine with the primary diagnosis of Chronic pulmonary embolism with acute cor pulmonale, unspecified pulmonary embolism type     Today is hospital day 4d. This morning he is resting comfortably in bed and reports no new issues or overnight events.     PAST MEDICAL & SURGICAL HISTORY  SLE (systemic lupus erythematosus)  Thyroid nodule  Sjogrens syndrome  Obesity  Hiatal hernia with GERD  Murmur  COPD (chronic obstructive pulmonary disease): post 911  Asthma: stable post 911  Obstructive sleep apnea on CPAP  Diabetes  HTN (hypertension)  Myocardial infarction: 2015  CAD (coronary artery disease) of bypass graft: 2015 x 3 vd  Arthritis: ra hands knees  History of surgery: 2015 x 3 v  History of surgery: 1990- rct sx    SOCIAL HISTORY:  Negative for smoking/alcohol/drug use.     ALLERGIES:  Mushrooms (Other (Severe))  No Known Drug Allergies    MEDICATIONS:  STANDING MEDICATIONS  ALBUTerol/ipratropium for Nebulization 3 milliLiter(s) Nebulizer every 4 hours  amLODIPine   Tablet 10 milliGRAM(s) Oral daily  apixaban 10 milliGRAM(s) Oral every 12 hours  aspirin enteric coated 81 milliGRAM(s) Oral daily  buDESOnide 160 MICROgram(s)/formoterol 4.5 MICROgram(s) Inhaler 2 Puff(s) Inhalation two times a day  chlorhexidine 4% Liquid 1 Application(s) Topical <User Schedule>  cloNIDine 0.2 milliGRAM(s) Oral three times a day  dextrose 5%. 1000 milliLiter(s) IV Continuous <Continuous>  dextrose 50% Injectable 12.5 Gram(s) IV Push once  dextrose 50% Injectable 25 Gram(s) IV Push once  dextrose 50% Injectable 25 Gram(s) IV Push once  furosemide    Tablet 40 milliGRAM(s) Oral two times a day  hydrALAZINE 100 milliGRAM(s) Oral every 8 hours  influenza   Vaccine 0.5 milliLiter(s) IntraMuscular once  insulin glargine Injectable (LANTUS) 30 Unit(s) SubCutaneous every morning  insulin glargine Injectable (LANTUS) 30 Unit(s) SubCutaneous at bedtime  insulin lispro (HumaLOG) corrective regimen sliding scale   SubCutaneous three times a day before meals  insulin lispro Injectable (HumaLOG) 15 Unit(s) SubCutaneous three times a day before meals  lisinopril 40 milliGRAM(s) Oral daily  metoprolol tartrate 25 milliGRAM(s) Oral two times a day  pantoprazole    Tablet 40 milliGRAM(s) Oral before breakfast  predniSONE   Tablet 40 milliGRAM(s) Oral daily    PRN MEDICATIONS  ALBUTerol/ipratropium for Nebulization 3 milliLiter(s) Nebulizer every 4 hours PRN  dextrose 40% Gel 15 Gram(s) Oral once PRN  glucagon  Injectable 1 milliGRAM(s) IntraMuscular once PRN  oxyCODONE    5 mG/acetaminophen 325 mG 1 Tablet(s) Oral every 6 hours PRN    VITALS:   T(F): 96.6  HR: 84  BP: 174/82  RR: 18  SpO2: 99%    LABS:                        13.8   11.35 )-----------( 225      ( 17 Sep 2019 06:09 )             42.9     09-17    139  |  99  |  30<H>  ----------------------------<  154<H>  4.0   |  27  |  1.2    Ca    8.9      17 Sep 2019 06:09  Mg     2.1     09-16    TPro  5.5<L>  /  Alb  3.2<L>  /  TBili  0.9  /  DBili  x   /  AST  21  /  ALT  33  /  AlkPhos  60  09-17                  RADIOLOGY:  no radiology in past 24 hours.  PHYSICAL EXAM:  GEN: No acute distress  LUNGS: Clear to auscultation bilaterally   HEART: S1/S2 present. RRR.   ABD: Soft, non-tender, non-distended. Bowel sounds present  EXT: NC/NC/NE/2+PP/TRACY  NEURO: AAOX3 SUBJECTIVE:    Patient is a 59y old Male who presents with a chief complaint of pulmonary embolism (17 Sep 2019 06:29)    Currently admitted to medicine with the primary diagnosis of Chronic pulmonary embolism with acute cor pulmonale, unspecified pulmonary embolism type     Today is hospital day 4d. This morning he is resting comfortably in bed and reports no new issues or overnight events. Pt c/o right leg pain, O/E red leg has erythema, is warm and pt reports tenderness to palpation. Pt is ambulatory, reports no issues with urination or passing BM.     PAST MEDICAL & SURGICAL HISTORY  SLE (systemic lupus erythematosus)  Thyroid nodule  Sjogrens syndrome  Obesity  Hiatal hernia with GERD  Murmur  COPD (chronic obstructive pulmonary disease): post 911  Asthma: stable post 911  Obstructive sleep apnea on CPAP  Diabetes  HTN (hypertension)  Myocardial infarction: 2015  CAD (coronary artery disease) of bypass graft: 2015 x 3 vd  Arthritis: ra hands knees  History of surgery: 2015 x 3 v  History of surgery: 1990- rct sx    SOCIAL HISTORY:  Negative for smoking/alcohol/drug use.     ALLERGIES:  Mushrooms (Other (Severe))  No Known Drug Allergies    MEDICATIONS:  STANDING MEDICATIONS  ALBUTerol/ipratropium for Nebulization 3 milliLiter(s) Nebulizer every 4 hours  amLODIPine   Tablet 10 milliGRAM(s) Oral daily  apixaban 10 milliGRAM(s) Oral every 12 hours  aspirin enteric coated 81 milliGRAM(s) Oral daily  buDESOnide 160 MICROgram(s)/formoterol 4.5 MICROgram(s) Inhaler 2 Puff(s) Inhalation two times a day  chlorhexidine 4% Liquid 1 Application(s) Topical <User Schedule>  cloNIDine 0.2 milliGRAM(s) Oral three times a day  dextrose 5%. 1000 milliLiter(s) IV Continuous <Continuous>  dextrose 50% Injectable 12.5 Gram(s) IV Push once  dextrose 50% Injectable 25 Gram(s) IV Push once  dextrose 50% Injectable 25 Gram(s) IV Push once  furosemide    Tablet 40 milliGRAM(s) Oral two times a day  hydrALAZINE 100 milliGRAM(s) Oral every 8 hours  influenza   Vaccine 0.5 milliLiter(s) IntraMuscular once  insulin glargine Injectable (LANTUS) 30 Unit(s) SubCutaneous every morning  insulin glargine Injectable (LANTUS) 30 Unit(s) SubCutaneous at bedtime  insulin lispro (HumaLOG) corrective regimen sliding scale   SubCutaneous three times a day before meals  insulin lispro Injectable (HumaLOG) 15 Unit(s) SubCutaneous three times a day before meals  lisinopril 40 milliGRAM(s) Oral daily  metoprolol tartrate 25 milliGRAM(s) Oral two times a day  pantoprazole    Tablet 40 milliGRAM(s) Oral before breakfast  predniSONE   Tablet 40 milliGRAM(s) Oral daily    PRN MEDICATIONS  ALBUTerol/ipratropium for Nebulization 3 milliLiter(s) Nebulizer every 4 hours PRN  dextrose 40% Gel 15 Gram(s) Oral once PRN  glucagon  Injectable 1 milliGRAM(s) IntraMuscular once PRN  oxyCODONE    5 mG/acetaminophen 325 mG 1 Tablet(s) Oral every 6 hours PRN    VITALS:   T(F): 96.6  HR: 84  BP: 174/82  RR: 18  SpO2: 99%    LABS:                        13.8   11.35 )-----------( 225      ( 17 Sep 2019 06:09 )             42.9     09-17    139  |  99  |  30<H>  ----------------------------<  154<H>  4.0   |  27  |  1.2    Ca    8.9      17 Sep 2019 06:09  Mg     2.1     09-16    TPro  5.5<L>  /  Alb  3.2<L>  /  TBili  0.9  /  DBili  x   /  AST  21  /  ALT  33  /  AlkPhos  60  09-17                  RADIOLOGY:  no radiology in past 24 hours.  PHYSICAL EXAM:  GEN: No acute distress  LUNGS: Clear to auscultation bilaterally   HEART: S1/S2 present. RRR.   ABD: Soft, non-tender, non-distended. Bowel sounds present  EXT: NC/NC/NE/2+PP/TRACY, redness, warmth on right leg.  NEURO: AAOX3

## 2019-09-17 NOTE — DISCHARGE NOTE PROVIDER - NSDCFUSCHEDAPPT_GEN_ALL_CORE_FT
MATHEW MAJANO ; 09/19/2019 ; [facility] [address] MATHEW MAJANO ; 09/19/2019 ; [facility] [address]

## 2019-09-17 NOTE — PROGRESS NOTE ADULT - ASSESSMENT
60 yo male PMHx HTN, CAD s/p CABG x3 2015, COPD, Nixon's esophagus, reactive airway disease, DM2 presented for evaluation of leg swelling and pain and shortness of breath. Manged in ICU for bilateral PE and LLE DVT and downgraded to medical floor 09/16.    1. LLE DVT c/b Bilateral PE: On Eliquis. No radiologic evidence of Rt heart strain on CT and also, Echo. Check ambulating pulse ox prior to DC  2. DM2: Cont current regimen, lantus 30 BID, Lispro TIDAC, ISS  3. HTN: On hydralazine, clonidine, amlodipine, lisinopril, and metoprolol  4. COPD: c/w current inhalers, duonebs PRB  5. CAD s/p CABG: ASA/Statin  6, Bilateral LE edema: Echo 09/13 is wnl; Edema possibly due to venous insufficiency. Will diuresis with Lasix 40mg IV BID    GI/DVT PPX    #Progress Note Handoff  Pending (specify): Discharge Planning  Family discussion: Discussed with patient regarding DC  Disposition: Home 60 yo male PMHx HTN, CAD s/p CABG x3 2015, COPD, Nixon's esophagus, reactive airway disease, DM2 presented for evaluation of leg swelling and pain and shortness of breath. Manged in ICU for bilateral PE and LLE DVT and downgraded to medical floor 09/16.    1. LLE DVT c/b Bilateral PE: On Eliquis. No radiologic evidence of Rt heart strain on CT and also, Echo. Check ambulating pulse ox prior to DC  2. DM2: Cont current regimen, lantus 30 BID, Lispro TIDAC, ISS  3. HTN: On hydralazine, clonidine, amlodipine, lisinopril, and metoprolol  4. COPD: c/w current inhalers, duonebs PRB  5. CAD s/p CABG: ASA/Statin  6, Bilateral LE edema: Echo 09/13 is wnl; Edema possibly due to venous insufficiency. Will diuresis with Lasix 40mg IV BID    GI/DVT PPX    #Progress Note Handoff  Pending (specify): IV Lasix diuresis  Family discussion: Discussed with patient regarding DC  Disposition: Home 58 yo male PMHx HTN, CAD s/p CABG x3 2015, COPD, Nixon's esophagus, reactive airway disease, DM2 presented for evaluation of leg swelling and pain and shortness of breath. Manged in ICU for bilateral PE and LLE DVT and downgraded to medical floor 09/16.    1. LLE DVT c/b Bilateral PE: On Eliquis. No radiologic evidence of Rt heart strain on CT and also, Echo. Check ambulating pulse ox prior to DC  2. DM2: Cont current regimen, lantus 30 BID, Lispro TIDAC, ISS  3. HTN: On hydralazine, clonidine, amlodipine, lisinopril, and metoprolol  4. COPD: c/w current inhalers, duonebs PRB  5. CAD s/p CABG: ASA/Statin  6, Bilateral LE edema: Echo 09/13 is wnl; Edema possibly due to venous insufficiency. Will diuresis with Lasix 40mg IV BID    GI/DVT PPX    #Progress Note Handoff  Pending (specify): IV Lasix diuresis  Family discussion: Discussed with patient regarding DC after leg swelling improves  Disposition: Home

## 2019-09-17 NOTE — DISCHARGE NOTE PROVIDER - CARE PROVIDER_API CALL
Isis Robertson)  Family Medicine  [address] [place] [address]  Phone: [phone]  Fax: [phone]  Follow Up Time:     Kleiner, Morton J (MD)  Internal Medicine; Nephrology  [address] [place] [id number]  Phone: [phone]  Fax: [phone]  Follow Up Time:

## 2019-09-17 NOTE — PROGRESS NOTE ADULT - ASSESSMENT
Patient is a 59y old Male who presented with SOB and bilateral LE edema worse than usual. Pt has a hx of HTN, CAD s/p CABG x3, COPD, jameson's esophagus, reactive airway disease, DM2. Had an ankle injury in March and has been ambulating less since then, but is still ambulating with cane.   CTA showed bilateral PE and LL duplex showed DVTs.    Throughout MICU stay, pt remained stable, in no distress. Was never SOB, O2 stat always normal. Has LE edema, being treated with Lasix 40mg BID. Pt switched from Heparin to Eliquis, IV insulin to SubQ, IV steroid to oral steroid. Did not receive any oral steroid dose yet in MICU.     # Acute submassive b/l pulmonary embolism, LLE DVT  - Eliquis 10mg BID q12h for 7 days (till 9/20)  - Repeat trop unchanged at 0.15, BNP 5000  - no radiologic evidence of rt heart strain on CT  - Echo normal  - Lasix 40mg PO BID    # DM2  - Insulin basal-bolus, FS  - Was not compliant on home diabetes meds due to price. Needs new regimen for discharge.     # HTN  - Continue with hydralazine, clonidine, amlodipine, lisinopril, metoprolol    # COPD  - C/w nebs, inhalers, solumedrol   - 40 mg prednisone, will taper    # Hypokalemia  - resolved, s/p repletion  - will monitor    # CAD s/p CABG  - C/w ASA and statin  - No chest pain or EKG changes  - Trop unchanged 0.15.     Activity: AAT  GI: protonix  DVT ppx: Eliquis  Diet: DASH TLC CC  Dispo: from home, pending physiatry eval  Full Code Patient is a 59y old Male who presented with SOB and bilateral LE edema worse than usual. Pt has a hx of HTN, CAD s/p CABG x3, COPD, jameson's esophagus, reactive airway disease, DM2. Had an ankle injury in March and has been ambulating less since then, but is still ambulating with cane.   CTA showed bilateral PE and LL duplex showed DVTs.    Throughout MICU stay, pt remained stable, in no distress. Was never SOB, O2 stat always normal. Has LE edema, being treated with Lasix 40mg BID. Pt switched from Heparin to Eliquis, IV insulin to SubQ, IV steroid to oral steroid. Did not receive any oral steroid dose yet in MICU.     # Acute submassive b/l pulmonary embolism, LLE DVT  - Eliquis 10mg BID q12h for 7 days (till 9/20)  - Repeat trop unchanged at 0.15, BNP 5000  - no radiologic evidence of rt heart strain on CT  - Echo normal  - Lasix 40mg PO BID    # DM2  - Insulin basal-bolus, FS  - Was not compliant on home diabetes meds due to price. Needs new regimen for discharge.     # HTN  - Continue with hydralazine, clonidine, amlodipine, lisinopril, metoprolol    # COPD  - C/w nebs, inhalers, solumedrol   - 40 mg prednisone, will taper    # Hypokalemia  - resolved, s/p repletion  - will monitor    # CAD s/p CABG  - C/w ASA and statin  - No chest pain or EKG changes  - Trop unchanged 0.15.     Activity: AAT  GI: protonix  DVT ppx: Eliquis  Diet: DASH TLC CC  Dispo: home  Full Code Patient is a 59y old Male who presented with SOB and bilateral LE edema worse than usual. Pt has a hx of HTN, CAD s/p CABG x3, COPD, jameson's esophagus, reactive airway disease, DM2. Had an ankle injury in March and has been ambulating less since then, but is still ambulating with cane.   CTA showed bilateral PE and LL duplex showed DVTs.    Throughout MICU stay, pt remained stable, in no distress. Was never SOB, O2 stat always normal. Has LE edema, being treated with Lasix 40mg BID. Pt switched from Heparin to Eliquis, IV insulin to SubQ, IV steroid to oral steroid. Did not receive any oral steroid dose yet in MICU.     # Acute submassive b/l pulmonary embolism, LLE DVT  - Eliquis 10mg BID q12h for 7 days (till 9/20)  - Repeat trop unchanged at 0.15, BNP 5000  - no radiologic evidence of rt heart strain on CT  - Echo normal  - Lasix 40mg PO BID  - will d/c pt with out patient f/u as t stable and asymptomatic    # DM2  - Insulin basal-bolus, FS  - Was not compliant on home diabetes meds due to price. Needs new regimen for discharge.     # HTN  - uncontrolled HTN  - Continue with hydralazine, clonidine, amlodipine, lisinopril, metoprolol  - will monitor, optimize and d/c    # COPD  - C/w nebs, inhalers, solumedrol   - 40 mg prednisone tapered to 20mg today, will taper    # Hypokalemia  - resolved, s/p repletion  - will monitor    # CAD s/p CABG  - C/w ASA and statin  - No chest pain or EKG changes  - Trop unchanged 0.15.     Activity: AAT  GI: protonix  DVT ppx: Eliquis  Diet: DASH TLC CC  Dispo: home  Full Code Patient is a 59y old Male who presented with SOB and bilateral LE edema worse than usual. Pt has a hx of HTN, CAD s/p CABG x3, COPD, jameson's esophagus, reactive airway disease, DM2. Had an ankle injury in March and has been ambulating less since then, but is still ambulating with cane.   CTA showed bilateral PE and LL duplex showed DVTs.    Throughout MICU stay, pt remained stable, in no distress. Was never SOB, O2 stat always normal. Has LE edema, being treated with Lasix 40mg BID. Pt switched from Heparin to Eliquis, IV insulin to SubQ, IV steroid to oral steroid. Did not receive any oral steroid dose yet in MICU.     # Acute submassive b/l pulmonary embolism, LLE DVT  - Eliquis 10mg BID q12h for 7 days (till 9/20)  - Repeat trop unchanged at 0.15, BNP 5000  - no radiologic evidence of rt heart strain on CT  - Echo normal  - Lasix 40mg PO BID  - will d/c pt with out patient f/u as pt is stable and asymptomatic    # DM2  - Insulin basal-bolus, FS  - Was not compliant on home diabetes meds due to price. Needs new regimen for discharge.     # HTN  - uncontrolled HTN  - Continue with hydralazine, clonidine, amlodipine, lisinopril, metoprolol  - will monitor, optimize and d/c    # COPD  - C/w nebs, inhalers, solumedrol   - 40 mg prednisone tapered to 20mg today, will taper    # Hypokalemia  - resolved, s/p repletion  - will monitor    # CAD s/p CABG  - C/w ASA and statin  - No chest pain or EKG changes  - Trop unchanged 0.15.     Activity: AAT  GI: protonix  DVT ppx: Eliquis  Diet: DASH TLC CC  Dispo: home  Full Code Patient is a 59y old Male who presented with SOB and bilateral LE edema worse than usual. Pt has a hx of HTN, CAD s/p CABG x3, COPD, jameson's esophagus, reactive airway disease, DM2. Had an ankle injury in March and has been ambulating less since then, but is still ambulating with cane.   CTA showed bilateral PE and LL duplex showed DVTs.    Throughout MICU stay, pt remained stable, in no distress. Was never SOB, O2 stat always normal. Has LE edema, being treated with Lasix 40mg BID. Pt switched from Heparin to Eliquis, IV insulin to SubQ, IV steroid to oral steroid. Did not receive any oral steroid dose yet in MICU.     # Acute submassive b/l pulmonary embolism, LLE DVT  - Eliquis 10mg BID q12h for 7 days (till 9/20)  - Repeat trop unchanged at 0.15, BNP 5000  - no radiologic evidence of rt heart strain on CT  - Echo normal  - Lasix 40mg IV BID  - will d/c pt with out patient f/u as pt is stable and asymptomatic    # DM2  - Insulin basal-bolus, FS  - Was not compliant on home diabetes meds due to price. Needs new regimen for discharge.     # HTN  - uncontrolled HTN  - Continue with hydralazine, clonidine, amlodipine, lisinopril, metoprolol  - will monitor, optimize and d/c    # COPD  - C/w nebs, inhalers, solumedrol   - 40 mg prednisone tapered to 20mg today, will taper    # Hypokalemia  - resolved, s/p repletion  - will monitor    # CAD s/p CABG  - C/w ASA and statin  - No chest pain or EKG changes  - Trop unchanged 0.15.     Activity: AAT  GI: protonix  DVT ppx: Eliquis  Diet: DASH TLC CC  Dispo: home  Full Code

## 2019-09-17 NOTE — DISCHARGE NOTE PROVIDER - CARE PROVIDERS DIRECT ADDRESSES
[email or web address]

## 2019-09-17 NOTE — DISCHARGE NOTE PROVIDER - NSDCCPCAREPLAN_GEN_ALL_CORE_FT
PRINCIPAL DISCHARGE DIAGNOSIS  Diagnosis: Chronic pulmonary embolism with acute cor pulmonale, unspecified pulmonary embolism type  Assessment and Plan of Treatment: You came in with shortness of breath due to pulmonary embolism. We did imaging studies and found a clot blocking the arteries of your lungs. We treated you with anticoagulants. Kindly follow up with your PMD as out patient and be compliant with your medictaions.      SECONDARY DISCHARGE DIAGNOSES  Diagnosis: Hypertension  Assessment and Plan of Treatment: You have very high blood pressure. We monitored you and chagned the dosage of your antihypertensives. Kindly follow up with your PMD as out patient and also see a nephrologist. PRINCIPAL DISCHARGE DIAGNOSIS  Diagnosis: Chronic pulmonary embolism with acute cor pulmonale, unspecified pulmonary embolism type  Assessment and Plan of Treatment: You came in with shortness of breath due to pulmonary embolism. We did imaging studies and found a clot blocking the arteries of your lungs. We treated you with anticoagulants. Kindly follow up with your PMD as out patient and be compliant with your medictaions.      SECONDARY DISCHARGE DIAGNOSES  Diagnosis: COPD, severe  Assessment and Plan of Treatment: You have COPD, we tretaed you with IV and oral steriods for your exacerbation. Kindy follow up with your PMD as out patent and be compliant with your medications    Diagnosis: Hypertension  Assessment and Plan of Treatment: You have very high blood pressure. We monitored you and chagned the dosage of your antihypertensives. Kindly follow up with your PMD as out patient and also see a nephrologist.

## 2019-09-17 NOTE — PROGRESS NOTE ADULT - SUBJECTIVE AND OBJECTIVE BOX
MELECIOMATHEW  59y, Male  Allergy: Mushrooms (Other (Severe))  No Known Drug Allergies    Hospital Day: 4d    Patient seen and examined earlier today. Patient endorses pain in right leg    PMH/PSH:  PAST MEDICAL & SURGICAL HISTORY:  SLE (systemic lupus erythematosus)  Thyroid nodule  Sjogrens syndrome  Obesity  Hiatal hernia with GERD  Murmur  COPD (chronic obstructive pulmonary disease): post 911  Asthma: stable post 911  Obstructive sleep apnea on CPAP  Diabetes  HTN (hypertension)  Myocardial infarction: 2015  CAD (coronary artery disease) of bypass graft: 2015 x 3 vd  Arthritis: ra hands knees  History of surgery: 2015 x 3 v  History of surgery: 1990- rct sx    VITALS:  T(F): 96.2 (09-17-19 @ 13:17), Max: 96.8 (09-16-19 @ 21:00)  HR: 91 (09-17-19 @ 13:17)  BP: 160/88 (09-17-19 @ 13:17) (160/88 - 210/95)  RR: 18 (09-17-19 @ 13:17)  SpO2: 99% (09-16-19 @ 20:00)    TESTS & MEASUREMENTS:  Weight (Kg):   BMI (kg/m2): 45.8 (09-14)    09-15-19 @ 07:01  -  09-16-19 @ 07:00  --------------------------------------------------------  IN: 0 mL / OUT: 1650 mL / NET: -1650 mL    09-16-19 @ 07:01  -  09-17-19 @ 07:00  --------------------------------------------------------  IN: 0 mL / OUT: 500 mL / NET: -500 mL                       13.8   11.35 )-----------( 225      ( 17 Sep 2019 06:09 )             42.9     09-17    139  |  99  |  30<H>  ----------------------------<  154<H>  4.0   |  27  |  1.2    Ca    8.9      17 Sep 2019 06:09  Mg     2.1     09-16    TPro  5.5<L>  /  Alb  3.2<L>  /  TBili  0.9  /  DBili  x   /  AST  21  /  ALT  33  /  AlkPhos  60  09-17    LIVER FUNCTIONS - ( 17 Sep 2019 06:09 )  Alb: 3.2 g/dL / Pro: 5.5 g/dL / ALK PHOS: 60 U/L / ALT: 33 U/L / AST: 21 U/L / GGT: x           RECENT DIAGNOSTIC ORDERS:  Complete Blood Count + Automated Diff: AM Sched. Collection: 18-Sep-2019 04:30 (09-17-19 @ 15:18)  Comprehensive Metabolic Panel: AM Sched. Collection: 18-Sep-2019 04:30 (09-17-19 @ 15:18)    MEDICATIONS:  MEDICATIONS  (STANDING):  ALBUTerol/ipratropium for Nebulization 3 milliLiter(s) Nebulizer every 4 hours  amLODIPine   Tablet 10 milliGRAM(s) Oral daily  apixaban 10 milliGRAM(s) Oral every 12 hours  aspirin enteric coated 81 milliGRAM(s) Oral daily  buDESOnide 160 MICROgram(s)/formoterol 4.5 MICROgram(s) Inhaler 2 Puff(s) Inhalation two times a day  chlorhexidine 4% Liquid 1 Application(s) Topical <User Schedule>  cloNIDine 0.2 milliGRAM(s) Oral three times a day  dextrose 5%. 1000 milliLiter(s) (50 mL/Hr) IV Continuous <Continuous>  dextrose 50% Injectable 12.5 Gram(s) IV Push once  dextrose 50% Injectable 25 Gram(s) IV Push once  dextrose 50% Injectable 25 Gram(s) IV Push once  furosemide   Injectable 40 milliGRAM(s) IV Push every 12 hours  hydrALAZINE 100 milliGRAM(s) Oral every 8 hours  influenza   Vaccine 0.5 milliLiter(s) IntraMuscular once  insulin glargine Injectable (LANTUS) 30 Unit(s) SubCutaneous every morning  insulin glargine Injectable (LANTUS) 30 Unit(s) SubCutaneous at bedtime  insulin lispro (HumaLOG) corrective regimen sliding scale   SubCutaneous three times a day before meals  insulin lispro Injectable (HumaLOG) 15 Unit(s) SubCutaneous three times a day before meals  lisinopril 40 milliGRAM(s) Oral daily  metoprolol tartrate 25 milliGRAM(s) Oral two times a day  predniSONE   Tablet 20 milliGRAM(s) Oral daily    MEDICATIONS  (PRN):  ALBUTerol/ipratropium for Nebulization 3 milliLiter(s) Nebulizer every 4 hours PRN Shortness of Breath and/or Wheezing  dextrose 40% Gel 15 Gram(s) Oral once PRN Blood Glucose LESS THAN 70 milliGRAM(s)/deciliter  glucagon  Injectable 1 milliGRAM(s) IntraMuscular once PRN Glucose LESS THAN 70 milligrams/deciliter  oxyCODONE    5 mG/acetaminophen 325 mG 1 Tablet(s) Oral every 6 hours PRN Severe Pain (7 - 10)    HOME MEDICATIONS:  amLODIPine 10 mg oral tablet (05-15)  Aspir 81 oral delayed release tablet (05-15)  Bystolic 20 mg oral tablet (05-15)  cloNIDine 0.2 mg oral tablet (05-15)  hydrALAZINE 25 mg oral tablet (05-15)  Invokana 300 mg oral tablet (05-15)  metoprolol tartrate 25 mg oral tablet (05-15)  oxycodone-acetaminophen 5 mg-325 mg/5 mL oral solution (05-15)  Trulicity Pen (05-15)    REVIEW OF SYSTEMS:  All other review of systems is negative unless indicated above.     PHYSICAL EXAM:  GENERAL: A&O x3,  in NAD/P,   HEENT: No Swelling  CHEST/LUNG: Good air entry, No wheezing  HEART: RRR, No murmurs  ABDOMEN: Soft, Bowel sounds present  EXTREMITIES:  No clubbing, Bilateral LE 2+ Pitting edema; Erythema noted to right shin with mild tenderness to palpation

## 2019-09-18 NOTE — PROGRESS NOTE ADULT - SUBJECTIVE AND OBJECTIVE BOX
SUBJECTIVE:    Patient is a 59y old Male who presents with a chief complaint of pulmonary embolism (18 Sep 2019 11:04)    Currently admitted to medicine with the primary diagnosis of Chronic pulmonary embolism with acute cor pulmonale, unspecified pulmonary embolism type     Today is hospital day 5d. This morning he is resting comfortably in bed and reports no new issues or overnight events.     PAST MEDICAL & SURGICAL HISTORY  SLE (systemic lupus erythematosus)  Thyroid nodule  Sjogrens syndrome  Obesity  Hiatal hernia with GERD  Murmur  COPD (chronic obstructive pulmonary disease): post 911  Asthma: stable post 911  Obstructive sleep apnea on CPAP  Diabetes  HTN (hypertension)  Myocardial infarction: 2015  CAD (coronary artery disease) of bypass graft: 2015 x 3 vd  Arthritis: ra hands knees  History of surgery: 2015 x 3 v  History of surgery: 1990- rct sx    SOCIAL HISTORY:  Negative for smoking/alcohol/drug use.     ALLERGIES:  Mushrooms (Other (Severe))  No Known Drug Allergies    MEDICATIONS:  STANDING MEDICATIONS  ALBUTerol/ipratropium for Nebulization 3 milliLiter(s) Nebulizer every 4 hours  amLODIPine   Tablet 10 milliGRAM(s) Oral daily  apixaban 10 milliGRAM(s) Oral every 12 hours  aspirin enteric coated 81 milliGRAM(s) Oral daily  buDESOnide 160 MICROgram(s)/formoterol 4.5 MICROgram(s) Inhaler 2 Puff(s) Inhalation two times a day  chlorhexidine 4% Liquid 1 Application(s) Topical <User Schedule>  cloNIDine 0.2 milliGRAM(s) Oral three times a day  dextrose 5%. 1000 milliLiter(s) IV Continuous <Continuous>  dextrose 50% Injectable 12.5 Gram(s) IV Push once  dextrose 50% Injectable 25 Gram(s) IV Push once  dextrose 50% Injectable 25 Gram(s) IV Push once  furosemide   Injectable 40 milliGRAM(s) IV Push every 12 hours  hydrALAZINE 100 milliGRAM(s) Oral every 8 hours  influenza   Vaccine 0.5 milliLiter(s) IntraMuscular once  insulin glargine Injectable (LANTUS) 30 Unit(s) SubCutaneous every morning  insulin glargine Injectable (LANTUS) 30 Unit(s) SubCutaneous at bedtime  insulin lispro (HumaLOG) corrective regimen sliding scale   SubCutaneous three times a day before meals  insulin lispro Injectable (HumaLOG) 15 Unit(s) SubCutaneous three times a day before meals  lisinopril 40 milliGRAM(s) Oral daily  metoprolol tartrate 25 milliGRAM(s) Oral two times a day  predniSONE   Tablet 20 milliGRAM(s) Oral daily    PRN MEDICATIONS  ALBUTerol/ipratropium for Nebulization 3 milliLiter(s) Nebulizer every 4 hours PRN  dextrose 40% Gel 15 Gram(s) Oral once PRN  glucagon  Injectable 1 milliGRAM(s) IntraMuscular once PRN  oxyCODONE    5 mG/acetaminophen 325 mG 1 Tablet(s) Oral every 6 hours PRN    VITALS:   T(F): 96.5  HR: 72  BP: 137/69  RR: 18  SpO2: 99%    LABS:                        14.3   11.51 )-----------( 230      ( 18 Sep 2019 06:04 )             44.2     09-18    138  |  100  |  34<H>  ----------------------------<  152<H>  4.0   |  26  |  1.1    Ca    8.7      18 Sep 2019 06:04    TPro  5.7<L>  /  Alb  3.3<L>  /  TBili  0.7  /  DBili  x   /  AST  17  /  ALT  31  /  AlkPhos  53  09-18                  RADIOLOGY:  no radiology in past 24 hours.  PHYSICAL EXAM:  GEN: No acute distress  LUNGS: Clear to auscultation bilaterally   HEART: S1/S2 present. RRR.   ABD: Soft, non-tender, non-distended. Bowel sounds present  EXT: 5+ pitting edema b/l, redness on rt leg shin  NEURO: AAOX3

## 2019-09-18 NOTE — PROGRESS NOTE ADULT - ASSESSMENT
#Acute submassive b/l pulmonary embolism, LLE DVT  on Apixiban     #Bilateral lower extremity edema   woul refer to outpatient clinic ,   at the present time trial of lasix and recommend compression stockings    #HTN   much better controlled on ace, clonidine , lisinopril , and lasix  recommend outpatient follow up with PMD and Nephrology for Blood pressure management , and if persistent will need workup for secondary causes of htn ie pheo , hypercortisol, renal a stenosis      #CKD 2     #Hepatic steatosis    #Left renal cyst    #Morbid obesity patient needs to see dieitian outpatient for further evaluation     #RUBEN on cpap     #DM   CAPILLARY BLOOD GLUCOSE      POCT Blood Glucose.: 194 mg/dL (18 Sep 2019 07:50)  POCT Blood Glucose.: 244 mg/dL (17 Sep 2019 21:25)  POCT Blood Glucose.: 213 mg/dL (17 Sep 2019 18:19)  POCT Blood Glucose.: 240 mg/dL (17 Sep 2019 16:42)  POCT Blood Glucose.: 169 mg/dL (17 Sep 2019 11:29)  controlled    #COPD on prednisone     Progress Note Handoff    Pending:  DC HOME   Family discussion: patient is of sound verbalized understanding to paln of care    Disposition: Home___

## 2019-09-18 NOTE — PROGRESS NOTE ADULT - ASSESSMENT
Patient is a 59y old Male who presented with SOB and bilateral LE edema worse than usual. Pt has a hx of HTN, CAD s/p CABG x3, COPD, jameson's esophagus, reactive airway disease, DM2. Had an ankle injury in March and has been ambulating less since then, but is still ambulating with cane.   CTA showed bilateral PE and LL duplex showed DVTs.    Throughout MICU stay, pt remained stable, in no distress. Was never SOB, O2 stat always normal. Has LE edema, being treated with Lasix 40mg BID. Pt switched from Heparin to Eliquis, IV insulin to SubQ, IV steroid to oral steroid. Did not receive any oral steroid dose yet in MICU.     # Acute submassive b/l pulmonary embolism, LLE DVT  - Eliquis 10mg BID q12h for 7 days (till 9/20)  - Repeat trop unchanged at 0.15, BNP 5000  - no radiologic evidence of rt heart strain on CT  - Echo normal  - will d/c pt with out patient f/u as pt is stable and asymptomatic    # B/l LE Edema  - most likeley secondary to venous insufficiency  - pt started on lasix 40mg IV q12    # DM2  - Insulin basal-bolus, FS  - Was not compliant on home diabetes meds due to price. Needs new regimen for discharge.     # HTN  - uncontrolled HTN  - Continue with hydralazine, clonidine, amlodipine, lisinopril, metoprolol  - increased dose of hydralazine to 100 mg PO q8  - will monitor, optimize and d/c    # COPD  - C/w nebs, inhalers, solumedrol   - 40 mg prednisone tapered to 20mg today, will taper    # Hypokalemia  - resolved, s/p repletion  - will monitor    # CAD s/p CABG  - C/w ASA and statin  - No chest pain or EKG changes  - Trop unchanged 0.15.     Activity: AAT  GI: protonix  DVT ppx: Eliquis  Diet: DASH TLC CC  Dispo: home  Full Code Patient is a 59y old Male who presented with SOB and bilateral LE edema worse than usual. Pt has a hx of HTN, CAD s/p CABG x3, COPD, jameson's esophagus, reactive airway disease, DM2. Had an ankle injury in March and has been ambulating less since then, but is still ambulating with cane.   CTA showed bilateral PE and LL duplex showed DVTs.    Throughout MICU stay, pt remained stable, in no distress. Was never SOB, O2 stat always normal. Has LE edema, being treated with Lasix 40mg BID. Pt switched from Heparin to Eliquis, IV insulin to SubQ, IV steroid to oral steroid. Did not receive any oral steroid dose yet in MICU.     # Acute submassive b/l pulmonary embolism, LLE DVT  - Eliquis 10mg BID q12h for 7 days (till 9/20)  - Repeat trop unchanged at 0.15, BNP 5000  - no radiologic evidence of rt heart strain on CT  - Echo normal  - sent d/c medication to pharmacy for pricing, as Eliquis is expensive, might d/c on xarelto   - will d/c pt with out patient f/u as pt is stable and asymptomatic    # B/l LE Edema  - most likeley secondary to venous insufficiency  - pt started on lasix 40mg IV q12    # DM2  - Insulin basal-bolus, FS  - Was not compliant on home diabetes meds due to price. Needs new regimen for discharge.     # HTN  - uncontrolled HTN  - Continue with hydralazine, clonidine, amlodipine, lisinopril, metoprolol  - increased dose of hydralazine to 100 mg PO q8  - will monitor, optimize and d/c    # COPD  - C/w nebs, inhalers, solumedrol   - 40 mg prednisone tapered to 20mg 9/17, will taper to 10mg today     # Hypokalemia  - resolved, s/p repletion  - will monitor    # CAD s/p CABG  - C/w ASA and statin  - No chest pain or EKG changes  - Trop unchanged 0.15.     Activity: AAT  GI: protonix  DVT ppx: Eliquis  Diet: DASH TLC CC  Dispo: home  Full Code

## 2019-09-18 NOTE — PROGRESS NOTE ADULT - SUBJECTIVE AND OBJECTIVE BOX
MATHEW MAJANO  59y  [facility] [id number] 011 A      Patient is a 59y old  Male who presents with a chief complaint of pulmonary embolism (18 Sep 2019 07:11)      INTERVAL HPI/OVERNIGHT EVENTS:    no events overnight , bp is better managed today    REVIEW OF SYSTEMS:  CONSTITUTIONAL: No fever, weight loss, or fatigue  EYES: No eye pain, visual disturbances, or discharge  ENMT:  No difficulty hearing, tinnitus, vertigo; No sinus or throat pain  NECK: No pain or stiffness  BREASTS: No pain, masses, or nipple discharge  RESPIRATORY: No cough, wheezing, chills or hemoptysis; No shortness of breath  CARDIOVASCULAR: No chest pain, palpitations, dizziness, or leg swelling  GASTROINTESTINAL: No abdominal or epigastric pain. No nausea, vomiting, or hematemesis; No diarrhea or constipation. No melena or hematochezia.  GENITOURINARY: No dysuria, frequency, hematuria, or incontinence  NEUROLOGICAL: No headaches, memory loss, loss of strength, numbness, or tremors  SKIN: No itching, burning, rashes, or lesions   LYMPH NODES: No enlarged glands  ENDOCRINE: No heat or cold intolerance; No hair loss  MUSCULOSKELETA + Lower extremity swelling  PSYCHIATRIC: No depression, anxiety, mood swings, or difficulty sleeping  HEME/LYMPH: No easy bruising, or bleeding gums  ALLERY AND IMMUNOLOGIC: No hives or eczema  FAMILY HISTORY:  Diabetes (Father, Mother)  CAD (coronary artery disease) (Father, Mother)    T(C): 35.8 (09-18-19 @ 05:15), Max: 36.9 (09-17-19 @ 20:14)  HR: 72 (09-18-19 @ 06:30) (72 - 93)  BP: 137/69 (09-18-19 @ 06:30) (137/69 - 201/95)  RR: 18 (09-18-19 @ 05:15) (18 - 18)  SpO2: 99% (09-18-19 @ 05:42) (99% - 99%)  Wt(kg): --Vital Signs Last 24 Hrs  T(C): 35.8 (18 Sep 2019 05:15), Max: 36.9 (17 Sep 2019 20:14)  T(F): 96.5 (18 Sep 2019 05:15), Max: 98.5 (17 Sep 2019 20:14)  HR: 72 (18 Sep 2019 06:30) (72 - 93)  BP: 137/69 (18 Sep 2019 06:30) (137/69 - 201/95)  BP(mean): --  RR: 18 (18 Sep 2019 05:15) (18 - 18)  SpO2: 99% (18 Sep 2019 05:42) (99% - 99%)    PHYSICAL EXAM:  GENERAL: NAD, well-groomed, well-developed  HEAD:  Atraumatic, Normocephalic  EYES: EOMI, PERRLA, conjunctiva and sclera clear  ENMT: No tonsillar erythema, exudates, or enlargement; Moist mucous membranes, Good dentition, No lesions  NECK: Supple, No JVD, Normal thyroid  NERVOUS SYSTEM:  Alert & Oriented X3, Good concentration; Motor Strength 5/5 B/L upper and lower extremities; DTRs 2+ intact and symmetric  PULM: Clear to auscultation bilaterally  CARDIAC: Regular rate and rhythm; No murmurs, rubs, or gallops  GI: Soft, Nontender, Nondistended; Bowel sounds present  EXTREMITIES:  bilateral +2 edema with anterior erythema of right lower extremity  LYMPH: No lymphadenopathy noted    LABS:                            14.3   11.51 )-----------( 230      ( 18 Sep 2019 06:04 )             44.2   09-18    138  |  100  |  34<H>  ----------------------------<  152<H>  4.0   |  26  |  1.1    Ca    8.7      18 Sep 2019 06:04    TPro  5.7<L>  /  Alb  3.3<L>  /  TBili  0.7  /  DBili  x   /  AST  17  /  ALT  31  /  AlkPhos  53  09-18            ALBUTerol/ipratropium for Nebulization 3 milliLiter(s) Nebulizer every 4 hours  ALBUTerol/ipratropium for Nebulization 3 milliLiter(s) Nebulizer every 4 hours PRN  amLODIPine   Tablet 10 milliGRAM(s) Oral daily  apixaban 10 milliGRAM(s) Oral every 12 hours  aspirin enteric coated 81 milliGRAM(s) Oral daily  buDESOnide 160 MICROgram(s)/formoterol 4.5 MICROgram(s) Inhaler 2 Puff(s) Inhalation two times a day  chlorhexidine 4% Liquid 1 Application(s) Topical <User Schedule>  cloNIDine 0.2 milliGRAM(s) Oral three times a day  dextrose 40% Gel 15 Gram(s) Oral once PRN  dextrose 5%. 1000 milliLiter(s) IV Continuous <Continuous>  dextrose 50% Injectable 12.5 Gram(s) IV Push once  dextrose 50% Injectable 25 Gram(s) IV Push once  dextrose 50% Injectable 25 Gram(s) IV Push once  furosemide   Injectable 40 milliGRAM(s) IV Push every 12 hours  glucagon  Injectable 1 milliGRAM(s) IntraMuscular once PRN  hydrALAZINE 100 milliGRAM(s) Oral every 8 hours  influenza   Vaccine 0.5 milliLiter(s) IntraMuscular once  insulin glargine Injectable (LANTUS) 30 Unit(s) SubCutaneous every morning  insulin glargine Injectable (LANTUS) 30 Unit(s) SubCutaneous at bedtime  insulin lispro (HumaLOG) corrective regimen sliding scale   SubCutaneous three times a day before meals  insulin lispro Injectable (HumaLOG) 15 Unit(s) SubCutaneous three times a day before meals  lisinopril 40 milliGRAM(s) Oral daily  metoprolol tartrate 25 milliGRAM(s) Oral two times a day  oxyCODONE    5 mG/acetaminophen 325 mG 1 Tablet(s) Oral every 6 hours PRN  predniSONE   Tablet 20 milliGRAM(s) Oral daily      HEALTH ISSUES - PROBLEM Dx:  Thrombophlebitis of the femoral vein  Pulmonary thromboembolism          Case Discussed with House Staff   45 minutes spent on total encounter; more than 50% of the visit was spent counseling and/or coordinating care by the attending physician.   Spectra [phone]

## 2019-09-19 NOTE — PROGRESS NOTE ADULT - ASSESSMENT
Patient is a 59y old Male who presented with SOB and bilateral LE edema worse than usual. Pt has a hx of HTN, CAD s/p CABG x3, COPD, jameson's esophagus, reactive airway disease, DM2. Had an ankle injury in March and has been ambulating less since then, but is still ambulating with cane.   CTA showed bilateral PE and LL duplex showed DVTs.    Throughout MICU stay, pt remained stable, in no distress. Was never SOB, O2 stat always normal. Has LE edema, being treated with Lasix 40mg BID. Pt switched from Heparin to Eliquis, IV insulin to SubQ, IV steroid to oral steroid. Did not receive any oral steroid dose yet in MICU.     # Acute submassive b/l pulmonary embolism, LLE DVT  - Eliquis 10mg BID q12h for 7 days (till 9/20)  - Repeat trop unchanged at 0.15, BNP 5000  - no radiologic evidence of rt heart strain on CT  - Echo normal  - need long term a/c .  - will d/c pt with out patient f/u as pt is stable and asymptomatic    # B/l LE Edema  - most likeley secondary to venous insufficiency  - pt started on lasix 40mg IV q12    # DM2  - Insulin basal-bolus, FS  - Was not compliant on home diabetes meds due to price. Needs new regimen for discharge.   - d/c pt on insulin lispro, glargine.    # HTN  - uncontrolled HTN  - Continue with hydralazine, clonidine, nifedipine, lisinopril, metoprolol  - increased dose of hydralazine to 100 mg PO q8  - changed  amlodpine to nifedipine 60mg oral daily.  - will monitor, optimize and d/c    # COPD  - C/w nebs, inhalers, solumedrol   - 40 mg prednisone tapered to 20mg 9/17, will taper to 10mg 9/18, dc today 9/19    # Hypokalemia  - resolved, s/p repletion  - will monitor    # CAD s/p CABG  - C/w ASA and statin  - No chest pain or EKG changes  - Trop unchanged 0.15.     Activity: AAT  GI: protonix  DVT ppx: Eliquis  Diet: DASH TLC CC  Dispo: home  Full Code    #Progress Note Handoff  Pending (specify):  better BP control.   Family discussion: n/a , d/w the patient.   Disposition: Home_

## 2019-09-19 NOTE — PROGRESS NOTE ADULT - SUBJECTIVE AND OBJECTIVE BOX
SUBJECTIVE:    Patient is a 59y old Male who presents with a chief complaint of pulmonary embolism (19 Sep 2019 06:20)    Currently admitted to medicine with the primary diagnosis of Chronic pulmonary embolism with acute cor pulmonale, unspecified pulmonary embolism type     Today is hospital day 6d. This morning he is resting comfortably in bed and reports no new issues or overnight events. Patient still hypertensive    PAST MEDICAL & SURGICAL HISTORY  SLE (systemic lupus erythematosus)  Thyroid nodule  Sjogrens syndrome  Obesity  Hiatal hernia with GERD  Murmur  COPD (chronic obstructive pulmonary disease): post 911  Asthma: stable post 911  Obstructive sleep apnea on CPAP  Diabetes  HTN (hypertension)  Myocardial infarction: 2015  CAD (coronary artery disease) of bypass graft: 2015 x 3 vd  Arthritis: ra hands knees  History of surgery: 2015 x 3 v  History of surgery: 1990- rct sx    SOCIAL HISTORY:  Negative for smoking/alcohol/drug use.     ALLERGIES:  Mushrooms (Other (Severe))  No Known Drug Allergies    MEDICATIONS:  STANDING MEDICATIONS  ALBUTerol/ipratropium for Nebulization 3 milliLiter(s) Nebulizer every 4 hours  apixaban 10 milliGRAM(s) Oral every 12 hours  aspirin enteric coated 81 milliGRAM(s) Oral daily  buDESOnide 160 MICROgram(s)/formoterol 4.5 MICROgram(s) Inhaler 2 Puff(s) Inhalation two times a day  chlorhexidine 4% Liquid 1 Application(s) Topical <User Schedule>  cloNIDine 0.2 milliGRAM(s) Oral three times a day  dextrose 5%. 1000 milliLiter(s) IV Continuous <Continuous>  dextrose 50% Injectable 12.5 Gram(s) IV Push once  dextrose 50% Injectable 25 Gram(s) IV Push once  dextrose 50% Injectable 25 Gram(s) IV Push once  furosemide   Injectable 40 milliGRAM(s) IV Push every 12 hours  hydrALAZINE 100 milliGRAM(s) Oral every 8 hours  insulin glargine Injectable (LANTUS) 30 Unit(s) SubCutaneous every morning  insulin glargine Injectable (LANTUS) 30 Unit(s) SubCutaneous at bedtime  insulin lispro (HumaLOG) corrective regimen sliding scale   SubCutaneous three times a day before meals  insulin lispro Injectable (HumaLOG) 15 Unit(s) SubCutaneous three times a day before meals  lisinopril 40 milliGRAM(s) Oral daily  metoprolol tartrate 25 milliGRAM(s) Oral two times a day  NIFEdipine XL 60 milliGRAM(s) Oral daily    PRN MEDICATIONS  ALBUTerol/ipratropium for Nebulization 3 milliLiter(s) Nebulizer every 4 hours PRN  dextrose 40% Gel 15 Gram(s) Oral once PRN  glucagon  Injectable 1 milliGRAM(s) IntraMuscular once PRN  oxyCODONE    5 mG/acetaminophen 325 mG 1 Tablet(s) Oral every 6 hours PRN    VITALS:   T(F): 97.4  HR: 87  BP: 206/97  RR: 18  SpO2: --    LABS:                        14.0   12.42 )-----------( 253      ( 19 Sep 2019 06:26 )             42.9     09-19    138  |  100  |  33<H>  ----------------------------<  105<H>  3.6   |  26  |  1.1    Ca    8.8      19 Sep 2019 06:26    TPro  5.9<L>  /  Alb  3.3<L>  /  TBili  0.9  /  DBili  x   /  AST  18  /  ALT  33  /  AlkPhos  55  09-19                  RADIOLOGY:  no radiology in past 24 hours.  PHYSICAL EXAM:  GEN: No acute distress  LUNGS: Clear to auscultation bilaterally   HEART: S1/S2 present. RRR.   ABD: Soft, non-tender, non-distended. Bowel sounds present  EXT: NC/NC/NE/2+PP/TRACY  NEURO: AAOX3

## 2019-09-19 NOTE — PROGRESS NOTE ADULT - SUBJECTIVE AND OBJECTIVE BOX
MATHEW MAJANO  59y  Male      Patient is a 59y old  Male who presents with a chief complaint of pulmonary embolism (19 Sep 2019 06:20)      INTERVAL HPI/OVERNIGHT EVENTS:      ******************************* REVIEW OF SYSTEMS:**********************************************    resting in bed,   All other review of systems negative    *********************** VITALS ******************************************    T(F): 97.7 (09-19-19 @ 14:13)  HR: 94 (09-19-19 @ 14:13) (87 - 94)  BP: 183/84 (09-19-19 @ 14:13) (153/72 - 206/97)  RR: 18 (09-19-19 @ 14:13) (18 - 18)  SpO2: --            ******************************** PHYSICAL EXAM:**************************************************  GENERAL: NAD    PSYCH: no agitation, baseline mentation  HEENT:     NERVOUS SYSTEM:  Alert & Oriented X3, MS  5/5 B/L  UE and LE ; Sensory intact    PULMONARY: TABBY, CTA    CARDIOVASCULAR: S1S2 RRR    GI: Soft, NT, ND; BS present.    EXTREMITIES:  2+ Peripheral Pulses, No clubbing, cyanosis, or edema    LYMPH: No lymphadenopathy noted    SKIN: No rashes or lesions    ******************************************************************************************      **************************** LABS *******************************************************                          14.0   12.42 )-----------( 253      ( 19 Sep 2019 06:26 )             42.9     09-19    138  |  100  |  33<H>  ----------------------------<  105<H>  3.6   |  26  |  1.1    Ca    8.8      19 Sep 2019 06:26    TPro  5.9<L>  /  Alb  3.3<L>  /  TBili  0.9  /  DBili  x   /  AST  18  /  ALT  33  /  AlkPhos  55  09-19          Lactate Trend        CAPILLARY BLOOD GLUCOSE      POCT Blood Glucose.: 69 mg/dL (19 Sep 2019 12:06)          **************************Active Medications *******************************************  Mushrooms (Other (Severe))  No Known Drug Allergies      ALBUTerol/ipratropium for Nebulization 3 milliLiter(s) Nebulizer every 4 hours  ALBUTerol/ipratropium for Nebulization 3 milliLiter(s) Nebulizer every 4 hours PRN  apixaban 10 milliGRAM(s) Oral every 12 hours  aspirin enteric coated 81 milliGRAM(s) Oral daily  buDESOnide 160 MICROgram(s)/formoterol 4.5 MICROgram(s) Inhaler 2 Puff(s) Inhalation two times a day  chlorhexidine 4% Liquid 1 Application(s) Topical <User Schedule>  cloNIDine 0.2 milliGRAM(s) Oral three times a day  dextrose 40% Gel 15 Gram(s) Oral once PRN  dextrose 5%. 1000 milliLiter(s) IV Continuous <Continuous>  dextrose 50% Injectable 12.5 Gram(s) IV Push once  dextrose 50% Injectable 25 Gram(s) IV Push once  dextrose 50% Injectable 25 Gram(s) IV Push once  furosemide   Injectable 40 milliGRAM(s) IV Push every 12 hours  glucagon  Injectable 1 milliGRAM(s) IntraMuscular once PRN  hydrALAZINE 100 milliGRAM(s) Oral every 8 hours  insulin glargine Injectable (LANTUS) 30 Unit(s) SubCutaneous every morning  insulin glargine Injectable (LANTUS) 30 Unit(s) SubCutaneous at bedtime  insulin lispro (HumaLOG) corrective regimen sliding scale   SubCutaneous three times a day before meals  insulin lispro Injectable (HumaLOG) 15 Unit(s) SubCutaneous three times a day before meals  lisinopril 40 milliGRAM(s) Oral daily  metoprolol tartrate 25 milliGRAM(s) Oral two times a day  NIFEdipine XL 60 milliGRAM(s) Oral daily  oxyCODONE    5 mG/acetaminophen 325 mG 1 Tablet(s) Oral every 6 hours PRN      ***************************************************  RADIOLOGY & ADDITIONAL TESTS:    Imaging Personally Reviewed:  [ ] YES  [ ] NO    HEALTH ISSUES - PROBLEM Dx:  Thrombophlebitis of the femoral vein  Pulmonary thromboembolism

## 2019-09-19 NOTE — PROGRESS NOTE ADULT - ASSESSMENT
Patient is a 59y old Male who presented with SOB and bilateral LE edema worse than usual. Pt has a hx of HTN, CAD s/p CABG x3, COPD, jamseon's esophagus, reactive airway disease, DM2. Had an ankle injury in March and has been ambulating less since then, but is still ambulating with cane.   CTA showed bilateral PE and LL duplex showed DVTs.    Throughout MICU stay, pt remained stable, in no distress. Was never SOB, O2 stat always normal. Has LE edema, being treated with Lasix 40mg BID. Pt switched from Heparin to Eliquis, IV insulin to SubQ, IV steroid to oral steroid. Did not receive any oral steroid dose yet in MICU.     # Acute submassive b/l pulmonary embolism, LLE DVT  - Eliquis 10mg BID q12h for 7 days (till 9/20)  - Repeat trop unchanged at 0.15, BNP 5000  - no radiologic evidence of rt heart strain on CT  - Echo normal  - sent d/c medication to pharmacy for pricing, as Eliquis is expensive, might d/c on xarelto   - will d/c pt with out patient f/u as pt is stable and asymptomatic    # B/l LE Edema  - most likeley secondary to venous insufficiency  - pt started on lasix 40mg IV q12    # DM2  - Insulin basal-bolus, FS  - Was not compliant on home diabetes meds due to price. Needs new regimen for discharge.   - d/c pt on insulin lispro, glargine.    # HTN  - uncontrolled HTN  - Continue with hydralazine, clonidine, amlodipine, lisinopril, metoprolol  - increased dose of hydralazine to 100 mg PO q8  - will monitor, optimize and d/c    # COPD  - C/w nebs, inhalers, solumedrol   - 40 mg prednisone tapered to 20mg 9/17, will taper to 10mg today     # Hypokalemia  - resolved, s/p repletion  - will monitor    # CAD s/p CABG  - C/w ASA and statin  - No chest pain or EKG changes  - Trop unchanged 0.15.     Activity: AAT  GI: protonix  DVT ppx: Eliquis  Diet: DASH TLC CC  Dispo: home  Full Code Patient is a 59y old Male who presented with SOB and bilateral LE edema worse than usual. Pt has a hx of HTN, CAD s/p CABG x3, COPD, jameson's esophagus, reactive airway disease, DM2. Had an ankle injury in March and has been ambulating less since then, but is still ambulating with cane.   CTA showed bilateral PE and LL duplex showed DVTs.    Throughout MICU stay, pt remained stable, in no distress. Was never SOB, O2 stat always normal. Has LE edema, being treated with Lasix 40mg BID. Pt switched from Heparin to Eliquis, IV insulin to SubQ, IV steroid to oral steroid. Did not receive any oral steroid dose yet in MICU.     # Acute submassive b/l pulmonary embolism, LLE DVT  - Eliquis 10mg BID q12h for 7 days (till 9/20)  - Repeat trop unchanged at 0.15, BNP 5000  - no radiologic evidence of rt heart strain on CT  - Echo normal  - sent d/c medication to pharmacy for pricing, as Eliquis is expensive, might d/c on Xarelto   - will d/c pt with out patient f/u as pt is stable and asymptomatic    # B/l LE Edema  - most likeley secondary to venous insufficiency  - pt started on lasix 40mg IV q12    # DM2  - Insulin basal-bolus, FS  - Was not compliant on home diabetes meds due to price. Needs new regimen for discharge.   - d/c pt on insulin lispro, glargine.    # HTN  - uncontrolled HTN  - Continue with hydralazine, clonidine, nidedipine, lisinopril, metoprolol  - increased dose of hydralazine to 100 mg PO q8  - change amlodpine to nifedipine 60mg oral daily.  - will monitor, optimize and d/c    # COPD  - C/w nebs, inhalers, solumedrol   - 40 mg prednisone tapered to 20mg 9/17, will taper to 10mg 9/18, dc today 9/19    # Hypokalemia  - resolved, s/p repletion  - will monitor    # CAD s/p CABG  - C/w ASA and statin  - No chest pain or EKG changes  - Trop unchanged 0.15.     Activity: AAT  GI: protonix  DVT ppx: Eliquis  Diet: DASH TLC CC  Dispo: home  Full Code

## 2019-09-20 NOTE — DIETITIAN INITIAL EVALUATION ADULT. - PHYSICAL APPEARANCE
BMI: 43.7/overweight overweight/BMI: 43.7, alert, calm, cooperative, skin intact, last bowel movement noted 09/14. Reports diarrhea/constipation at this time. no swallowing/chewing, nausea, vomiting issue at this time.

## 2019-09-20 NOTE — PROGRESS NOTE ADULT - ASSESSMENT
Patient is a 59y old Male who presented with SOB and bilateral LE edema worse than usual. Pt has a hx of HTN, CAD s/p CABG x3, COPD, jameson's esophagus, reactive airway disease, DM2. Had an ankle injury in March and has been ambulating less since then, but is still ambulating with cane.   CTA showed bilateral PE and LL duplex showed DVTs.    Throughout MICU stay, pt remained stable, in no distress. Was never SOB, O2 stat always normal. Has LE edema, being treated with Lasix 40mg BID. Pt switched from Heparin to Eliquis, IV insulin to SubQ, IV steroid to oral steroid. Did not receive any oral steroid dose yet in MICU.     # Acute submassive b/l pulmonary embolism, LLE DVT  - Eliquis 10mg BID q12h for 7 days (till 9/20)  - Repeat trop unchanged at 0.15, BNP 5000  - no radiologic evidence of rt heart strain on CT  - Echo normal  - need long term a/c .  - will d/c pt with out patient f/u as pt is stable and asymptomatic    # B/l LE Edema  - most likeley secondary to venous insufficiency  - pt started on lasix 40mg IV q12    # DM2  - Insulin basal-bolus, FS  - Was not compliant on home diabetes meds due to price. Needs new regimen for discharge.   - d/c pt on insulin lispro, glargine.    # Uncontrolled  HTN  - today better.   - Continue with hydralazine, clonidine, nifedipine, lisinopril, metoprolol  - increased dose of hydralazine to 100 mg PO q8  - changed  amlodpine to nifedipine 60mg oral daily.  - will monitor, optimize and d/c    # COPD  - C/w nebs, inhalers, solumedrol   - 40 mg prednisone tapered to 20mg 9/17, will taper to 10mg 9/18, dc today 9/19    # Hypokalemia  - resolved, s/p repletion  - will monitor    # CAD s/p CABG  - C/w ASA and statin  - No chest pain or EKG changes  - Trop unchanged 0.15.     Activity: AAT  GI: protonix  DVT ppx: Eliquis  Diet: DASH TLC CC  Dispo: home  Full Code    #Progress Note Handoff  Pending (specify):    Family discussion: n/a , d/w the patient.   Disposition: Home today.

## 2019-09-20 NOTE — DIETITIAN INITIAL EVALUATION ADULT. - DIET TYPE
Pt. on dash diet/ TLC. Pt. on dash diet/ TLC + Consistent Carbohydrate (evening snack). Pt with good appetite, consuming % of meals this admit.

## 2019-09-20 NOTE — PROGRESS NOTE ADULT - SUBJECTIVE AND OBJECTIVE BOX
MATHEW MAJANO  59y  Male      Patient is a 59y old  Male who presents with a chief complaint of pulmonary embolism (20 Sep 2019 07:03)      INTERVAL HPI/OVERNIGHT EVENTS:      ******************************* REVIEW OF SYSTEMS:**********************************************  resting in bed.   All other review of systems negative    *********************** VITALS ******************************************    T(F): 99.5 (09-20-19 @ 05:00)  HR: 77 (09-20-19 @ 06:58) (77 - 115)  BP: 128/60 (09-20-19 @ 06:58) (128/60 - 186/77)  RR: 18 (09-20-19 @ 05:00) (18 - 20)  SpO2: --            ******************************** PHYSICAL EXAM:**************************************************  GENERAL: NAD    PSYCH: no agitation, baseline mentation  HEENT:     NERVOUS SYSTEM:  Alert & Oriented X3,   PULMONARY: TABBY, CTA    CARDIOVASCULAR: S1S2 RRR    GI: Soft, NT, ND; BS present.    EXTREMITIES:  2+ Peripheral Pulses, No clubbing, cyanosis, or edema    LYMPH: No lymphadenopathy noted    SKIN: No rashes or lesions    ******************************************************************************************    **************************** LABS *******************************************************                          13.8   10.70 )-----------( 256      ( 20 Sep 2019 06:09 )             43.3     09-20    137  |  97<L>  |  25<H>  ----------------------------<  81  3.9   |  28  |  1.1    Ca    8.9      20 Sep 2019 06:09    TPro  5.5<L>  /  Alb  3.2<L>  /  TBili  1.2  /  DBili  x   /  AST  19  /  ALT  29  /  AlkPhos  49  09-20          Lactate Trend        CAPILLARY BLOOD GLUCOSE      POCT Blood Glucose.: 96 mg/dL (20 Sep 2019 11:31)          **************************Active Medications *******************************************  Mushrooms (Other (Severe))  No Known Drug Allergies      ALBUTerol/ipratropium for Nebulization 3 milliLiter(s) Nebulizer every 4 hours  ALBUTerol/ipratropium for Nebulization 3 milliLiter(s) Nebulizer every 4 hours PRN  aspirin enteric coated 81 milliGRAM(s) Oral daily  buDESOnide 160 MICROgram(s)/formoterol 4.5 MICROgram(s) Inhaler 2 Puff(s) Inhalation two times a day  chlorhexidine 4% Liquid 1 Application(s) Topical <User Schedule>  cloNIDine 0.2 milliGRAM(s) Oral three times a day  dextrose 40% Gel 15 Gram(s) Oral once PRN  dextrose 5%. 1000 milliLiter(s) IV Continuous <Continuous>  dextrose 50% Injectable 12.5 Gram(s) IV Push once  dextrose 50% Injectable 25 Gram(s) IV Push once  dextrose 50% Injectable 25 Gram(s) IV Push once  furosemide   Injectable 40 milliGRAM(s) IV Push every 12 hours  glucagon  Injectable 1 milliGRAM(s) IntraMuscular once PRN  hydrALAZINE 100 milliGRAM(s) Oral every 8 hours  insulin glargine Injectable (LANTUS) 30 Unit(s) SubCutaneous every morning  insulin glargine Injectable (LANTUS) 30 Unit(s) SubCutaneous at bedtime  insulin lispro (HumaLOG) corrective regimen sliding scale   SubCutaneous three times a day before meals  insulin lispro Injectable (HumaLOG) 10 Unit(s) SubCutaneous three times a day before meals  lisinopril 40 milliGRAM(s) Oral daily  metoprolol tartrate 25 milliGRAM(s) Oral two times a day  NIFEdipine XL 60 milliGRAM(s) Oral daily  oxyCODONE    5 mG/acetaminophen 325 mG 1 Tablet(s) Oral every 6 hours PRN  rivaroxaban 15 milliGRAM(s) Oral two times a day with meals      ***************************************************  RADIOLOGY & ADDITIONAL TESTS:    Imaging Personally Reviewed:  [ ] YES  [ ] NO    HEALTH ISSUES - PROBLEM Dx:  Thrombophlebitis of the femoral vein  Pulmonary thromboembolism

## 2019-09-20 NOTE — DIETITIAN INITIAL EVALUATION ADULT. - CONTINUE CURRENT NUTRITION CARE PLAN
Nutrition Education content yes/Nutrition Intervention: Meals & Snacks, Nutrition Education. Monitoring: Glucose profile, body composition, energy intake, food & nutrition knowledge.

## 2019-09-20 NOTE — DIETITIAN INITIAL EVALUATION ADULT. - PERTINENT LABORATORY DATA
09/19: hemoglobin: 13.8 (L), chloride: 97 (L), blood urea nitrogen (25) (H). 09/19: hemoglobin: 13.8 (L), chloride: 97 (L), blood urea nitrogen (25) (H); gluc WDL today; 09/14: HgbA1C 12.5%

## 2019-09-20 NOTE — PROGRESS NOTE ADULT - ASSESSMENT
Patient is a 59y old Male who presented with SOB and bilateral LE edema worse than usual. Pt has a hx of HTN, CAD s/p CABG x3, COPD, jameson's esophagus, reactive airway disease, DM2. Had an ankle injury in March and has been ambulating less since then, but is still ambulating with cane.   CTA showed bilateral PE and LL duplex showed DVTs.    Throughout MICU stay, pt remained stable, in no distress. Was never SOB, O2 stat always normal. Has LE edema, being treated with Lasix 40mg BID. Pt switched from Heparin to Eliquis, IV insulin to SubQ, IV steroid to oral steroid. Did not receive any oral steroid dose yet in MICU.     # Acute submassive b/l pulmonary embolism, LLE DVT  - Eliquis 10mg BID q12h for 7 days (till 9/20)  - Repeat trop unchanged at 0.15, BNP 5000  - no radiologic evidence of rt heart strain on CT  - Echo normal  - sent d/c medication to pharmacy for pricing, as Eliquis is expensive, might d/c on Xarelto   - will d/c pt with out patient f/u as pt is stable and asymptomatic    # B/l LE Edema  - most likeley secondary to venous insufficiency  - pt started on lasix 40mg IV q12    # DM2  - Insulin basal-bolus, FS  - Was not compliant on home diabetes meds due to price. Needs new regimen for discharge.   - d/c pt on insulin lispro, glargine.    # HTN  - uncontrolled HTN  - Continue with hydralazine, clonidine, nifedipine lisinopril, metoprolol  - increased dose of hydralazine to 100 mg PO q8  - change amlodipine to nifedipine 60mg oral daily.  - will monitor, optimize and d/c    # COPD  - C/w nebs, inhalers, solumedrol   - 40 mg prednisone tapered to 20mg 9/17, will taper to 10mg 9/18, dc today 9/19    # Hypokalemia  - resolved, s/p repletion  - will monitor    # CAD s/p CABG  - C/w ASA and statin  - No chest pain or EKG changes  - Trop unchanged 0.15.     Activity: AAT  GI: protonix  DVT ppx: Eliquis  Diet: DASH TLC CC  Dispo: home  Full Code Patient is a 59y old Male who presented with SOB and bilateral LE edema worse than usual. Pt has a hx of HTN, CAD s/p CABG x3, COPD, jameson's esophagus, reactive airway disease, DM2. Had an ankle injury in March and has been ambulating less since then, but is still ambulating with cane.   CTA showed bilateral PE and LL duplex showed DVTs.    Throughout MICU stay, pt remained stable, in no distress. Was never SOB, O2 stat always normal. Has LE edema, being treated with Lasix 40mg BID. Pt switched from Heparin to Eliquis, IV insulin to SubQ, IV steroid to oral steroid. Did not receive any oral steroid dose yet in MICU.     # Acute submassive b/l pulmonary embolism, LLE DVT  - Eliquis 10mg BID q12h for 7 days (till 9/20)  - started on Xarelto 15mg q12 x 21 days (till 10/11)   - Repeat trop unchanged at 0.15, BNP 5000  - no radiologic evidence of rt heart strain on CT  - Echo normal  - sent d/c medication to pharmacy for pricing, as Eliquis is expensive, might d/c on Xarelto   - will d/c pt with out patient f/u as pt is stable and asymptomatic  - pt c/o experiencing difficulty with breathing, O2 sat 97%, ordered CXR, EKG, ABG  - ABG showed respiratory alkalosis, pt put on O2 via non breather mask.    # B/l LE Edema  - most likely secondary to venous insufficiency  - pt started on Lasix 40mg IV q12    # DM2  - Insulin basal-bolus, FS  - Was not compliant on home diabetes meds due to price. Needs new regimen for discharge.   - d/c pt on insulin lispro, glargine.    # HTN  - uncontrolled HTN  - Continue with hydralazine, clonidine, nifedipine lisinopril, metoprolol  - increased dose of hydralazine to 100 mg PO q8  - change amlodipine to nifedipine 60mg oral daily.  - will monitor, optimize and d/c    # COPD  - C/w nebs, inhalers, solumedrol   - 40 mg prednisone tapered to 20mg 9/17, will taper to 10mg 9/18, dc today 9/19    # Hypokalemia  - resolved, s/p repletion  - will monitor    # CAD s/p CABG  - C/w ASA and statin  - No chest pain or EKG changes  - Trop unchanged 0.15.     Activity: AAT  GI: protonix  DVT ppx: Eliquis  Diet: DASH TLC CC  Dispo: home  Full Code Patient is a 59y old Male who presented with SOB and bilateral LE edema worse than usual. Pt has a hx of HTN, CAD s/p CABG x3, COPD, jameson's esophagus, reactive airway disease, DM2. Had an ankle injury in March and has been ambulating less since then, but is still ambulating with cane.   CTA showed bilateral PE and LL duplex showed DVTs.    Throughout MICU stay, pt remained stable, in no distress. Was never SOB, O2 stat always normal. Has LE edema, being treated with Lasix 40mg BID. Pt switched from Heparin to Eliquis, IV insulin to SubQ, IV steroid to oral steroid. Did not receive any oral steroid dose yet in MICU.     # Acute submassive b/l pulmonary embolism, LLE DVT  - Eliquis 10mg BID q12h for 7 days (till 9/20)  - started on Xarelto 15mg q12 x 21 days (till 10/11)   - Repeat trop unchanged at 0.15, BNP 5000  - no radiologic evidence of rt heart strain on CT  - Echo normal  - sent d/c medication to pharmacy for pricing, as Eliquis is expensive, might d/c on Xarelto   - will d/c pt with out patient f/u as pt is stable and asymptomatic  - pt c/o experiencing difficulty with breathing, O2 sat 97%, ordered CXR, EKG, ABG  - ABG showed respiratory alkalosis, pt put on O2 via non breather mask.  - 40mg Solumedrol IV injection and 0.25mg xanax given.    # B/l LE Edema  - most likely secondary to venous insufficiency  - pt started on Lasix 40mg IV q12    # DM2  - Insulin basal-bolus, FS  - Was not compliant on home diabetes meds due to price. Needs new regimen for discharge.   - d/c pt on insulin lispro, glargine.    # HTN  - uncontrolled HTN  - Continue with hydralazine, clonidine, nifedipine lisinopril, metoprolol  - increased dose of hydralazine to 100 mg PO q8  - change amlodipine to nifedipine 60mg oral daily.  - will monitor, optimize and d/c    # COPD  - C/w nebs, inhalers, solumedrol   - 40 mg prednisone tapered to 20mg 9/17, will taper to 10mg 9/18, dc today 9/19    # Hypokalemia  - resolved, s/p repletion  - will monitor    # CAD s/p CABG  - C/w ASA and statin  - No chest pain or EKG changes  - Trop unchanged 0.15.     Activity: AAT  GI: protonix  DVT ppx: Eliquis  Diet: DASH TLC CC  Dispo: home  Full Code

## 2019-09-20 NOTE — DIETITIAN INITIAL EVALUATION ADULT. - ENERGY NEEDS
25-30 kcal/ kg  Energy Needs: 3550 - 4260 kcal/day  Protein needs: 114- 142 g/day (0.8-1 g/kg)  fluid needs: 3550- 4260 ml/ day (1ml/kcal) Energy: 0950-9060 kcal/day (25-30 kcal/kg IBW)    Protein needs: 63-78 g/day (0.8-1 g/kg IBW)    Fluids: 1 mL/kcal

## 2019-09-20 NOTE — DIETITIAN INITIAL EVALUATION ADULT. - NUTRITIONGOAL OUTCOME1
To maintain normal glucose values Pt demonstrates understanding of basic DM nutrition therapy concepts; names 3 sources of CHO x4 days

## 2019-09-21 NOTE — H&P ADULT - ASSESSMENT
60 yo male PMHx HTN, CAD s/p CABG x3 2015, COPD, jameson's esophagus, reactive airway disease, DM2 presented for evaluation of leg swelling and pain and shortness of breath. Throughout MICU stay, pt remained stable, in no distress. Was never SOB, O2 stat always normal. Has LE edema, being treated with Lasix 40mg BID. Pt switched from Heparin to Eliquis, IV insulin to SubQ, IV steroid to oral steroid. Did not receive any oral steroid dose yet in MICU.     Hx of Recent Acute submassive BL pulmonary embolism, Hx of Left LE DVT  - Past admission: Acute pulmonary emboli extending from the distal main PAs into the segmental PAs of all lobes  - Past admission: DVT in the left Popliteal and Gastrocnemius vein  - Was on Eliquis, then switched to Xarelto 15mg q12 x 21 days (till 10/11)   - Echo WNL during past admission    Hx of BL Lower Extremity edema  - Likely secondary to venous insufficiency    Hx of Type II Diabetes Mellitus  - Continue Basal/Bolus insulin patient was discharged on    Hx of CAD s/p CABG; Hx of HTN  - uncontrolled HTN  - Continue with hydralazine, clonidine, nifedipine lisinopril, metoprolol  - increased dose of hydralazine to 100 mg PO q8  - change amlodipine to nifedipine 60mg oral daily.  - C/w ASA and statin  - will monitor, optimize and d/c    Hx of COPD  - C/w nebs, inhalers, solumedrol   - 40 mg prednisone tapered to 20mg 9/17, will taper to 10mg 9/18, dc today 9/19  - pt c/o experiencing difficulty with breathing, O2 sat 97%, ordered CXR, EKG, ABG 9/20  - ABG showed respiratory alkalosis, pt put on O2 via non breather mask.  - 40mg Solumedrol IV injection once0.25mg xanax given.   - pt stable now, has no SOB, wll d/c with ut patient f/u    Electrolyte Imbalances: WNL    DVT ppx:  [X] Not indicated; On anticoagulation    Fluids:   [X] PO    Activity:  [X] Ambulate as tolerated    BMI:  Height (cm): 180.3 (09-14)  Weight (kg): 149 (09-14)  BMI (kg/m2): 45.8 (09-14)    DISPO:  Patient to be discharged when condition(s) optimized.    CODE STATUS  [X] FULL This is a 59 year old male who was recently admitted with PE/DVT and discharged on Xarelto, now readmitted shortly after discharge with a cc/o generalized weakness.     Generalized weakness      Hx of Recent Acute submassive BL pulmonary embolism, Hx of Left LE DVT  - Past admission: Acute pulmonary emboli extending from the distal main PAs into the segmental PAs of all lobes  - Past admission: DVT in the left Popliteal and Gastrocnemius vein  - Was on Eliquis, then switched to Xarelto 15mg q12 x 21 days (till 10/11)   - Echo WNL during past admission    Hx of BL Lower Extremity edema  - Likely secondary to venous insufficiency  - Continue Lasix BID    Hx of Type II Diabetes Mellitus  - Continue Basal/Bolus insulin  - Goal 100 to <180  - Carb consistent diet    Hx of CAD s/p CABG; Hx of MI; Hx of HTN  - Uncontrolled during last admission  - Continue hydralazine, clonidine, nifedipine lisinopril, metoprolol  - Continue ASA and statin  - Patient was discharged on Bystolic and Metoprolol; Will resume only Metoprolol for now  - DASH diet    Hx of COPD with Asthma  - Continue home Solumedrol and Albuterol PRN  - Stable    Hx of Sjorgrens; Hx of SLE    Hx of RUBEN on CPAP    Electrolyte Imbalances: WNL    DVT ppx:  [X] Not indicated; On anticoagulation    Fluids:   [X] PO    Activity:  [X] Ambulate as tolerated    BMI:  Height (cm): 180.3 (09-14)  Weight (kg): 149 (09-14)  BMI (kg/m2): 45.8 (09-14)    DISPO:  Patient to be discharged when condition(s) optimized.    CODE STATUS  [X] FULL This is a 59 year old male who was recently admitted with PE/DVT and discharged on Xarelto, now readmitted shortly after discharge with a cc/o generalized weakness.     Generalized weakness  - Could be due to mild deconditioning, hypoglycemia, and/or response to flu shot  - Symptoms have since resolved; no complaint of weakness at this time  - Will consult PT for reassessment    Worsened BL LE edema; Hx of Venous Stasis  - Was discharged and to continue PO Lasix 40mg BID  - Continue Lasix IV BID for now  - Strict I+O  - Low salt and sodium advised to patient and family at bedside; Pepsi at bedside    Hx of Recent Acute submassive BL pulmonary embolism, Hx of Left LE DVT  - Past admission: Acute pulmonary emboli extending from the distal main PAs into the segmental PAs of all lobes  - Past admission: DVT in the left Popliteal and Gastrocnemius vein  - Was on Eliquis, then switched to Xarelto 15mg q12 x 21 days (till 10/11)   - Echo WNL during past admission    Hx of Type II Diabetes Mellitus  - Continue Basal/Bolus insulin  - Goal 100 to <180  - Carb consistent diet    Hx of CAD s/p CABG; Hx of MI; Hx of HTN  - Uncontrolled during last admission  - Continue hydralazine, clonidine, nifedipine lisinopril, metoprolol  - Continue ASA and statin  - Patient was discharged on Bystolic and Metoprolol; Will resume only Metoprolol for now  - DASH diet    Hx of COPD with Asthma  - Continue home Solumedrol and Albuterol PRN  - Stable    Hx of Sjorgrens; Hx of SLE    Hx of RUBEN on CPAP    Electrolyte Imbalances: WNL    DVT ppx:  [X] Not indicated; On anticoagulation    Fluids:   [X] PO    Activity:  [X] Ambulate as tolerated    BMI:  Height (cm): 180.3 (09-14)  Weight (kg): 149 (09-14)  BMI (kg/m2): 45.8 (09-14)    DISPO:  Patient to be discharged when condition(s) optimized.    CODE STATUS  [X] FULL This is a 59 year old male who was recently admitted with PE/DVT and discharged on Xarelto, now readmitted shortly after discharge with a cc/o generalized weakness.     Generalized weakness  - Could be due to mild deconditioning, hypoglycemia, and/or response to flu shot  - Symptoms have since resolved; no complaint of weakness at this time  - Will consult PT for reassessment    Worsened BL LE edema; Hx of Venous Stasis  - Was discharged and to continue PO Lasix 40mg BID  - Continue Lasix IV BID for now  - Strict I+O  - Low salt and sodium advised to patient and family at bedside; Pepsi at bedside  - Advised leg elevation    Hx of Recent Acute submassive BL pulmonary embolism, Hx of Left LE DVT  - Past admission: Acute pulmonary emboli extending from the distal main PAs into the segmental PAs of all lobes  - Past admission: DVT in the left Popliteal and Gastrocnemius vein  - Was on Eliquis, then switched to Xarelto 15mg q12 x 21 days (till 10/11)   - Echo WNL during past admission    Hx of Type II Diabetes Mellitus  - Continue Basal/Bolus insulin  - Goal 100 to <180  - Carb consistent diet    Hx of CAD s/p CABG; Hx of MI; Hx of HTN  - Uncontrolled during last admission  - Continue hydralazine, clonidine, nifedipine lisinopril, metoprolol  - Continue ASA and statin  - Patient was discharged on Bystolic and Metoprolol; Will resume only Metoprolol for now  - DASH diet    Hx of COPD with Asthma  - Continue home Solumedrol and Albuterol PRN  - Stable    Hx of Sjorgrens; Hx of SLE    Hx of RUBEN on CPAP    Pulmonary and Thyroid Nodule  - Will need OP follow up    Electrolyte Imbalances: Mild hyponatremia; monitor    DVT ppx:  [X] Not indicated; On anticoagulation    Fluids:   [X] PO    Activity:  [X] Ambulate as tolerated    BMI:  Height (cm): 180.3 (09-14)  Weight (kg): 149 (09-14)  BMI (kg/m2): 45.8 (09-14)    DISPO:  Patient to be discharged when condition(s) optimized.    CODE STATUS  [X] FULL This is a 59 year old male who was recently admitted with PE/DVT and discharged on Xarelto, now readmitted shortly after discharge with a cc/o generalized weakness.     Generalized weakness  - Could be due to mild deconditioning, hypoglycemia, and/or response to flu shot  - Symptoms have since resolved; no complaint of weakness at this time  - Will consult PT for reassessment    Worsened BL LE edema; Hx of Venous Stasis  - Was discharged and to continue PO Lasix 40mg BID  - Continue Lasix IV BID for now  - Strict I+O  - Low salt and sodium advised to patient and family; Pepsi at bedside  - Advised leg elevation    Hx of Recent Acute submassive BL pulmonary embolism, Hx of Left LE DVT  - Past admission: Acute pulmonary emboli extending from the distal main PAs into the segmental PAs of all lobes  - Past admission: DVT in the left Popliteal and Gastrocnemius vein  - Was on Eliquis, then switched to Xarelto 15mg q12 x 21 days (till 10/11)   - Echo WNL during past admission    Hx of Type II Diabetes Mellitus  - Continue Basal/Bolus insulin  - Goal 100 to <180  - Carb consistent diet    Hx of CAD s/p CABG; Hx of MI; Hx of HTN  - Uncontrolled during last admission  - Continue hydralazine, clonidine, nifedipine lisinopril, metoprolol  - Continue ASA and statin  - Patient was discharged on Bystolic and Metoprolol; Will resume only Metoprolol for now  - DASH diet    Hx of COPD with Asthma  - Continue home Solumedrol and Albuterol PRN  - Follows as outpatient inconsistently  - Stable    Hx of Sjorgrens; Hx of SLE    Hx of RUBEN on CPAP    Thyroid Nodule    Electrolyte Imbalances: Mild hyponatremia; monitor    DVT ppx:  [X] Not indicated; On anticoagulation    Fluids:   [X] PO    Activity:  [X] Ambulate as tolerated    BMI:  Height (cm): 180.3 (09-14)  Weight (kg): 149 (09-14)  BMI (kg/m2): 45.8 (09-14)    DISPO:  Patient to be discharged when condition(s) optimized.    CODE STATUS  [X] FULL This is a 59 year old male who was recently admitted with PE/DVT and discharged on Xarelto, now readmitted shortly after discharge with a cc/o generalized weakness.     Generalized weakness  - Could be due to mild deconditioning, hypoglycemia, and/or response to flu shot  - Symptoms have since resolved; no complaint of weakness at this time  - Will consult PT for reassessment    Worsened BL LE edema; Hx of Venous Stasis  - Was discharged and to continue PO Lasix 40mg BID  - Continue Lasix IV BID for now  - Strict I+O  - Low salt and sodium advised to patient and family; Pepsi at bedside  - Advised leg elevation    Hx of Recent Acute submassive BL pulmonary embolism, Hx of Left LE DVT  - Past admission: Acute pulmonary emboli extending from the distal main PAs into the segmental PAs of all lobes  - Past admission: DVT in the left Popliteal and Gastrocnemius vein  - Was on Eliquis, then switched to Xarelto 15mg q12 x 21 days (till 10/11)   - Echo WNL during past admission    Hx of Type II Diabetes Mellitus  - Continue Basal/Bolus insulin  - Goal 100 to <180  - Carb consistent diet    Hx of CAD s/p CABG; Hx of MI; Hx of HTN  - Uncontrolled during last admission  - Continue hydralazine, clonidine, nifedipine lisinopril, metoprolol  - Continue ASA and statin  - Patient was discharged on Bystolic and Metoprolol; Will resume only Metoprolol for now  - DASH diet    Hx of COPD with Asthma  - Continue home Solumedrol and Albuterol PRN  - Follows as outpatient inconsistently  - Stable    Hx of Sjorgrens; Hx of SLE    Hx of RUBEN on CPAP    Thyroid Nodule    Electrolyte Imbalances: Mild hyponatremia; monitor      DISPO:  Patient to be discharged when condition(s) optimized.    CODE STATUS  [X] FULL

## 2019-09-21 NOTE — PROGRESS NOTE ADULT - PROVIDER SPECIALTY LIST ADULT
Critical Care
Critical Care
Hospitalist
Internal Medicine
MICU
MICU
Physiatry
Hospitalist

## 2019-09-21 NOTE — ED PROVIDER NOTE - NS ED ROS FT
CONSTITUTIONAL: (-) fevers, (-) chills, (-) decreased appetite, (-) diaphoresis  EYES: (-) vision changes, (-) blurry vision, (-) double vision  CARDIO: (-) chest pain, (-) palpitations  PULM: (-) cough, (-) sputum, (-) shortness of breath  GI: (-) nausea, (-) vomiting, (-) abdominal pain  : (-) incontinence, (-) flank pain  MSK: (-) back pain, (-) gait difficulty  SKIN: (-) rashes, (-) wounds, (-) pallor, (-) ecchymosis  NEURO: (-) headache, (-) head injury, (-) LOC, (-) dizziness, (-) lightheadedness, (-) syncope, (-) speech changes, (-) confusion, (-) numbness, (+) weakness, (-) paresthesias, (-) seizures    *all other systems negative except as documented above and in the HPI*

## 2019-09-21 NOTE — H&P ADULT - NSHPLABSRESULTS_GEN_ALL_CORE
:  LAB RESULTS:                        13.4   9.97  )-----------( 240      ( 21 Sep 2019 07:34 )             41.5     134<L>  |  96<L>  |  28<H>  ---------------------------------<  203<H>  4.2   |  26  |  1.0    Ca    8.4<L>       TPro  5.6<L>  /  Alb  3.0<L>  /  TBili  1.3<H>  /  DBili  x   /  AST  15  /  ALT  24  /  AlkPhos  48  09-21    PT/INR - ( 21 Sep 2019 07:34 )   PT: 16.10 sec;   INR: 1.40 ratio    PTT - ( 21 Sep 2019 07:34 )  PTT:35.8 sec    ABG - ( 20 Sep 2019 12:06 )  pH, Arterial: 7.54  pH, Blood: x     /  pCO2: 32    /  pO2: 106   / HCO3: 27    / Base Excess: 5.5   /  SaO2: 97        CARDIAC MARKERS ( 20 Sep 2019 18:21 )  x     / 0.05 ng/mL / x     / x     / x      CARDIAC MARKERS ( 20 Sep 2019 13:30 )  x     / 0.06 ng/mL / x     / x     / 1.2 ng/mL    MICROBIOLOGY: NTR    RADIOLOGY: NTR    ALLERGIES:  Mushrooms (Other (Severe))  No Known Drug Allergies    ===========================================================

## 2019-09-21 NOTE — PROGRESS NOTE ADULT - SUBJECTIVE AND OBJECTIVE BOX
SUBJECTIVE:    Patient is a 59y old Male who presents with a chief complaint of pulmonary embolism (21 Sep 2019 07:47)    Currently admitted to medicine with the primary diagnosis of Chronic pulmonary embolism with acute cor pulmonale, unspecified pulmonary embolism type    Today is hospital day 8d. This morning he is resting comfortably in bed and reports no new issues or overnight events. Pt denies any SOB, difficulty breathing    PAST MEDICAL & SURGICAL HISTORY  SLE (systemic lupus erythematosus)  Thyroid nodule  Sjogrens syndrome  Obesity  Hiatal hernia with GERD  Murmur  COPD (chronic obstructive pulmonary disease): post 911  Asthma: stable post 911  Obstructive sleep apnea on CPAP  Diabetes  HTN (hypertension)  Myocardial infarction: 2015  CAD (coronary artery disease) of bypass graft: 2015 x 3 vd  Arthritis: ra hands knees  History of surgery: 2015 x 3 v  History of surgery: 1990- rct sx    SOCIAL HISTORY:  Negative for smoking/alcohol/drug use.     ALLERGIES:  Mushrooms (Other (Severe))  No Known Drug Allergies    MEDICATIONS:  STANDING MEDICATIONS  ALBUTerol/ipratropium for Nebulization 3 milliLiter(s) Nebulizer every 4 hours  aspirin enteric coated 81 milliGRAM(s) Oral daily  buDESOnide 160 MICROgram(s)/formoterol 4.5 MICROgram(s) Inhaler 2 Puff(s) Inhalation two times a day  chlorhexidine 4% Liquid 1 Application(s) Topical <User Schedule>  cloNIDine 0.2 milliGRAM(s) Oral three times a day  dextrose 5%. 1000 milliLiter(s) IV Continuous <Continuous>  dextrose 50% Injectable 12.5 Gram(s) IV Push once  dextrose 50% Injectable 25 Gram(s) IV Push once  dextrose 50% Injectable 25 Gram(s) IV Push once  furosemide    Tablet 40 milliGRAM(s) Oral two times a day  hydrALAZINE 100 milliGRAM(s) Oral every 8 hours  insulin glargine Injectable (LANTUS) 30 Unit(s) SubCutaneous every morning  insulin glargine Injectable (LANTUS) 30 Unit(s) SubCutaneous at bedtime  insulin lispro (HumaLOG) corrective regimen sliding scale   SubCutaneous three times a day before meals  insulin lispro Injectable (HumaLOG) 10 Unit(s) SubCutaneous three times a day before meals  lisinopril 40 milliGRAM(s) Oral daily  methylPREDNISolone sodium succinate Injectable 40 milliGRAM(s) IV Push once  metoprolol tartrate 25 milliGRAM(s) Oral two times a day  NIFEdipine XL 60 milliGRAM(s) Oral daily  rivaroxaban 15 milliGRAM(s) Oral two times a day with meals    PRN MEDICATIONS  ALBUTerol/ipratropium for Nebulization 3 milliLiter(s) Nebulizer every 4 hours PRN  dextrose 40% Gel 15 Gram(s) Oral once PRN  glucagon  Injectable 1 milliGRAM(s) IntraMuscular once PRN    VITALS:   T(F): 97.8  HR: 77  BP: 144/75  RR: 18  SpO2: 98%    LABS:                        13.4   9.97  )-----------( 240      ( 21 Sep 2019 07:34 )             41.5     09-21    134<L>  |  96<L>  |  28<H>  ----------------------------<  203<H>  4.2   |  26  |  1.0    Ca    8.4<L>      21 Sep 2019 07:34    TPro  5.6<L>  /  Alb  3.0<L>  /  TBili  1.3<H>  /  DBili  x   /  AST  15  /  ALT  24  /  AlkPhos  48  09-21    PT/INR - ( 21 Sep 2019 07:34 )   PT: 16.10 sec;   INR: 1.40 ratio         PTT - ( 21 Sep 2019 07:34 )  PTT:35.8 sec    ABG - ( 20 Sep 2019 12:06 )  pH, Arterial: 7.54  pH, Blood: x     /  pCO2: 32    /  pO2: 106   / HCO3: 27    / Base Excess: 5.5   /  SaO2: 97                Troponin T, Serum: 0.05 ng/mL <HH> (09-20-19 @ 18:21)  Troponin T, Serum: 0.06 ng/mL <HH> (09-20-19 @ 13:30)      CARDIAC MARKERS ( 20 Sep 2019 18:21 )  x     / 0.05 ng/mL / x     / x     / x      CARDIAC MARKERS ( 20 Sep 2019 13:30 )  x     / 0.06 ng/mL / x     / x     / 1.2 ng/mL      RADIOLOGY:    PHYSICAL EXAM:  e-Tag< from: Xray Chest 1 View-PORTABLE IMMEDIATE (09.20.19 @ 14:02) >  Impression:      No radiographic evidence of acute cardiopulmonary disease.    < end of copied text >  N: No acute distress  LUNGS: Clear to auscultation bilaterally   HEART: S1/S2 present. RRR.   ABD: Soft, non-tender, non-distended. Bowel sounds present  EXT: NC/NC/NE/2+PP/TRACY  NEURO: AAOX3 SUBJECTIVE:    Patient is a 59y old Male who presents with a chief complaint of pulmonary embolism (21 Sep 2019 07:47)    Currently admitted to medicine with the primary diagnosis of Chronic pulmonary embolism with acute cor pulmonale, unspecified pulmonary embolism type    Today is hospital day 8d. This morning he is resting comfortably in bed and reports no new issues or overnight events. Pt denies any SOB or difficulty breathing.     PAST MEDICAL & SURGICAL HISTORY  SLE (systemic lupus erythematosus)  Thyroid nodule  Sjogrens syndrome  Obesity  Hiatal hernia with GERD  Murmur  COPD (chronic obstructive pulmonary disease): post 911  Asthma: stable post 911  Obstructive sleep apnea on CPAP  Diabetes  HTN (hypertension)  Myocardial infarction: 2015  CAD (coronary artery disease) of bypass graft: 2015 x 3 vd  Arthritis: ra hands knees  History of surgery: 2015 x 3 v  History of surgery: 1990- rct sx    SOCIAL HISTORY:  Negative for smoking/alcohol/drug use.     ALLERGIES:  Mushrooms (Other (Severe))  No Known Drug Allergies    MEDICATIONS:  STANDING MEDICATIONS  ALBUTerol/ipratropium for Nebulization 3 milliLiter(s) Nebulizer every 4 hours  aspirin enteric coated 81 milliGRAM(s) Oral daily  buDESOnide 160 MICROgram(s)/formoterol 4.5 MICROgram(s) Inhaler 2 Puff(s) Inhalation two times a day  chlorhexidine 4% Liquid 1 Application(s) Topical <User Schedule>  cloNIDine 0.2 milliGRAM(s) Oral three times a day  dextrose 5%. 1000 milliLiter(s) IV Continuous <Continuous>  dextrose 50% Injectable 12.5 Gram(s) IV Push once  dextrose 50% Injectable 25 Gram(s) IV Push once  dextrose 50% Injectable 25 Gram(s) IV Push once  furosemide    Tablet 40 milliGRAM(s) Oral two times a day  hydrALAZINE 100 milliGRAM(s) Oral every 8 hours  insulin glargine Injectable (LANTUS) 30 Unit(s) SubCutaneous every morning  insulin glargine Injectable (LANTUS) 30 Unit(s) SubCutaneous at bedtime  insulin lispro (HumaLOG) corrective regimen sliding scale   SubCutaneous three times a day before meals  insulin lispro Injectable (HumaLOG) 10 Unit(s) SubCutaneous three times a day before meals  lisinopril 40 milliGRAM(s) Oral daily  methylPREDNISolone sodium succinate Injectable 40 milliGRAM(s) IV Push once  metoprolol tartrate 25 milliGRAM(s) Oral two times a day  NIFEdipine XL 60 milliGRAM(s) Oral daily  rivaroxaban 15 milliGRAM(s) Oral two times a day with meals    PRN MEDICATIONS  ALBUTerol/ipratropium for Nebulization 3 milliLiter(s) Nebulizer every 4 hours PRN  dextrose 40% Gel 15 Gram(s) Oral once PRN  glucagon  Injectable 1 milliGRAM(s) IntraMuscular once PRN    VITALS:   T(F): 97.8  HR: 77  BP: 144/75  RR: 18  SpO2: 98%    LABS:                        13.4   9.97  )-----------( 240      ( 21 Sep 2019 07:34 )             41.5     09-21    134<L>  |  96<L>  |  28<H>  ----------------------------<  203<H>  4.2   |  26  |  1.0    Ca    8.4<L>      21 Sep 2019 07:34    TPro  5.6<L>  /  Alb  3.0<L>  /  TBili  1.3<H>  /  DBili  x   /  AST  15  /  ALT  24  /  AlkPhos  48  09-21    PT/INR - ( 21 Sep 2019 07:34 )   PT: 16.10 sec;   INR: 1.40 ratio         PTT - ( 21 Sep 2019 07:34 )  PTT:35.8 sec    ABG - ( 20 Sep 2019 12:06 )  pH, Arterial: 7.54  pH, Blood: x     /  pCO2: 32    /  pO2: 106   / HCO3: 27    / Base Excess: 5.5   /  SaO2: 97                Troponin T, Serum: 0.05 ng/mL <HH> (09-20-19 @ 18:21)  Troponin T, Serum: 0.06 ng/mL <HH> (09-20-19 @ 13:30)      CARDIAC MARKERS ( 20 Sep 2019 18:21 )  x     / 0.05 ng/mL / x     / x     / x      CARDIAC MARKERS ( 20 Sep 2019 13:30 )  x     / 0.06 ng/mL / x     / x     / 1.2 ng/mL      RADIOLOGY:    PHYSICAL EXAM:  [email or web address]< from: Xray Chest 1 View-PORTABLE IMMEDIATE (09.20.19 @ 14:02) >  Impression:      No radiographic evidence of acute cardiopulmonary disease.    < end of copied text >  N: No acute distress  LUNGS: Clear to auscultation bilaterally   HEART: S1/S2 present. RRR.   ABD: Soft, non-tender, non-distended. Bowel sounds present  EXT: NC/NC/NE/2+PP/TRACY  NEURO: AAOX3

## 2019-09-21 NOTE — H&P ADULT - NSICDXPASTMEDICALHX_GEN_ALL_CORE_FT
PAST MEDICAL HISTORY:  Arthritis ra hands knees    Asthma stable post 911    CAD (coronary artery disease) of bypass graft 2015 x 3 vd    COPD (chronic obstructive pulmonary disease) post 911    Diabetes     DVT, lower extremity 9/2019    Hiatal hernia with GERD     HTN (hypertension)     Murmur     Myocardial infarction 2015    Obesity     Obstructive sleep apnea on CPAP     Pulmonary embolus 9/2019    Pulmonary nodule     Sjogrens syndrome     SLE (systemic lupus erythematosus)     Thyroid nodule     Type 2 diabetes mellitus     Venous insufficiency

## 2019-09-21 NOTE — PROGRESS NOTE ADULT - ASSESSMENT
Patient is a 59y old Male who presented with SOB and bilateral LE edema worse than usual. Pt has a hx of HTN, CAD s/p CABG x3, COPD, jameson's esophagus, reactive airway disease, DM2. Had an ankle injury in March and has been ambulating less since then, but is still ambulating with cane.   CTA showed bilateral PE and LL duplex showed DVTs.    Throughout MICU stay, pt remained stable, in no distress. Was never SOB, O2 stat always normal. Has LE edema, being treated with Lasix 40mg BID. Pt switched from Heparin to Eliquis, IV insulin to SubQ, IV steroid to oral steroid. Did not receive any oral steroid dose yet in MICU.     # Acute submassive b/l pulmonary embolism, LLE DVT  - Eliquis switched to Xarelto.   - Repeat trop  0.15,  >>0.06     BNP 5000  - no radiologic evidence of rt heart strain on CT  - Echo normal  - need long term a/c .    # B/l LE Edema  - most likeley secondary to venous insufficiency  - switch lasix 40mg IV  > po q12 as he was taking at home.     # DM2  - Insulin basal-bolus, FS  - Was not compliant on home diabetes meds due to price. Needs new regimen for discharge.   - d/c pt on insulin lispro, glargine.    # Uncontrolled  HTN  - much  better controlled. .   - Continue with hydralazine, clonidine, nifedipine, lisinopril, metoprolol  - increased dose of hydralazine to 100 mg PO q8  - changed  amlodpine to nifedipine 60mg oral daily.    # COPD  - C/w nebs, inhalers, solumedrol   - 40 mg prednisone tapered to 20mg 9/17, will taper to 10mg 9/18, dc today 9/19    # Hypokalemia  - resolved, s/p repletion  - will monitor    # CAD s/p CABG  - C/w ASA and statin  - No chest pain or EKG changes  - Trop unchanged 0.15.     Activity: AAT  GI: protonix  DVT ppx: Eliquis  Diet: DASH TLC CC  Dispo: home  Full Code    #Progress Note Handoff  Pending (specify):    Family discussion: n/a , d/w the patient.   Disposition: Home today.

## 2019-09-21 NOTE — DISCHARGE NOTE NURSING/CASE MANAGEMENT/SOCIAL WORK - PATIENT PORTAL LINK FT
You can access the FollowMyHealth Patient Portal offered by [facility] by registering at the following website: [email or web address]. By joining [employer]’s FollowMyHealth portal, you will also be able to view your health information using other applications (apps) compatible with our system.

## 2019-09-21 NOTE — H&P ADULT - ATTENDING COMMENTS
The patient was seen and examined at bedside.  Noted the event after discharge yesterday.  More like anxiety component to the episodic weakness.   Now stable.    Please do d/c planning.  If refuses, anticipate.

## 2019-09-21 NOTE — H&P ADULT - HISTORY OF PRESENT ILLNESS
60 yo male PMHx HTN, CAD s/p CABG x3 2015, COPD, jameson's esophagus, reactive airway disease, DM2 presented for evaluation of leg swelling and pain and shortness of breath. He reports that he's had chronic leg swelling which got worse a week ago, and yesterday was prescribed PO steroid taper by his Dr for SOB. He had an ankle fracture since March and was in a cast walked using crutches, with decreased ambulation and prolonged rercumbant durations. He denies chest pain, cough, palpitation, abdominal pain, n/v/d.    In ED EKG LL [facility] showed DVTs (pending official read), lab work showed positive troponin 0.15 and elevated BNP 5000. CT abdomen/pelvis showed b/l acute segmental and subsegmental PE and right main PA pulmonary emboli. O2 97% RA This is a 59 year old male who was recently admitted with PE/DVT and discharged on Xarelto, now readmitted shortly after discharge with a cc/o generalized weakness.     Of note, the patient on past admission presented with dyspnea and increased LE swelling, and was found to have DVT/PE, which may have been provoked by limited mobility due to ankle fracture and casting. He was admitted to the MICU at that time on anticoagulation and was subsequently downgraded as he remained stable; He was also treated with Lasix for LE edema 2/2 venous stasis and course was complicated by difficult to control hypertension, with negative workup for renal artery stenosis as the primary cause. This is a 59 year old male who was recently admitted with PE/DVT and discharged on Xarelto, now readmitted shortly after discharge with a cc/o generalized weakness. The patient was outside in the sun waiting for his family to pick him up for discharge when he started to feel weak, and was brought back into the ED. Upon arrival, he started to feel better quickly, particularly with something to eat. He notes he was recently given the flu shot prior to discharge. ROS was also positive for cough that he reports he had before discharge as well as increased LE swelling. ROS was otherwise negative.     Of note, the patient on past admission presented with dyspnea and increased LE swelling, and was found to have DVT/PE, which may have been provoked by limited mobility due to ankle fracture and casting. He was admitted to the MICU at that time on anticoagulation and was subsequently downgraded as he remained stable; He was also treated with Lasix for LE edema 2/2 venous stasis and course was complicated by difficult to control hypertension, with negative workup for renal artery stenosis as the primary cause.

## 2019-09-21 NOTE — ED PROVIDER NOTE - ATTENDING CONTRIBUTION TO CARE
58 yo M with pmh of dm, pvd, htn, cad, hld, discharged from inpt 20 min prior to registration to ED, presents with c/o generalized weakness.  pt says he was on the wheelchair waiting for his ride outsdie in the sun when he became very warm and felt very weak so he asked the  to take him back to the ER.  pt denies fever or chills.  no cp, sob.  admits that his legs have gotten more swollen than when he was initially admitted and he was supposed to go home and take lasix, was given his daytime dose before dc.  exam: nad, ncat, perrl, eomi, mmm, rrr, ctab, abd soft, nt,nd, obese aox3, b/l leg swelling, rubor of R (known dvt) and b/l pitting edema.  imp; pt with weakness, was recently dc, unable to walk, will readmit to medicine for further management

## 2019-09-21 NOTE — PROGRESS NOTE ADULT - REASON FOR ADMISSION
pulmonary embolism

## 2019-09-21 NOTE — H&P ADULT - NSHPPHYSICALEXAM_GEN_ALL_CORE
:  VITAL SIGNS: Last 24 Hours  T(C): 36.1 (21 Sep 2019 19:50), Max: 36.7 (21 Sep 2019 17:40)  T(F): 96.9 (21 Sep 2019 19:50), Max: 98 (21 Sep 2019 17:40)  HR: 87 (21 Sep 2019 19:50) (77 - 87)  BP: 145/67 (21 Sep 2019 19:50) (107/60 - 145/67)  BP(mean): --  RR: 20 (21 Sep 2019 19:50) (18 - 20)  SpO2: 99% (21 Sep 2019 17:40) (97% - 99%)    PHYSICAL EXAM:  GENERAL:   Awake, alert; NAD.  HEENT:  Head NC/AT; Conjunctivae pink, Sclera anicteric; Oral mucosa moist.  CARDIO:   Regular rate; Regular rhythm; S1 & S2.  RESP:   No rales or rhonchi appreciated.  GI:   Soft; NT/ND; BS; No guarding; No rebound tenderness.  EXT:   No edema in UE and LE.  NEURO:   PERRL.  SKIN:   Intact. :  VITAL SIGNS: Last 24 Hours  T(C): 36.1 (21 Sep 2019 19:50), Max: 36.7 (21 Sep 2019 17:40)  T(F): 96.9 (21 Sep 2019 19:50), Max: 98 (21 Sep 2019 17:40)  HR: 87 (21 Sep 2019 19:50) (77 - 87)  BP: 145/67 (21 Sep 2019 19:50) (107/60 - 145/67)  BP(mean): --  RR: 20 (21 Sep 2019 19:50) (18 - 20)  SpO2: 99% (21 Sep 2019 17:40) (97% - 99%)    PHYSICAL EXAM:  GENERAL:   Awake, alert; NAD.  HEENT:  Head NC/AT; Conjunctivae pink, Sclera anicteric; Oral mucosa moist.  CARDIO:   Regular rate; Regular rhythm; S1 & S2; Murmur.  RESP:   Mild expiratory wheezes and decreased sounds at bases.   GI:   Soft; NT/ND; BS; No guarding; No rebound tenderness; obese abdomen.  EXT:   2-3+ pitting edema in the BL LE.    SKIN:   Intact; Mild area of blanching erythema in the R-shin.

## 2019-09-21 NOTE — PROGRESS NOTE ADULT - SUBJECTIVE AND OBJECTIVE BOX
MATHEW MAJANO  59y  Male      Patient is a 59y old  Male who presents with a chief complaint of pulmonary embolism (21 Sep 2019 07:47)      INTERVAL HPI/OVERNIGHT EVENTS:      ******************************* REVIEW OF SYSTEMS:**********************************************    resting in bed. Comfortable.   All other review of systems negative    *********************** VITALS ******************************************    T(F): 97.8 (09-21-19 @ 05:00)  HR: 77 (09-21-19 @ 05:00) (77 - 96)  BP: 144/75 (09-21-19 @ 05:00) (135/69 - 144/75)  RR: 18 (09-21-19 @ 05:00) (18 - 20)  SpO2: 98% (09-21-19 @ 08:01) (98% - 98%)    09-21-19 @ 07:01  -  09-21-19 @ 13:07  --------------------------------------------------------  IN: 0 mL / OUT: 700 mL / NET: -700 mL            09-21-19 @ 07:01  -  09-21-19 @ 13:07  --------------------------------------------------------  IN: 0 mL / OUT: 700 mL / NET: -700 mL        ******************************** PHYSICAL EXAM:**************************************************  GENERAL: NAD    PSYCH: no agitation, baseline mentation  HEENT:     NERVOUS SYSTEM:  Alert & Oriented X3, MS  5/5 B/L  UE and LE ; Sensory intact    PULMONARY: TABBY, CTA    CARDIOVASCULAR: S1S2 RRR    GI: Soft, NT, ND; BS present.    EXTREMITIES:  2+ Peripheral Pulses, No clubbing, cyanosis, or edema    LYMPH: No lymphadenopathy noted    SKIN: No rashes or lesions    ******************************************************************************************      **************************** LABS *******************************************************                          13.4   9.97  )-----------( 240      ( 21 Sep 2019 07:34 )             41.5     09-21    134<L>  |  96<L>  |  28<H>  ----------------------------<  203<H>  4.2   |  26  |  1.0    Ca    8.4<L>      21 Sep 2019 07:34    TPro  5.6<L>  /  Alb  3.0<L>  /  TBili  1.3<H>  /  DBili  x   /  AST  15  /  ALT  24  /  AlkPhos  48  09-21    ABG - ( 20 Sep 2019 12:06 )  pH, Arterial: 7.54  pH, Blood: x     /  pCO2: 32    /  pO2: 106   / HCO3: 27    / Base Excess: 5.5   /  SaO2: 97                  PT/INR - ( 21 Sep 2019 07:34 )   PT: 16.10 sec;   INR: 1.40 ratio         PTT - ( 21 Sep 2019 07:34 )  PTT:35.8 sec  Lactate Trend    CARDIAC MARKERS ( 20 Sep 2019 18:21 )  x     / 0.05 ng/mL / x     / x     / x      CARDIAC MARKERS ( 20 Sep 2019 13:30 )  x     / 0.06 ng/mL / x     / x     / 1.2 ng/mL      CAPILLARY BLOOD GLUCOSE      POCT Blood Glucose.: 181 mg/dL (21 Sep 2019 12:15)          **************************Active Medications *******************************************  Mushrooms (Other (Severe))  No Known Drug Allergies      ALBUTerol/ipratropium for Nebulization 3 milliLiter(s) Nebulizer every 4 hours  ALBUTerol/ipratropium for Nebulization 3 milliLiter(s) Nebulizer every 4 hours PRN  aspirin enteric coated 81 milliGRAM(s) Oral daily  buDESOnide 160 MICROgram(s)/formoterol 4.5 MICROgram(s) Inhaler 2 Puff(s) Inhalation two times a day  chlorhexidine 4% Liquid 1 Application(s) Topical <User Schedule>  cloNIDine 0.2 milliGRAM(s) Oral three times a day  dextrose 40% Gel 15 Gram(s) Oral once PRN  dextrose 5%. 1000 milliLiter(s) IV Continuous <Continuous>  dextrose 50% Injectable 12.5 Gram(s) IV Push once  dextrose 50% Injectable 25 Gram(s) IV Push once  dextrose 50% Injectable 25 Gram(s) IV Push once  furosemide    Tablet 40 milliGRAM(s) Oral two times a day  glucagon  Injectable 1 milliGRAM(s) IntraMuscular once PRN  hydrALAZINE 100 milliGRAM(s) Oral every 8 hours  insulin glargine Injectable (LANTUS) 30 Unit(s) SubCutaneous every morning  insulin glargine Injectable (LANTUS) 30 Unit(s) SubCutaneous at bedtime  insulin lispro (HumaLOG) corrective regimen sliding scale   SubCutaneous three times a day before meals  insulin lispro Injectable (HumaLOG) 10 Unit(s) SubCutaneous three times a day before meals  lisinopril 40 milliGRAM(s) Oral daily  methylPREDNISolone sodium succinate Injectable 40 milliGRAM(s) IV Push once  metoprolol tartrate 25 milliGRAM(s) Oral two times a day  NIFEdipine XL 60 milliGRAM(s) Oral daily  rivaroxaban 15 milliGRAM(s) Oral two times a day with meals      ***************************************************  RADIOLOGY & ADDITIONAL TESTS:    Imaging Personally Reviewed:  [ ] YES  [ ] NO    HEALTH ISSUES - PROBLEM Dx:  Thrombophlebitis of the femoral vein  Pulmonary thromboembolism

## 2019-09-21 NOTE — ED PROVIDER NOTE - OBJECTIVE STATEMENT
59 year old male w hx of NIDDM2, PVD, HTN, CAD, DLD presents to the ED for evaluation of constant, mild, improving generalized weakness x 20 minutes. Patient states he was just d/c 20 minutes prior to arrival after being admitted for PE last week. States he was sitting in the sun outside on a bench waiting for his daughter to pick him up when he began to feel hot and weak. States he feels improved since being brought inside. Denies headaches, vision changes, dizziness, chest pain, shortness of breath, abd pain, n/v, syncope, LOC, confusion, numbness, weakness, paresthesias, incontinence.

## 2019-09-21 NOTE — ED ADULT NURSE NOTE - INTERVENTIONS DEFINITIONS
Physically safe environment: no spills, clutter or unnecessary equipment/[place] to call system/Instruct patient to call for assistance/Stretcher in lowest position, wheels locked, appropriate side rails in place/Monitor gait and stability/Reinforce activity limits and safety measures with patient and family/Call bell, personal items and telephone within reach

## 2019-09-21 NOTE — ED PROVIDER NOTE - PHYSICAL EXAMINATION
VITALS:  I have reviewed the initial vital signs.  GENERAL: Well-developed, overweight male, in no acute distress. Nontoxic.  HEENT: Sclera clear. No conjunctival injection. EOMI, PERRLA. Mucous membranes moist.  NECK: supple w FROM.   CARDIO: RRR, nl S1 and S2. No murmurs, rubs, or gallops. 2+ radial pulses bilaterally.  PULM: Normal effort. CTA b/l without wheezes, rales, or rhonchi.  MSK: FROM to extremities x4. No joint swelling, erythema, or ttp.  GI: Abdomen soft and non-distended. Nontender in all four quadrants without rebound or guarding. No pulsatile masses.  : No CVA tenderness b/l.  SKIN: Warm, dry. No pallor or rashes. Capillary refill <2 seconds.  NEURO: A&Ox3. Speech clear. CN II-XII intact. 5/5 strength to upper and lower extremities b/l. Sensation intact and equal throughout. No pronator drift. Finger to nose intact. Normal heel to shin.

## 2019-09-21 NOTE — PROGRESS NOTE ADULT - ASSESSMENT
Patient is a 59y old Male who presented with SOB and bilateral LE edema worse than usual. Pt has a hx of HTN, CAD s/p CABG x3, COPD, jameson's esophagus, reactive airway disease, DM2. Had an ankle injury in March and has been ambulating less since then, but is still ambulating with cane.   CTA showed bilateral PE and LL duplex showed DVTs.    Throughout MICU stay, pt remained stable, in no distress. Was never SOB, O2 stat always normal. Has LE edema, being treated with Lasix 40mg BID. Pt switched from Heparin to Eliquis, IV insulin to SubQ, IV steroid to oral steroid. Did not receive any oral steroid dose yet in MICU.     # Acute submassive b/l pulmonary embolism, LLE DVT  - Eliquis 10mg BID q12h for 7 days (till 9/20)  - started on Xarelto 15mg q12 x 21 days (till 10/11)   - Repeat trop unchanged at 0.15, BNP 5000  - no radiologic evidence of rt heart strain on CT  - Echo normal  - sent d/c medication to pharmacy for pricing, as Eliquis is expensive, might d/c on Xarelto   - will d/c pt with out patient f/u as pt is stable and asymptomatic  - pt c/o experiencing difficulty with breathing, O2 sat 97%, ordered CXR, EKG, ABG  - ABG showed respiratory alkalosis, pt put on O2 via non breather mask.  - 40mg Solumedrol IV injection and 0.25mg xanax given.    # B/l LE Edema  - most likely secondary to venous insufficiency  - pt started on Lasix 40mg IV q12    # DM2  - Insulin basal-bolus, FS  - Was not compliant on home diabetes meds due to price. Needs new regimen for discharge.   - d/c pt on insulin lispro, glargine.    # HTN  - uncontrolled HTN  - Continue with hydralazine, clonidine, nifedipine lisinopril, metoprolol  - increased dose of hydralazine to 100 mg PO q8  - change amlodipine to nifedipine 60mg oral daily.  - will monitor, optimize and d/c    # COPD  - C/w nebs, inhalers, solumedrol   - 40 mg prednisone tapered to 20mg 9/17, will taper to 10mg 9/18, dc today 9/19    # Hypokalemia  - resolved, s/p repletion  - will monitor    # CAD s/p CABG  - C/w ASA and statin  - No chest pain or EKG changes  - Trop unchanged 0.15.     Activity: AAT  GI: protonix  DVT ppx: Eliquis  Diet: DASH TLC CC  Dispo: home  Full Code Patient is a 59y old Male who presented with SOB and bilateral LE edema worse than usual. Pt has a hx of HTN, CAD s/p CABG x3, COPD, jameson's esophagus, reactive airway disease, DM2. Had an ankle injury in March and has been ambulating less since then, but is still ambulating with cane.   CTA showed bilateral PE and LL duplex showed DVTs.    Throughout MICU stay, pt remained stable, in no distress. Was never SOB, O2 stat always normal. Has LE edema, being treated with Lasix 40mg BID. Pt switched from Heparin to Eliquis, IV insulin to SubQ, IV steroid to oral steroid. Did not receive any oral steroid dose yet in MICU.     # Acute submassive b/l pulmonary embolism, LLE DVT  - Eliquis 10mg BID q12h for 7 days (till 9/20)  - started on Xarelto 15mg q12 x 21 days (till 10/11)   - Repeat trop unchanged at 0.15, BNP 5000  - no radiologic evidence of rt heart strain on CT  - Echo normal  - sent d/c medication to pharmacy for pricing, as Eliquis is expensive, might d/c on Xarelto   - will d/c pt with out patient f/u as pt is stable and asymptomatic      # B/l LE Edema  - most likely secondary to venous insufficiency  - pt started on Lasix 40mg IV q12    # DM2  - Insulin basal-bolus, FS  - Was not compliant on home diabetes meds due to price. Needs new regimen for discharge.   - d/c pt on insulin lispro, glargine.    # HTN  - uncontrolled HTN  - Continue with hydralazine, clonidine, nifedipine lisinopril, metoprolol  - increased dose of hydralazine to 100 mg PO q8  - change amlodipine to nifedipine 60mg oral daily.  - will monitor, optimize and d/c    # COPD  - C/w nebs, inhalers, solumedrol   - 40 mg prednisone tapered to 20mg 9/17, will taper to 10mg 9/18, dc today 9/19  - pt c/o experiencing difficulty with breathing, O2 sat 97%, ordered CXR, EKG, ABG 9/20  - ABG showed respiratory alkalosis, pt put on O2 via non breather mask.  - 40mg Solumedrol IV injection once0.25mg xanax given.   - pt stable now, has no SOB, wll d/c with ut patient f/u    # Hypokalemia  - resolved, s/p repletion  - will monitor    # CAD s/p CABG  - C/w ASA and statin  - No chest pain or EKG changes  - Trop unchanged 0.15.     Activity: AAT  GI: protonix  DVT ppx: Eliquis  Diet: DASH TLC CC  Dispo: home  Full Code

## 2019-09-21 NOTE — ED PROVIDER NOTE - CLINICAL SUMMARY MEDICAL DECISION MAKING FREE TEXT BOX
Pt with generalized weakness, reregistered in ED 20 min after dc from inpt admission.  pt had recent acute on chronic PE, says his legs more swollen than initial admission and he is unable to walk.  will readmit to medicine

## 2019-09-23 PROBLEM — G47.30 SLEEP APNEA: Status: ACTIVE | Noted: 2019-01-01

## 2019-09-23 NOTE — DISCHARGE NOTE PROVIDER - HOSPITAL COURSE
This is a 59 year old male who was recently admitted with PE/DVT and discharged on Xarelto, now readmitted day of discharge with a c/o generalized weakness, in setting of sitting on bench in hot sun while waiting for his ride. Felt better with fluids and glucose, is hemodynamically stable and medically ready for discharge. No meds were adjusted, counseling on diet and exercise provided to patient.

## 2019-09-23 NOTE — DISCHARGE NOTE PROVIDER - PROVIDER TOKENS
PROVIDER:[TOKEN:[[id number]],FOLLOWUP:[1 week]],PROVIDER:[TOKEN:[[id number]],FOLLOWUP:[1 week]]

## 2019-09-23 NOTE — DISCHARGE NOTE PROVIDER - CARE PROVIDERS DIRECT ADDRESSES
[email or web address]

## 2019-09-23 NOTE — PROGRESS NOTE ADULT - ATTENDING COMMENTS
Pt was seen and examined at bedside independently, pt is asking about discharge, he denies any specific complaints, family is at the bedside.   Pt was discharged from the hospital on Saturday, was left in the sun outside of the hospital  and did not feel well while he was awaiting for his family member to pick him up he started to feel weak and was brought back to ER , readmitted in 2 hours after discharge.   Case d/w hospitalist who took care of the pt on last admission, all meds were sent to [facility], everything was arranged for safe discharge.   Case d/w his sister and brother in law at length, the reason pt came back was a poor family support.   This time sister is willing to held him, case was discussed with family and pt at length,  no need for IV Lasix ( pt has normal 2Decho).  He is morbidly obese, diet, meds compliance and lifestyle modifications d/w family. Pt is stable for discharge home today.

## 2019-09-23 NOTE — DISCHARGE NOTE PROVIDER - CARE PROVIDER_API CALL
Isis Robertson)  Family Medicine  [address] [place] [id number]  Phone: [phone]  Fax: [phone]  Follow Up Time: 1 week    Kleiner, Morton J (MD)  Internal Medicine; Nephrology  [address] [place] [address]  Phone: [phone]  Fax: [phone]  Follow Up Time: 1 week

## 2019-09-23 NOTE — DISCHARGE NOTE NURSING/CASE MANAGEMENT/SOCIAL WORK - PATIENT PORTAL LINK FT
You can access the FollowMyHealth Patient Portal offered by [facility] by registering at the following website: [email or web address]. By joining [facility]’s FollowMyHealth portal, you will also be able to view your health information using other applications (apps) compatible with our system.

## 2019-09-23 NOTE — DISCHARGE NOTE PROVIDER - NSDCCPCAREPLAN_GEN_ALL_CORE_FT
PRINCIPAL DISCHARGE DIAGNOSIS  Diagnosis: Generalized weakness  Assessment and Plan of Treatment: You came in feeling weak after sitting in the hot sun for a long time.   -Please stay in the shade as much as possible and drink plenty of water when outside   -Please Follow up with your primary care provider  -Please take your medications as prescribed

## 2019-09-23 NOTE — CONSULT NOTE ADULT - SUBJECTIVE AND OBJECTIVE BOX
HPI:  This is a 59 year old male who was recently admitted with PE/DVT and discharged on Xarelto, now readmitted shortly after discharge with a cc/o generalized weakness. The patient was outside in the sun waiting for his family to pick him up for discharge when he started to feel weak, and was brought back into the ED. Upon arrival, he started to feel better quickly, particularly with something to eat. He notes he was recently given the flu shot prior to discharge. ROS was also positive for cough that he reports he had before discharge as well as increased LE swelling. ROS was otherwise negative.     Of note, the patient on past admission presented with dyspnea and increased LE swelling, and was found to have DVT/PE, which may have been provoked by limited mobility due to ankle fracture and casting. He was admitted to the MICU at that time on anticoagulation and was subsequently downgraded as he remained stable; He was also treated with Lasix for LE edema 2/2 venous stasis and course was complicated by difficult to control hypertension, with negative workup for renal artery stenosis as the primary cause. (21 Sep 2019 20:09)      PAST MEDICAL & SURGICAL HISTORY:  Pulmonary nodule  Type 2 diabetes mellitus  Venous insufficiency  DVT, lower extremity: 2019  Pulmonary embolus: 2019  SLE (systemic lupus erythematosus)  Thyroid nodule  Sjogrens syndrome  Obesity  Hiatal hernia with GERD  Murmur  COPD (chronic obstructive pulmonary disease): post   Asthma: stable post   Obstructive sleep apnea on CPAP  Diabetes  HTN (hypertension)  Myocardial infarction:   CAD (coronary artery disease) of bypass graft: 2015 x 3 vd  Arthritis: ra hands knees  History of surgery: 2015 x 3 v  History of surgery: - rct sx      Hospital Course:    TODAY'S SUBJECTIVE & REVIEW OF SYMPTOMS:     Constitutional WNL   Cardio WNL   Resp WNL   GI WNL  Heme WNL  Endo WNL  Skin WNL  MSK Weakness  Neuro WNL  Cognitive WNL  Psych WNL      MEDICATIONS  (STANDING):  aspirin enteric coated 81 milliGRAM(s) Oral daily  buDESOnide 160 MICROgram(s)/formoterol 4.5 MICROgram(s) Inhaler 2 Puff(s) Inhalation two times a day  chlorhexidine 4% Liquid 1 Application(s) Topical <User Schedule>  cloNIDine 0.2 milliGRAM(s) Oral every 8 hours  furosemide   Injectable 40 milliGRAM(s) IV Push two times a day  hydrALAZINE 100 milliGRAM(s) Oral every 8 hours  insulin glargine Injectable (LANTUS) 36 Unit(s) SubCutaneous at bedtime  insulin lispro (HumaLOG) corrective regimen sliding scale   SubCutaneous three times a day before meals  insulin lispro Injectable (HumaLOG) 12 Unit(s) SubCutaneous three times a day before meals  lisinopril 40 milliGRAM(s) Oral daily  metoprolol tartrate 25 milliGRAM(s) Oral two times a day  NIFEdipine XL 60 milliGRAM(s) Oral at bedtime  rivaroxaban 15 milliGRAM(s) Oral two times a day with meals    MEDICATIONS  (PRN):  ALBUTerol    90 MICROgram(s) HFA Inhaler 2 Puff(s) Inhalation every 6 hours PRN Shortness of Breath and/or Wheezing  ALBUTerol/ipratropium for Nebulization 3 milliLiter(s) Nebulizer every 4 hours PRN Shortness of Breath and/or Wheezing      FAMILY HISTORY:  Diabetes (Father, Mother)  CAD (coronary artery disease) (Father, Mother)      Allergies    Mushrooms (Other (Severe))  No Known Drug Allergies    Intolerances        SOCIAL HISTORY:    [  ] Etoh  [  ] Smoking  [  ] Substance abuse     Home Environment:  [  ] Home Alone  [x  ] Lives with Family  [  ] Home Health Aid    Dwelling:  [  ] Apartment  [x  ] Private House  [  ] Adult Home  [  ] Skilled Nursing Facility      [  ] Short Term  [  ] Long Term  [x  ] Stairs       Elevator [  ]    FUNCTIONAL STATUS PTA: (Check all that apply)  Ambulation: [x   ]Independent    [  ] Dependent     [  ] Non-Ambulatory  Assistive Device: [x  ] SA Cane  [  ]  Q Cane  [  ] Walker  [  ]  Wheelchair  ADL : [x  ] Independent  [  ]  Dependent       Vital Signs Last 24 Hrs  T(C): 35.6 (23 Sep 2019 05:54), Max: 37.4 (22 Sep 2019 19:51)  T(F): 96 (23 Sep 2019 05:54), Max: 99.3 (22 Sep 2019 19:51)  HR: 72 (23 Sep 2019 05:54) (72 - 95)  BP: 149/67 (23 Sep 2019 05:54) (128/59 - 149/67)  BP(mean): --  RR: 20 (23 Sep 2019 05:54) (18 - 24)  SpO2: 97% (23 Sep 2019 07:59) (97% - 97%)      PHYSICAL EXAM: Alert & Oriented X3  GENERAL: NAD, well-groomed, well-developed  HEAD:  Atraumatic, Normocephalic  CHEST/LUNG: Clear   HEART: S1S2+  ABDOMEN: Soft, Nontender  EXTREMITIES:  mild edema sisi le    NERVOUS SYSTEM:  Cranial Nerves 2-12 intact [  ] Abnormal  [  ]  ROM: WFL all extremities [x  ]  Abnormal [  ]  Motor Strength: WFL all extremities  [x  ]  Abnormal [  ]  Sensation: intact to light touch [ x ] Abnormal [  ]  Reflexes: Symmetric [  ]  Abnormal [  ]    FUNCTIONAL STATUS:  Bed Mobility: Independent [  ]  Supervision [x  ]  Needs Assistance [  ]  N/A [  ]  Transfers: Independent [  ]  Supervision [  ]  Needs Assistance [x  ]  N/A [  ]   Ambulation: Independent [  ]  Supervision [  ]  Needs Assistance [x  ]  N/A [  ]  ADL: Independent [  ] Requires Assistance [  ] N/A [  ]      LABS:                        13.0   9.04  )-----------( 267      ( 22 Sep 2019 04:30 )             40.3     09-    135  |  98  |  31<H>  ----------------------------<  83  3.7   |  24  |  1.1    Ca    8.6      22 Sep 2019 04:30  Phos  3.8     -  Mg     2.2     -    TPro  5.5<L>  /  Alb  3.1<L>  /  TBili  1.3<H>  /  DBili  x   /  AST  18  /  ALT  25  /  AlkPhos  45  09-22    PT/INR - ( 22 Sep 2019 04:30 )   PT: 18.00 sec;   INR: 1.57 ratio         PTT - ( 22 Sep 2019 04:30 )  PTT:35.4 sec  Urinalysis Basic - ( 21 Sep 2019 17:25 )    Color: Yellow / Appearance: Clear / S.013 / pH: x  Gluc: x / Ketone: Negative  / Bili: Negative / Urobili: <2 mg/dL   Blood: x / Protein: Trace / Nitrite: Negative   Leuk Esterase: Negative / RBC: x / WBC x   Sq Epi: x / Non Sq Epi: x / Bacteria: x        RADIOLOGY & ADDITIONAL STUDIES:    Assesment:

## 2019-09-23 NOTE — PROGRESS NOTE ADULT - SUBJECTIVE AND OBJECTIVE BOX
MATHEW MAJANO 59y Male  MRN#: [id number]     SUBJECTIVE  Patient is a 59y old Male who presents with a chief complaint of Generalized weakness (23 Sep 2019 09:49)      Today is hospital day 2d, and this morning he is lying in bed without distress. Reports feeling much better than yesterday, says he walked to the bathroom and did not feel weak or lightheaded. Used BiPAP Overnight. Leg edema seemed improved to him. bowel movement yesterday.   No acute overnight events.     OBJECTIVE  PAST MEDICAL & SURGICAL HISTORY  Pulmonary nodule  Type 2 diabetes mellitus  Venous insufficiency  DVT, lower extremity: 2019  Pulmonary embolus: 2019  SLE (systemic lupus erythematosus)  Thyroid nodule  Sjogrens syndrome  Obesity  Hiatal hernia with GERD  Murmur  COPD (chronic obstructive pulmonary disease): post   Asthma: stable post   Obstructive sleep apnea on CPAP  Diabetes  HTN (hypertension)  Myocardial infarction:   CAD (coronary artery disease) of bypass graft: 2015 x 3 vd  Arthritis: ra hands knees  History of surgery: 2015 x 3 v  History of surgery: - rct sx    ALLERGIES:  Mushrooms (Other (Severe))  No Known Drug Allergies    MEDICATIONS:  STANDING MEDICATIONS  aspirin enteric coated 81 milliGRAM(s) Oral daily  buDESOnide 160 MICROgram(s)/formoterol 4.5 MICROgram(s) Inhaler 2 Puff(s) Inhalation two times a day  chlorhexidine 4% Liquid 1 Application(s) Topical <User Schedule>  cloNIDine 0.2 milliGRAM(s) Oral every 8 hours  furosemide   Injectable 40 milliGRAM(s) IV Push two times a day  hydrALAZINE 100 milliGRAM(s) Oral every 8 hours  insulin glargine Injectable (LANTUS) 36 Unit(s) SubCutaneous at bedtime  insulin lispro (HumaLOG) corrective regimen sliding scale   SubCutaneous three times a day before meals  insulin lispro Injectable (HumaLOG) 12 Unit(s) SubCutaneous three times a day before meals  lisinopril 40 milliGRAM(s) Oral daily  metoprolol tartrate 25 milliGRAM(s) Oral two times a day  NIFEdipine XL 60 milliGRAM(s) Oral at bedtime  rivaroxaban 15 milliGRAM(s) Oral two times a day with meals    PRN MEDICATIONS  ALBUTerol    90 MICROgram(s) HFA Inhaler 2 Puff(s) Inhalation every 6 hours PRN  ALBUTerol/ipratropium for Nebulization 3 milliLiter(s) Nebulizer every 4 hours PRN    HOME MEDICATIONS  Home Medications:  Aspir 81 oral delayed release tablet: 1 tab(s) orally once a day (15 May 2018 10:06)  budesonide-formoterol 160 mcg-4.5 mcg/inh inhalation aerosol: 2 puff(s) inhaled every 4 hours (18 Sep 2019 16:09)  Bystolic 20 mg oral tablet: 1 tab(s) orally once a day (15 May 2018 10:06)  cloNIDine 0.2 mg oral tablet: orally 3 times a day (15 May 2018 10:06)  metoprolol tartrate 25 mg oral tablet: 1 tab(s) orally 2 times a day (15 May 2018 10:06)  oxycodone-acetaminophen 5 mg-325 mg/5 mL oral solution: orally once a day, As Needed (15 May 2018 10:06)      LABS:                        13.0   9.04  )-----------( 267      ( 22 Sep 2019 04:30 )             40.3     -    135  |  98  |  31<H>  ----------------------------<  83  3.7   |  24  |  1.1    Ca    8.6      22 Sep 2019 04:30  Phos  3.8     -  Mg     2.2         TPro  5.5<L>  /  Alb  3.1<L>  /  TBili  1.3<H>  /  DBili  x   /  AST  18  /  ALT  25  /  AlkPhos  45  -22    LIVER FUNCTIONS - ( 22 Sep 2019 04:30 )  Alb: 3.1 g/dL / Pro: 5.5 g/dL / ALK PHOS: 45 U/L / ALT: 25 U/L / AST: 18 U/L / GGT: x           PT/INR - ( 22 Sep 2019 04:30 )   PT: 18.00 sec;   INR: 1.57 ratio         PTT - ( 22 Sep 2019 04:30 )  PTT:35.4 sec  Urinalysis Basic - ( 21 Sep 2019 17:25 )    Color: Yellow / Appearance: Clear / S.013 / pH: x  Gluc: x / Ketone: Negative  / Bili: Negative / Urobili: <2 mg/dL   Blood: x / Protein: Trace / Nitrite: Negative   Leuk Esterase: Negative / RBC: x / WBC x   Sq Epi: x / Non Sq Epi: x / Bacteria: x      CAPILLARY BLOOD GLUCOSE      POCT Blood Glucose.: 300 mg/dL (23 Sep 2019 08:17)      PHYSICAL EXAM:  T(C): 35.6 (19 @ 05:54), Max: 37.4 (19 @ 19:51)  HR: 72 (19 @ 05:54) (72 - 95)  BP: 149/67 (19 @ 05:54) (128/59 - 149/67)  RR: 20 (19 @ 05:54) (18 - 24)  SpO2: 97% (19 @ 07:59) (97% - 97%)    GENERAL: NAD, well-developed, 59y lying in bed  EENT: EOMI, conjunctiva and sclera clear, No nasal obstruction or discharge  RESPIRATORY: Clear to auscultation bilaterally with decreased breath sounds; No wheeze or crackles  CARDIOVASCULAR: Regular rate and rhythm; No murmurs, rubs, or gallops. 2-3+ pitting edema bilateral LE, +serous drainage from linear laceration on anterior LLE, no purulence no erythema or induration  GASTROINTESTINAL: Abdomen obese, Soft, Nontender, Nondistended; Bowel sounds present  MUSCULOSKELETAL:  No cyanosis, extremities grossly symmetrical  NEUROLOGY: non-focal, cognition intact, MAEE    ADMISSION SUMMARY  Patient is a 59y old Male who presents with a chief complaint of Generalized weakness (23 Sep 2019 09:49)

## 2019-09-23 NOTE — CONSULT NOTE ADULT - ASSESSMENT
IMPRESSION: Rehab of gait dysfunction    PRECAUTIONS: [  ] Cardiac  [  ] Respiratory  [  ] Seizures [  ] Contact Isolation  [  ] Droplet Isolation  [  ] Other    Weight Bearing Status:     RECOMMENDATION:    Out of Bed to Chair     DVT/Decubiti Prophylaxis    REHAB PLAN:     [ x  ] Bedside P/T 3-5 times a week   [   ]   Bedside O/T  2-3 times a week             [   ] No Rehab Therapy Indicated                   [   ]  Speech Therapy   Conditioning/ROM                                    ADL  Bed Mobility                                               Conditioning/ROM  Transfers                                                     Bed Mobility  Sitting /Standing Balance                         Transfers                                        Gait Training                                               Sitting/Standing Balance  Stair Training [   ]Applicable                    Home equipment Eval                                                                        Splinting  [   ] Only      GOALS:   ADL   [   ]   Independent                    Transfers  [ x  ] Independent                          Ambulation  [x   ] Independent     [ x   ] With device                            [   ]  CG                                                         [   ]  CG                                                                  [   ] CG                            [    ] Min A                                                   [   ] Min A                                                              [   ] Min  A          DISCHARGE PLAN:   [   ]  Good candidate for Intensive Rehabilitation/Hospital based                                             Will tolerate 3hrs Intensive Rehab Daily                                       [    ]  Short Term Rehab in Skilled Nursing Facility                                       [  x  ]  Home with Outpatient or  services                                         [    ]  Possible Candidate for Intensive Hospital based Rehab

## 2019-12-05 PROBLEM — R60.9 PERIPHERAL EDEMA: Status: ACTIVE | Noted: 2019-01-01

## 2019-12-05 PROBLEM — I87.2 VENOUS INSUFFICIENCY (CHRONIC) (PERIPHERAL): Chronic | Status: ACTIVE | Noted: 2019-01-01

## 2019-12-05 PROBLEM — E11.9 TYPE 2 DIABETES MELLITUS WITHOUT COMPLICATIONS: Chronic | Status: ACTIVE | Noted: 2019-01-01

## 2019-12-05 PROBLEM — R91.1 SOLITARY PULMONARY NODULE: Chronic | Status: ACTIVE | Noted: 2019-01-01

## 2019-12-05 PROBLEM — I82.409 ACUTE EMBOLISM AND THROMBOSIS OF UNSPECIFIED DEEP VEINS OF UNSPECIFIED LOWER EXTREMITY: Chronic | Status: ACTIVE | Noted: 2019-01-01

## 2019-12-05 PROBLEM — I26.99 OTHER PULMONARY EMBOLISM WITHOUT ACUTE COR PULMONALE: Chronic | Status: ACTIVE | Noted: 2019-01-01

## 2019-12-05 NOTE — PHYSICAL EXAM
[General Appearance - Well Developed] : well developed [Normal Appearance] : normal appearance [Well Groomed] : well groomed [General Appearance - Well Nourished] : well nourished [No Deformities] : no deformities [General Appearance - In No Acute Distress] : no acute distress [Normal Conjunctiva] : the conjunctiva exhibited no abnormalities [Eyelids - No Xanthelasma] : the eyelids demonstrated no xanthelasmas [Normal Oral Mucosa] : normal oral mucosa [No Oral Pallor] : no oral pallor [No Oral Cyanosis] : no oral cyanosis [Normal Jugular Venous A Waves Present] : normal jugular venous A waves present [Normal Jugular Venous V Waves Present] : normal jugular venous V waves present [No Jugular Venous Payne A Waves] : no jugular venous payne A waves [Heart Sounds] : normal S1 and S2 [Heart Rate And Rhythm] : heart rate and rhythm were normal [Murmurs] : no murmurs present [Exaggerated Use Of Accessory Muscles For Inspiration] : no accessory muscle use [Respiration, Rhythm And Depth] : normal respiratory rhythm and effort [Abdomen Soft] : soft [Auscultation Breath Sounds / Voice Sounds] : lungs were clear to auscultation bilaterally [Abdomen Tenderness] : non-tender [Abdomen Mass (___ Cm)] : no abdominal mass palpated [Abnormal Walk] : normal gait [Gait - Sufficient For Exercise Testing] : the gait was sufficient for exercise testing [Nail Clubbing] : no clubbing of the fingernails [Cyanosis, Localized] : no localized cyanosis [Petechial Hemorrhages (___cm)] : no petechial hemorrhages [] : no rash [Skin Color & Pigmentation] : normal skin color and pigmentation [Skin Lesions] : no skin lesions [No Venous Stasis] : no venous stasis [No Skin Ulcers] : no skin ulcer [No Xanthoma] : no  xanthoma was observed [Oriented To Time, Place, And Person] : oriented to person, place, and time [Affect] : the affect was normal [Mood] : the mood was normal [No Anxiety] : not feeling anxious
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
## 2019-12-17 NOTE — ED ADULT NURSE NOTE - PMH
Arthritis  ra hands knees  Asthma  stable post 911  CAD (coronary artery disease) of bypass graft  2015 x 3 vd  COPD (chronic obstructive pulmonary disease)  post 911  Diabetes    DVT, lower extremity  9/2019  Hiatal hernia with GERD    HTN (hypertension)    Murmur    Myocardial infarction  2015  Obesity    Obstructive sleep apnea on CPAP    Pulmonary embolus  9/2019  Pulmonary nodule    Sjogrens syndrome    SLE (systemic lupus erythematosus)    Thyroid nodule    Type 2 diabetes mellitus    Venous insufficiency

## 2019-12-17 NOTE — ED PROVIDER NOTE - PHYSICAL EXAMINATION
VITAL SIGNS: cardiac arrest  CONSTITUTIONAL: distress  combi tibe ,  blood from ears,  nose  and combi tube  SKIN: cool  HEAD:  no scalp lac  noted in supine  position or  depressed skull fracture  EYES: dilated nonreactive right 4mm.., left 5mm  ENT: b/l hemotympanum   CARD: compressions  RESP: b/l breath sound with BVM assisted ventilations  ABD:  no ecchymosis  FAST  RUQ - no free fluid  EXT:  no deformity /ecchymosis

## 2019-12-17 NOTE — ED PROVIDER NOTE - OBJECTIVE STATEMENT
59yM pmhx CAD  CABG , MI, PVD, NIDDM,  COPD  OSAm, SLE,  PE/DVT - on Xarelto  - BIBEMS in cardiac arrest -  per ems -  pt last seen 3 hours ago -  family found  patient at  bottom of stairs with head trauma. Per EMS  blood from  b/l ears on scene  -  head hit concrete  wall and  fractured the wall.   initial rhythm and for entire ACLs  was asystole,  Pt was also noted to be cyanotic  from  neck  up on scene.  Combi tube  placed by EMS  2/2  jaw rigidity.  acls  continued  for 45 minutes until patient arrived in ED.  Per family  pt  felt  fine,  but has ahd multipel falls previously.  Does not use walking assitance

## 2019-12-17 NOTE — ED PROVIDER NOTE - CARE PLAN
Principal Discharge DX:	Trauma  Secondary Diagnosis:	Cardiac arrest  Secondary Diagnosis:	CHI (closed head injury)

## 2019-12-17 NOTE — ED PROVIDER NOTE - CLINICAL SUMMARY MEDICAL DECISION MAKING FREE TEXT BOX
pt with copd  cabg   on xarelto for  dvt /pe ,   presented in  arrest  - found on  bottom of stairs  with head trauma  on xarelto  - Pt last spoke with daughter 1 hour  before ems was called - no complaints -   on ems arrival asystole,   acls  started   continue for 45 minutes -  in ed  asystole,     acls continued,   no free fluid on fast -,  b/l  finger thoracostomies  performed b/l  prior to ending code .   after  15 minutes in ED,  45 minutes  by EMS,   code  terminated  at 20:58 by me.  ME made aware  + ME  case   dw  investigator minnie.   family  kept updated throughout code. Nursing staff,  2 Pas and  2nd ED  attending  assisted with code

## 2019-12-26 ENCOUNTER — APPOINTMENT (OUTPATIENT)
Dept: CARDIOLOGY | Facility: CLINIC | Age: 59
End: 2019-12-26

## 2021-11-19 NOTE — ED PROVIDER NOTE - CCCP TRG CHIEF CMPLNT
Patient vital signs are at baseline: Yes  Patient able to ambulate as they were prior to admission or with assist devices provided by therapies during their stay:  No,  Reason: Pt is 20% WB LLE. Flat foot LLE. Pt is able to pivot into chair, worked with PT and difficulty ambulating.   Patient MUST void prior to discharge:  Yes  Patient able to tolerate oral intake:  Yes  Pain has adequate pain control using Oral analgesics:  Yes    Worked with PT, they are recommenced TCU. Pt unable to safely ambulate or cooperate with WB status. Pt is pivot to commode. Did not meet his goal of 75 ft.      weakness

## 2022-01-11 NOTE — ED ADULT NURSE NOTE - NSFALLRSKHARMRISK_ED_ALL_ED
2022    TELEHEALTH EVALUATION -- Audio/Visual (During CVKEA-17 public health emergency)    HPI:    Barbie Moy (:  [facility] [address] 2007    Dr. Steffanie Bryant ([facility])    CLL (chronic lymphocytic leukemia) ([facility] [address]     Complex regional pain syndrome     Right hand; Mindi Messer; postherpetic neuralgia    Depression     Dr Norberto Riggs 2011    Fibromyalgia     Dr Kodi Lu [address] [place] [address] authority and the [employer] no

## 2022-03-13 NOTE — DISCHARGE NOTE NURSING/CASE MANAGEMENT/SOCIAL WORK - NSPROEXTENSIONSOFSELF_GEN_A_NUR
[facility]   Progress Note    Patient Name: Marisa Portillo  : 1960  MRN: [id number]  Primary Care Physician: Judah Johnson MD  Date of admission: 3/9/2022    Subjective   Subjective   HPI: Cellulitis, abscess right breast implant, spacer area,  UTI, improving overall, says she slept well when she got to sleep last night, she not having any pain no fevers says overall she is feeling much better, we discussed potential for discharge versus need for continued IV antibiotics for several more days and she understands,, she is complaining of a rash with severe itching on her back area, thinks it is a heat rash,  Also has noted increased swelling in her hands and feet, and around her face area,      Review of Systems   All systems were reviewed and negative except for: No nausea vomiting no chest pains other than the site where the drain is, says she is sleeping better overall she did get up and move around in the room she is eating better now, she is complaining of a little bit of swelling in her hands and feet,      Objective   Objective     Vitals:  Patient Vitals for the past 24 hrs:   BP Temp Temp src Pulse Resp SpO2   22 0737 118/65 98.8 °F (37.1 °C) Oral 82 20 94 %   22 0458 117/62 98.6 °F (37 °C) Oral 77 18 92 %   22 2333 112/56 98.3 °F (36.8 °C) Oral 79 18 96 %   22 2040 114/57 98.1 °F (36.7 °C) Oral 78 18 97 %   22 1516 115/65 98.8 °F (37.1 °C) Oral 90 18 96 %   22 1110 115/58 98.1 °F (36.7 °C) Oral 90 20 97 %      Physical Exam   Lungs are clear anteriorly and laterally cardiac exam regular rhythm her abdomen soft--- her drain is intact, she is got a little bit of dark yellow bloody type drainage in the bottle, she is moving all 4 extremities to command she has trace edema to her lower legs, slight edema to her hands and feet compared to previous days, rash on the back is raised palpable diffuse red,      Result Review    Result Review:  I have  personally reviewed the results from the time of this admission to 03/13/22 9:56 AM EDT and agree with these findings:  [x]  Laboratory  [x]  Microbiology  []  Radiology  []  EKG/Telemetry   []  Cardiology/Vascular   []  Pathology  []  Old records  []  Other:      Active Hospital Meds:  Scheduled Meds:cefepime, 2 g, Intravenous, Q8H  OLANZapine, 5 mg, Oral, Nightly  pantoprazole, 40 mg, Oral, BID AC  sodium chloride, 10 mL, Intravenous, Q12H  vancomycin, 1,250 mg, Intravenous, Q12H  vitamin D, 50,000 Units, Oral, Q7 Days      Continuous Infusions:lactated ringers, 9 mL/hr, Last Rate: 9 mL/hr (03/11/22 1816)  Pharmacy to Dose Cefepime,   Pharmacy to dose vancomycin,   sodium chloride 0.9 % with KCl 20 mEq, 50 mL/hr, Last Rate: 50 mL/hr (03/13/22 0630)      PRN Meds:.•  acetaminophen  •  aluminum-magnesium hydroxide-simethicone  •  senna-docusate sodium **AND** polyethylene glycol **AND** bisacodyl **AND** bisacodyl  •  calcium carbonate  •  HYDROcodone-acetaminophen  •  HYDROmorphone  •  lactated ringers  •  melatonin  •  ondansetron  •  Pharmacy to Dose Cefepime  •  Pharmacy to dose vancomycin  •  sodium chloride  •  traMADol    Assessment/Plan   Assessment / Plan     Active Hospital Problems:  Active Hospital Problems    Diagnosis    • Sepsis, due to unspecified organism, unspecified whether acute organ dysfunction present (HCC)        Assessment/Plan:     Rash on the back most likely due to moisture heat, will apply Lotrisone, IV Pepcid will be restarted, along with Zyrtec, will consider steroid but avoid if possible March 13    Sepsis, multifactorial to include urinary tract infection and now cellulitis of the right breast and expander implant area, status post surgical debridement removal of pus fluid, drain placed last night March 11, 2022------- patient's improved clinically,   white count is improving my back to normal March 13,, IV vancomycin added day #3 IV cefepime continues day #4 --------------I  discussed the case with hematology oncology Dr. Mendez-- March 11 and with our plastic surgeon who did the surgery Dr. Sandhu, March 11, and evening of March 12, appreciate her help,------ we will continue antibiotics, we will stop IV fluids March 13, continue IV antibiotics, culture results from the wound culture growing gram-positive cocci final ID and sensitivity pending March 13, urine should be finished treatment soon and consider oral antibiotics for this issue    Mild hypokalemia will replace, with oral dosing 10 twice a day March 13,     anemia -----most likely due to rehydration, bone marrow suppression, chemo, sepsis, may need blood transfusion,, will follow March 13, check iron studies today may need IV iron also,    Urinary tract infection,  Klebsiella pneumonia, sensitive to cefepime, continue as above     Invasive ductal carcinoma right breast previous bilateral mastectomies with multifocal areas of invasive ductal carcinoma largest area 2.3 cm with positive lymph nodes 4 out of 10 ER and MD positive HER-2 negative,, currently undergoing chemotherapy and possible radiation therapy as adjuvant treatment,     MAGUI, appendectomy, Port-A-Cath placement,     Impaired fasting glucose in the past, currently slightly elevated will follow, blood sugar seem to be holding March 11, 12th and 13th     Vitamin D deficiency, will replace with 50,000 units weekly dosing, March 12 TIA 2010, MRI MRA underwent a Star closure procedure with clipping     Osteoarthritis     Chronic venous insufficiency changes,, Hep-Lock IV fluids March 13     DVT prophylaxis, with Lovenox,     GI prophylaxis ordered, with Pepcid         CODE STATUS:    Code Status and Medical Interventions:   Ordered at: 03/09/22 1221     Code Status (Patient has no pulse and is not breathing):    CPR (Attempt to Resuscitate)     Medical Interventions (Patient has pulse or is breathing):    Full       Electronically signed by Judah Johnson MD,  03/13/22, 9:56 AM EDT.   cane

## 2024-04-18 NOTE — ED ADULT TRIAGE NOTE - TEMPERATURE IN FAHRENHEIT (DEGREES F)
Thank you for connecting with us today on the [facility] Virtual Health service of [facility]. If you have any questions after your visit, please feel free to contact us at [phone].    What to do in an Emergency:  If you are experiencing a medical emergency, call 911 immediately. Symptoms that require immediate attention require a visit at Urgent Care ([place]), Immediate Care Center ([place]) or the Emergency Room of a nearby hospital.    When to contact a provider:  Seek in-person treatment if there is no improvement of symptoms.     Do not have a primary care provider?   If you do not have a primary care provider or have any questions about your visit, please call the Virtual Health RN at [phone].    Billing Questions:   If you have any billing concerns, please contact our Billing Department at [phone]. Hours: Mon. - Thu. 7:30 am - 6 pm and Friday 7:30 pm to 5 pm.    Thank you for entrusting us with your care.     You can connect by Video with a Quick Care provider 24/7 for common and non-urgent symptoms. You can also get care by completing an E-Visit questionnaire. Complete a questionnaire by choosing from a list of common health concerns*. A Quick Care provider will respond to your questionnaire answers within an 1 hour (24/7). You will receive a treatment plan, including a prescription if you need one, and/or testing for COVID, Influenza, Mono, RSV, Strep and urine, depending on your symptoms.     Cold Symptoms Behavioral   Health Skin Concerns Women's and Men's   Health   Everything Else   Cough*    Anxiety* Acne                                 Birth Control Abdominal Discomfort     COVID treatment* Depression *  Bug Bites   Emergency Contraception Acid Reflux   Nasal congestion* Insomnia Cold Sores Erectile Dysfunction                      Excessive Sweating (underarms)          Red/pink eye  Dandruff Smoking Cessation    Headache or migraine*        Sore throat*  Eczema Urinary  Symptoms* Joint pain or stiffness       Sinus infection*    Minor Skin Injuries Vaginal Itching*  Medication Refills*         Poison Ivy Vaginal Discharge* Nausea, vomiting and diarrhea         Rash   Neck and Back Pain*       Shingles  Seasonal Allergies          Tick bites          97.7

## 2024-05-06 NOTE — PATIENT PROFILE ADULT - LAST BOWEL MOVEMENT DATE
[FreeTextEntry1] : rediscussed labs from 1/17/2024 and 3/12/2024 76 yo man with labile HTN, proteinuria, LE edema secondary etiologies  excluded -Proteinuria-  no hematuria- increase losartan to 100 mg daily monitor BP; detailed discussion, should add sglt2i (and probably dc HCTZ) as next step check serologies. creat stable; defer kidney biopsy however explained that if creat and/or proteinuria increase would benefit from a biopsy HTN- BP okay- should be able to tolerate increased losartan- measure orthostatics ABPM from 10/2023 shows BP above target 145/90 range with excellent dipping work on weight loss- to get  range- consider increase meds if he can tolerate after next OV (if no weight loss)   OV in office q2 months for 2 visits 20-Sep-2019